# Patient Record
Sex: MALE | Race: WHITE | NOT HISPANIC OR LATINO | Employment: FULL TIME | URBAN - METROPOLITAN AREA
[De-identification: names, ages, dates, MRNs, and addresses within clinical notes are randomized per-mention and may not be internally consistent; named-entity substitution may affect disease eponyms.]

---

## 2017-05-19 ENCOUNTER — OFFICE VISIT (OUTPATIENT)
Dept: FAMILY MEDICINE CLINIC | Facility: CLINIC | Age: 22
End: 2017-05-19

## 2017-05-19 VITALS
HEIGHT: 74 IN | TEMPERATURE: 98 F | SYSTOLIC BLOOD PRESSURE: 140 MMHG | WEIGHT: 314.1 LBS | HEART RATE: 82 BPM | DIASTOLIC BLOOD PRESSURE: 90 MMHG | OXYGEN SATURATION: 99 % | BODY MASS INDEX: 40.31 KG/M2 | RESPIRATION RATE: 14 BRPM

## 2017-05-19 DIAGNOSIS — I10 ESSENTIAL HYPERTENSION: Primary | ICD-10-CM

## 2017-05-19 PROBLEM — Z00.00 ROUTINE ADULT HEALTH MAINTENANCE: Status: ACTIVE | Noted: 2017-05-19

## 2017-05-19 PROBLEM — E66.3 SEVERELY OVERWEIGHT: Status: ACTIVE | Noted: 2017-05-19

## 2017-05-19 PROCEDURE — 99204 OFFICE O/P NEW MOD 45 MIN: CPT | Performed by: FAMILY MEDICINE

## 2017-05-19 RX ORDER — LISINOPRIL AND HYDROCHLOROTHIAZIDE 12.5; 1 MG/1; MG/1
1 TABLET ORAL DAILY
Qty: 90 TABLET | Refills: 3 | Status: SHIPPED | OUTPATIENT
Start: 2017-05-19 | End: 2018-07-05 | Stop reason: SDUPTHER

## 2017-06-22 ENCOUNTER — OFFICE VISIT (OUTPATIENT)
Dept: FAMILY MEDICINE CLINIC | Facility: CLINIC | Age: 22
End: 2017-06-22

## 2017-06-22 VITALS
HEART RATE: 92 BPM | SYSTOLIC BLOOD PRESSURE: 136 MMHG | BODY MASS INDEX: 39.14 KG/M2 | HEIGHT: 74 IN | TEMPERATURE: 98.1 F | DIASTOLIC BLOOD PRESSURE: 90 MMHG | WEIGHT: 305 LBS | OXYGEN SATURATION: 98 %

## 2017-06-22 DIAGNOSIS — I10 ESSENTIAL HYPERTENSION: Primary | ICD-10-CM

## 2017-06-22 PROCEDURE — 99213 OFFICE O/P EST LOW 20 MIN: CPT | Performed by: FAMILY MEDICINE

## 2017-06-22 NOTE — PROGRESS NOTES
"  Chief Complaint   Patient presents with   • Follow-up   • Hypertension       Subjective     Patient here for follow-up of elevated blood pressure.    He is exercising and is adherent to a low-salt diet.    Blood pressure is well controlled at home.   Cardiac symptoms: none.   Patient denies: chest pressure/discomfort, claudication, cough, dyspnea, exertional chest pressure/discomfort, fatigue, irregular heart beat and lower extremity edema.   Cardiovascular risk factors: hypertension, male gender and obesity (BMI >= 30 kg/m2).   Use of agents associated with hypertension: none.   History of target organ damage: none.  Patient is taking prescribed hypertension medications as prescribed without side effects.  We added hctz to his lisinopril last ov      The following portions of the patient's history were reviewed and updated as appropriate: allergies, current medications, past family history, past medical history, past social history, past surgical history and problem list.    Review of Systems  Pertinent items are noted in HPI.    No results found for this or any previous visit.     Vitals:    06/22/17 0910 06/22/17 0921   BP: 126/84 136/90   BP Location: Left arm Right arm   Patient Position: Sitting Sitting   Cuff Size:  Large Adult   Pulse: 92    Temp: 98.1 °F (36.7 °C)    SpO2: 98%    Weight: (!) 305 lb (138 kg)    Height: 74\" (188 cm)      Objective    Gen: alert, pleasant.  HEENT: PERRL, EOMI intact, lids ok, ear canals clear, TMs normal, throat clear, nostrils normal  Neck: no bruit, no enlarged thyroid  Lungs: CTA  Heart: RR no murmur  ABD: soft , + BS  Pulses: intact  Mood: stable     Assessment/Plan   Hypertension, normal blood pressure Evidence of target organ damage: none.    Murray was seen today for follow-up and hypertension.    Diagnoses and all orders for this visit:    Essential hypertension      Medication: no change.  Dietary sodium restriction.  Regular aerobic exercise.  Follow up: 6 months " and as needed.    There are no Patient Instructions on file for this visit.  There are no discontinued medications.     No Follow-up on file.    Limit salt  Limit alcoholic drinks to 1 a day  Limit caffeine to 1-2 servings a day    Dr. Son Hwang MD  [place], [place]  [facility].

## 2017-09-22 ENCOUNTER — APPOINTMENT (OUTPATIENT)
Dept: GENERAL RADIOLOGY | Facility: HOSPITAL | Age: 22
End: 2017-09-22

## 2017-09-22 ENCOUNTER — HOSPITAL ENCOUNTER (EMERGENCY)
Facility: HOSPITAL | Age: 22
Discharge: HOME | End: 2017-09-22
Attending: EMERGENCY MEDICINE | Admitting: EMERGENCY MEDICINE

## 2017-09-22 VITALS
SYSTOLIC BLOOD PRESSURE: 153 MMHG | WEIGHT: 315 LBS | OXYGEN SATURATION: 95 % | DIASTOLIC BLOOD PRESSURE: 81 MMHG | TEMPERATURE: 98.3 F | RESPIRATION RATE: 18 BRPM | HEART RATE: 93 BPM | HEIGHT: 75 IN | BODY MASS INDEX: 39.17 KG/M2

## 2017-09-22 DIAGNOSIS — M75.101 TEAR OF RIGHT ROTATOR CUFF, UNSPECIFIED TEAR EXTENT: Primary | ICD-10-CM

## 2017-09-22 PROCEDURE — 73030 X-RAY EXAM OF SHOULDER: CPT

## 2017-09-22 PROCEDURE — 99283 EMERGENCY DEPT VISIT LOW MDM: CPT

## 2017-09-22 PROCEDURE — 99282 EMERGENCY DEPT VISIT SF MDM: CPT | Performed by: EMERGENCY MEDICINE

## 2017-09-22 RX ORDER — DICLOFENAC SODIUM 75 MG/1
75 TABLET, DELAYED RELEASE ORAL 2 TIMES DAILY PRN
Qty: 20 TABLET | Refills: 0 | Status: SHIPPED | OUTPATIENT
Start: 2017-09-22 | End: 2017-10-20 | Stop reason: ALTCHOICE

## 2017-09-22 RX ORDER — METAXALONE 800 MG/1
800 TABLET ORAL 4 TIMES DAILY PRN
Qty: 24 TABLET | Refills: 0 | Status: SHIPPED | OUTPATIENT
Start: 2017-09-22 | End: 2017-10-20

## 2017-09-23 NOTE — ED PROVIDER NOTES
Subjective     History provided by:  Patient    History of Present Illness    · Chief complaint: Right shoulder pain    · Location: Top of the right shoulder    · Quality/Severity: Moderately severe pain on the top of his right shoulder.    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    · Modifying Factors: Movement of the right shoulder exacerbates pain.    · Associated symptoms: He has numbness of the right fourth and fifth fingers.    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  Range of motion wrist right shoulder exacerbates pain.  The patient has a history of prior partial rotator cuff tear on the right which was treated with physical therapy.    ED Triage Vitals   Temp Heart Rate Resp BP SpO2   09/22/17 1645 09/22/17 1645 09/22/17 1645 09/22/17 1645 09/22/17 1645   98.3 °F (36.8 °C) 114 22 154/91 96 %      Temp src Heart Rate Source Patient Position BP Location FiO2 (%)   09/22/17 1645 09/22/17 1645 09/22/17 1912 09/22/17 1645 --   Oral Monitor Lying Left arm        Review of Systems   Constitutional: Negative for activity change, appetite change, chills, diaphoresis, fatigue and fever.   HENT: Negative for congestion, dental problem, ear pain, hearing loss, mouth sores, postnasal drip, rhinorrhea, sinus pressure, sore throat and voice change.    Eyes: Negative for photophobia, pain, discharge, redness and visual disturbance.   Respiratory: Negative for cough, chest tightness, shortness of breath, wheezing and stridor.    Cardiovascular: Negative for chest pain, palpitations and leg swelling.   Gastrointestinal: Negative for abdominal pain, diarrhea, nausea and vomiting.   Genitourinary: Negative for difficulty urinating,  dysuria, flank pain, frequency, hematuria and urgency.   Musculoskeletal: Negative for arthralgias, back pain, gait problem, joint swelling, myalgias, neck pain and neck stiffness.   Skin: Negative for color change and rash.   Neurological: Negative for dizziness, tremors, seizures, syncope, facial asymmetry, speech difficulty, weakness, light-headedness, numbness and headaches.   Hematological: Negative for adenopathy.   Psychiatric/Behavioral: Negative.  Negative for confusion and decreased concentration. The patient is not nervous/anxious.        Past Medical History:   Diagnosis Date   • Hypertension    • Shoulder injury     right       No Known Allergies    Past Surgical History:   Procedure Laterality Date   • TONSILLECTOMY     • TOOTH EXTRACTION  05/2017       Family History   Problem Relation Age of Onset   • Diabetes Mother    • COPD Mother      smoker   • Hypertension Father    • Lung cancer Father    • Alcohol abuse Brother        Social History     Social History   • Marital status: Single     Spouse name: N/A   • Number of children: 0   • Years of education: N/A     Occupational History   •  [employer]     Social History Main Topics   • Smoking status: Never Smoker   • Smokeless tobacco: Never Used   • Alcohol use 1.2 oz/week     2 Cans of beer per week      Comment: social   • Drug use: No   • Sexual activity: Not Currently     Partners: Female     Other Topics Concern   • None     Social History Narrative           Objective   Physical Exam   Constitutional: He is oriented to person, place, and time. He appears well-developed and well-nourished. No distress.   HENT:   Head: Normocephalic and atraumatic.   Musculoskeletal:   Right shoulder is no bony deformities.  Is mild soft tissue tenderness over the deltoid area of the right shoulder.  He has severe pain in the right shoulder, especially the deltoid area with range of motion of the right shoulder.  The right hand is neurovascularly intact.    Neurological: He is alert and oriented to person, place, and time. No cranial nerve deficit.   No focal motor sensory deficit   Skin: Skin is warm and dry. No rash noted. He is not diaphoretic. No erythema.   Psychiatric: He has a normal mood and affect. His behavior is normal. Judgment and thought content normal.   Nursing note and vitals reviewed.      Procedures         ED Course  ED Course   Comment By Time   Tez Report [id number] Benjamin Lee MD 09/22 1845   Right arm placed in shoulder immobilizer by tech.  Right hand neurovascularly intact after placement. Benjamin Lee MD 09/22 1900   The patient's right shoulder x-ray was normal.  Is my impression the patient has a partial tear of his rotator cuff.  He will be referred to follow [facility]. Benjamin Lee MD 09/22 9555                  MDM  Number of Diagnoses or Management Options  Tear of right rotator cuff, unspecified tear extent: new and requires workup     Amount and/or Complexity of Data Reviewed  Tests in the radiology section of CPT®: ordered and reviewed  Independent visualization of images, tracings, or specimens: yes    Risk of Complications, Morbidity, and/or Mortality  Presenting problems: low  Diagnostic procedures: low  Management options: low    Patient Progress  Patient progress: stable      Final diagnoses:   Tear of right rotator cuff, unspecified tear extent           Labs Reviewed - No data to display  XR Shoulder 2+ View Right   ED Interpretation   No fracture or dislocation.  No soft tissue abnormality.      Final Result             Medication List      New Prescriptions          diclofenac 75 MG EC tablet   Commonly known as:  VOLTAREN   Take 1 tablet by mouth 2 (Two) Times a Day As Needed (Pain) for up to 20   doses.       metaxalone 800 MG tablet   Commonly known as:  SKELAXIN   Take 1 tablet by mouth 4 (Four) Times a Day As Needed for Muscle Spasms   for up to 24 doses.                Benjamin Lee MD  10/04/17  [id number]

## 2017-09-27 ENCOUNTER — HOSPITAL ENCOUNTER (OUTPATIENT)
Dept: PHYSICAL THERAPY | Facility: HOSPITAL | Age: 22
Setting detail: THERAPIES SERIES
Discharge: HOME | End: 2017-09-27

## 2017-09-27 DIAGNOSIS — S46.911A SHOULDER STRAIN, RIGHT, INITIAL ENCOUNTER: Primary | ICD-10-CM

## 2017-09-27 NOTE — THERAPY EVALUATION
"    Outpatient Physical Therapy Ortho Initial Evaluation   [facility]     Patient Name: Murray Regalado  : 1995  MRN: [id number]  Today's Date: 2017      Visit Date: 2017    Patient Active Problem List   Diagnosis   • Essential hypertension   • Routine adult health maintenance   • Severely overweight   • Impingement syndrome of right shoulder        Past Medical History:   Diagnosis Date   • Hypertension    • Shoulder injury     right        Past Surgical History:   Procedure Laterality Date   • TONSILLECTOMY     • TOOTH EXTRACTION  2017       Visit Dx:     ICD-10-CM ICD-9-CM   1. 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 Pt had progressinve R shoulder pain since. Pt seen by Torin Laboy and given medication -presently off duty- and given referral to PT. Pt relates he has had a previous R shoulder injury involving R RCT 2 years ago. Pt attended PT x 2mos and had full recovery  -CC      Previous treatment for THIS PROBLEM Medication  -CC      Onset Date- PT 17  -CC      Patient/Caregiver Goals Relieve pain;Return to prior level of function;Return to work;Improve mobility;Improve strength  -CC      Hand Dominance right-handed  -CC      Occupation/sports/leisure activities EMS at [employer]- - remodeling his home  -CC      Patient seeing anyone else for problem(s)? Yes    Torin Laboy/[facility]aron K[facility] PA-C  -CC      How has patient tried to help " current problem? Use of CP - medication- Propping up with pillows  -CC      What clinical tests have you had for this problem? X-ray  -CC      Results of Clinical Tests Negative findings  -CC      History of Previous Related Injuries R RCT 2 years ago due to work related injury- had PT -RTW - full recovery  -CC      Pain     Pain Location Shoulder   Ant/mid and R UT up to neck  -CC      Pain at Present 7  -CC      Pain at Best 2  -CC      Pain Frequency Constant/continuous  -CC      What Performance Factors Make the Current Problem(s) WORSE? Any use of R UE- using L for most all of ADL  -CC      What Performance Factors Make the Current Problem(s) BETTER? Keeping R hand in pocket- cold packs-propping back into corner of couch when sitting  -CC      Is your sleep disturbed? Yes  -CC      What position do you sleep in? --   Propped up on many pillows -R UE supported  -CC      Difficulties at work? 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 Yes- eating -dressing- bathing- lifiting with LUE  -CC      Difficulties with recreational activities? Yes  -CC      Daily Activities    Primary Language English  -CC      How does patient learn best? Listening  -CC      Teaching needs identified Home Exercise Program;Management of Condition  -CC      Patient is concerned about/has problems with Difficulty with self care (i.e. bathing, dressing, toileting:;Grasping objects lifting;Performing home management (household chores, shopping, care of dependents);Performing job responsibilities/community activities (work, school,;Performing sports, recreation, and play activities;Reaching over head;Repetitive movements of the hand, arm, shoulder  -CC      Does patient have problems with the following? None  -CC      Barriers to learning None  -CC      Pt Participated in POC and Goals Yes  -CC      Safety    Are you being hurt, hit, or frightened by anyone at home or in your life? No  -CC      Are you being  neglected by a caregiver No  -CC        User Key  (r) = Recorded By, (t) = Taken By, (c) = Cosigned By    Initials Name Provider Type    CC Gayatri Peters, STEVENSON Physical Therapist                PT Ortho       09/27/17 0900    Precautions and Contraindications    Precautions/Limitations no known precautions/limitations   limited by pain  -CC    Posture/Observations    Forward Head Mild;Moderate  -CC    Cervical Lordosis Mild;Moderate;Increased  -CC    Thoracic Kyphosis Moderate;Increased  -CC    Rounded Shoulders Moderate;Increased  -CC    Scapular Elevation Right:;Mild;Decreased  -CC    Sensory Screen for Light Touch- Upper Quarter Clearing    C4 (posterior shoulder) Right:;Intact  -CC    C5 (lateral upper arm) Right:;Intact  -CC    C6 (tip of thumb) Right:;Intact  -CC    C7 (tip of 3rd finger) Right:;Intact  -CC    C8 (tip of 5th finger) Right:;Diminished   paresthesia  -CC    T1 (medial lower arm) Right:;Intact  -CC    Cervical/Shoulder ROM Screen    Cervical flexion Normal  -CC    Cervical extension Normal  -CC    Cervical lateral flexion Normal  -CC    Cervical rotation Normal  -CC    Cervical quadrant (Spurling's) Normal  -CC    Cervical/Thoracic Special Tests    Cervical Compression (Forarminal Compression vs. Facet Pain) Negative  -CC    Cervical Distraction (Foraminal Compression vs. Facet Pain) Negative  -CC    Shoulder Impingement/Rotator Cuff Special Tests    Litlte-Walker Test (RC Lesion vs. Bursitis) Right:;Positive  -CC    Neer Impingement Test (RC Lesion vs. Bursitis) Right:;Positive  -CC    Full Can Test (RC Lesion) Right:;Unable to test  -CC    Empty Can Test (RC Lesion) Right:;Unable to test  -CC    Drop Arm Test (Full Thickness RC Lesion) Right:;Unable to test  -CC    Speed's Test (LH of Biceps Lesion) Right:;Unable to test  -CC    Yergason Test (LH of Biceps Lesion) Right:;Unable to test  -CC    Biceps/Labral Special Tests    Clunk Test (Labral Test) Right:;Unable to test  -CC     [place]'s Test (Labral Test) Right:;Unable to test  -CC    Right Shoulder    Flexion AROM Deficit 40 degrees  -CC    Flexion PROM Deficit WNL  -CC    ABduction AROM Deficit 50 degrees  -CC    ABduction PROM Deficit NT due to pain  -CC    External Rotation AROM Deficit NT   -CC    External Rotation PROM Deficit NT  -CC    Internal Rotation AROM Deficit NT  -CC    Internal Rotation PROM Deficit NT  -CC    Right Elbow/Forearm    Extension/Flexion AROM Deficit WNL  -CC    Right Wrist    Flexion AROM Deficit WNL  -CC    Right Shoulder    Flexion Gross Movement (3-/5) fair minus  -CC    ABduction Gross Movement (3-/5) fair minus  -CC    Int Rotation Gross Movement (3+/5) fair plus  -CC    Ext Rotation Gross Movement (4-/5) good minus  -CC    Right Elbow/Forearm    Elbow Flexion Gross Movement (4-/5) good minus  -CC    Elbow Extension Gross Movement (4/5) good  -CC    Right Wrist    Wrist Manual Muscle Testing Detail --   dynamometer R 25#/ L 80#  -CC      User Key  (r) = Recorded By, (t) = Taken By, (c) = Cosigned By    Initials Name Provider Type    CC Gayatri Peters, PT Physical Therapist                            Therapy Education       09/27/17 1035          Therapy Education    Education Details Pt to perform  A/A  Rom 2-3x day as tolerated for HEP. Continue use of cold pack at least 3x/day/ PRN  -CC      Given HEP  -CC      Program New  -CC      How Provided Verbal;Demonstration  -CC      Provided to Patient  -CC      Level of Understanding Demonstrated  -CC        User Key  (r) = Recorded By, (t) = Taken By, (c) = Cosigned By    Initials Name Provider Type    MASON Peters PT Physical Therapist                PT OP Goals       09/27/17 1400 09/27/17 0900    PT Short Term Goals    STG Date to Achieve --  -CC 10/16/17  -CC    STG 1 --  -CC Pt able to tolerate and perfrom HEP as instructed  -CC    STG 2 --  -CC Pt able to show AROM R shoulder in FF/ABD at least 140-150 degrees  -CC    STG 3 --   -CC Pt able to tolerate and use RUE to eat- drink-dress with RUE  -CC    STG 4 --  -CC Pt reports disturbed sleep and using only 1-2 pillows to sleep in bed  -CC    STG 5 --  -CC Pt reports max pain less/equal 4-5/10  -CC    Long Term Goals    LTG Date to Achieve  11/17/17  -CC    LTG 1  Pt has full Arom without painful arc R  shoulder  -CC    LTG 2  Pt shows 4/5 to 4/5+ in R shoulder  -CC    LTG 3  Pt reports intermittent sx with max sx 2-/10  -CC    LTG 4  Pt able to RTW  -CC      User Key  (r) = Recorded By, (t) = Taken By, (c) = Cosigned By    Initials Name Provider Type    CC Gayatri Peters PT Physical Therapist                PT Assessment/Plan       09/27/17 3898       PT Assessment    Functional Limitations Limitation in home management;Limitations in community activities;Limitations in functional capacity and performance;Performance in leisure activities;Performance in self-care ADL;Performance in work activities  -CC     Impairments Muscle strength;Sensation;Pain;Range of motion;Posture  -CC     Assessment Comments Pt presents with R shoulder strain work injury s/p 5 days with ongoing acute pain sx. Pt c/o tenderness to palpation all of R shoulder joint and soft tissue and RUT. Arom very limited due to pain FF 40 degrees and ABD 50 degrees thus testing of R shoulder limiited as well due to acute pain sx.. Will need pain and inflammation to be reduced R shoulder joint to determine with testing if pt may have  RCT and or labral involvement due to injury giving CPR x 10 min. Pt had temporary reduction in sx with IFC/CP at end of session                                                                                                                                                                                                                           -CC     Please refer to paper survey for additional self-reported information Yes   Quick [id number]  -CC     Rehab Potential Good  -CC      Patient/caregiver participated in establishment of treatment plan and goals Yes  -CC     Patient would benefit from skilled therapy intervention Yes  -CC     PT Plan    PT Frequency 3x/week  -CC     Predicted Duration of Therapy Intervention (days/wks) 4- 8 weeks  -CC     Planned CPT's? PT EVAL LOW COMPLEXITY: 65689;PT HOT OR COLD PACK TREAT MCARE;PT ELECTRICAL STIM UNATTEND: ;PT MANUAL THERAPY EA 15 MIN: 52225;PT ULTRASOUND EA 15 MIN: 74764  -CC     Physical Therapy Interventions (Optional Details) patient/family education;joint mobilization;home exercise program;manual therapy techniques;ROM (Range of Motion);strengthening;modalities;stretching;taping  -CC     PT Plan Comments Continue modalities and progress ROM and strengthening exercise as pt tolerates to improve functional use of RUE   -CC       User Key  (r) = Recorded By, (t) = Taken By, (c) = Cosigned By    Initials Name Provider Type    CC Gayatri Peters, PT Physical Therapist                Modalities       09/27/17 0900          Ice    Ice Applied Yes  -CC      Location R shoulder/UT  -CC      Rx Minutes 15 mins  -CC      Ice S/P Rx Yes    with IFC  -CC      Ultrasound 16585    Location R ant/mid shoulder /UT  -CC      Rx Minutes 10 min  -CC      Frequency 1.0 MHz  -CC      Intensity - Wts/cm 1.5  -CC      ELECTRICAL STIMULATION    Attended/Unattended Unattended  -CC      Stimulation Type IFC  -CC      Location/Electrode Placement/Other R shoulder and UT- pt seated with pillow prop RUE and seated at wall with pillow for head  -CC      Rx Minutes 15 mins  -CC      Other Treatment Provided    Taping / Bracing Kinesiotaping to inhibit R UT  -CC        User Key  (r) = Recorded By, (t) = Taken By, (c) = Cosigned By    Initials Name Provider Type    CC Gayatri Peters, PT Physical Therapist              Exercises       09/27/17 0900          Exercise 1    Exercise Name 1 Pendulums  -CC      Cueing 1 Demo  -CC      Reps 1 10  -CC       Exercise 2    Exercise Name 2 Shoulder rolls and  Scap Squeezes  -CC      Cueing 2 Demo  -CC      Reps 2 5   5-10x - hold scap squeeze 3-5 sec as tolerated  -      Exercise 3    Exercise Name 3 FF finger climb  -      Cueing 3 Demo  -CC      Reps 3 5   use of LUE to support arm at wall  -        User Key  (r) = Recorded By, (t) = Taken By, (c) = Cosigned By    Initials Name Provider Type     Gayatri Peters, PT Physical Therapist                              Time Calculation:   Start Time: 0840  Stop Time: 0940  Time Calculation (min): 60 min     Therapy Charges for Today     Code Description Service Date Service Provider Modifiers Qty    88009096729 HC PT EVAL LOW COMPLEXITY 1 9/27/2017 Gayatri Peters, PT GP 1    16222631866 HC PT ULTRASOUND EA 15 MIN 9/27/2017 Gayatri Peters, PT GP 1    57512888825 HC PT THER PROC EA 15 MIN 9/27/2017 Gayatri Peters, PT GP 1    81344355674 HC PT ELECTRICAL STIM UNATTENDED 9/27/2017 Gayatri Peters, PT  1    80112376879 HC PT HOT OR COLD PACK TREAT MCARE 9/27/2017 Gayatri Peters, PT GP 1                    Gayatri Peters, PT  9/27/2017

## 2017-09-28 ENCOUNTER — HOSPITAL ENCOUNTER (OUTPATIENT)
Dept: PHYSICAL THERAPY | Facility: HOSPITAL | Age: 22
Setting detail: THERAPIES SERIES
Discharge: HOME | End: 2017-09-28

## 2017-09-28 PROCEDURE — 97110 THERAPEUTIC EXERCISES: CPT

## 2017-09-28 PROCEDURE — G0283 ELEC STIM OTHER THAN WOUND: HCPCS

## 2017-09-28 PROCEDURE — 97035 APP MDLTY 1+ULTRASOUND EA 15: CPT

## 2017-09-28 PROCEDURE — 97140 MANUAL THERAPY 1/> REGIONS: CPT

## 2017-09-28 NOTE — THERAPY TREATMENT NOTE
Outpatient Physical Therapy Ortho Treatment Note  WIL Castro     Patient Name: Murray Regalado  : 1995  MRN: [id number]  Today's Date: 2017      Visit Date: 2017    Visit Dx:  No diagnosis found.    Patient Active Problem List   Diagnosis   • Essential hypertension   • Routine adult health maintenance   • Severely overweight   • Impingement syndrome of right shoulder        Past Medical History:   Diagnosis Date   • Hypertension    • Shoulder injury     right        Past Surgical History:   Procedure Laterality Date   • TONSILLECTOMY     • TOOTH EXTRACTION  2017             PT Ortho       17 0900    Precautions and Contraindications    Precautions/Limitations no known precautions/limitations   limited by pain  -CC    Posture/Observations    Forward Head Mild;Moderate  -CC    Cervical Lordosis Mild;Moderate;Increased  -CC    Thoracic Kyphosis Moderate;Increased  -CC    Rounded Shoulders Moderate;Increased  -CC    Scapular Elevation Right:;Mild;Decreased  -CC    Sensory Screen for Light Touch- Upper Quarter Clearing    C4 (posterior shoulder) Right:;Intact  -CC    C5 (lateral upper arm) Right:;Intact  -CC    C6 (tip of thumb) Right:;Intact  -CC    C7 (tip of 3rd finger) Right:;Intact  -CC    C8 (tip of 5th finger) Right:;Diminished   paresthesia  -CC    T1 (medial lower arm) Right:;Intact  -CC    Cervical/Shoulder ROM Screen    Cervical flexion Normal  -CC    Cervical extension Normal  -CC    Cervical lateral flexion Normal  -CC    Cervical rotation Normal  -CC    Cervical quadrant (Spurling's) Normal  -CC    Cervical/Thoracic Special Tests    Cervical Compression (Forarminal Compression vs. Facet Pain) Negative  -CC    Cervical Distraction (Foraminal Compression vs. Facet Pain) Negative  -CC    Shoulder Impingement/Rotator Cuff Special Tests    Little-Walker Test (RC Lesion vs. Bursitis) Right:;Positive  -CC    Neer Impingement Test (RC Lesion vs. Bursitis) Right:;Positive  -CC     Full Can Test (RC Lesion) Right:;Unable to test  -CC    Empty Can Test (RC Lesion) Right:;Unable to test  -CC    Drop Arm Test (Full Thickness RC Lesion) Right:;Unable to test  -CC    Speed's Test (LH of Biceps Lesion) Right:;Unable to test  -CC    Yergason Test (LH of Biceps Lesion) Right:;Unable to test  -CC    Biceps/Labral Special Tests    Clunk Test (Labral Test) Right:;Unable to test  -CC    [place]and[place]'s Test (Labral Test) Right:;Unable to test  -CC    Right Shoulder    Flexion AROM Deficit 40 degrees  -CC    Flexion PROM Deficit WNL  -CC    ABduction AROM Deficit 50 degrees  -CC    ABduction PROM Deficit NT due to pain  -CC    External Rotation AROM Deficit NT   -CC    External Rotation PROM Deficit NT  -CC    Internal Rotation AROM Deficit NT  -CC    Internal Rotation PROM Deficit NT  -CC    Right Elbow/Forearm    Extension/Flexion AROM Deficit WNL  -CC    Right Wrist    Flexion AROM Deficit WNL  -CC    Right Shoulder    Flexion Gross Movement (3-/5) fair minus  -CC    ABduction Gross Movement (3-/5) fair minus  -CC    Int Rotation Gross Movement (3+/5) fair plus  -CC    Ext Rotation Gross Movement (4-/5) good minus  -CC    Right Elbow/Forearm    Elbow Flexion Gross Movement (4-/5) good minus  -CC    Elbow Extension Gross Movement (4/5) good  -CC    Right Wrist    Wrist Manual Muscle Testing Detail --   dynamometer R 25#/ L 80#  -CC      User Key  (r) = Recorded By, (t) = Taken By, (c) = Cosigned By    Initials Name Provider Type     Gayatri Peters, PT Physical Therapist                            PT Assessment/Plan       09/28/17 1619 09/27/17 1655    PT Assessment    Functional Limitations  Limitation in home management;Limitations in community activities;Limitations in functional capacity and performance;Performance in leisure activities;Performance in self-care ADL;Performance in work activities  -    Impairments  Muscle strength;Sensation;Pain;Range of motion;Posture  -    Assessment  Comments Pt ambulating with less guarding of R UE noted today. Palpated decrease muscle spasm RUT and pt rated less sx 3/10 vs 6/10 at initial session.  Pt tolerated Prom/A/Arom better today as well.  Attempted bilateral rows but had increase sx in posterior shoulder joint. Pt rated sx one level less than at onset 5/10   -CC Pt presents with R shoulder strain work injury s/p 5 days with ongoing acute pain sx. Pt c/o tenderness to palpation all of R shoulder joint and spft tissue and RUT. Arom very limited due to pain FF 40 degrees and ABD 50 degrees thus testing of R shoulder limiited as well due to acute pain sx.. Will need pain and inflammation to be reduced R shoulder joint to determine with testin if pt may have  RCT and or labral involvement due to injury giving CPR x 10 min. Pt had temporary reduction in sx with IFC/CP at end of session                                                                                                                                                                                                                           -CC    Please refer to paper survey for additional self-reported information  Yes   Quick [id number]  -CC    Rehab Potential  Good  -CC    Patient/caregiver participated in establishment of treatment plan and goals  Yes  -CC    Patient would benefit from skilled therapy intervention  Yes  -CC    PT Plan    PT Frequency  3x/week  -CC    Predicted Duration of Therapy Intervention (days/wks)  4- 8 weeks  -CC    Planned CPT's?  PT EVAL LOW COMPLEXITY: 81279;PT HOT OR COLD PACK TREAT MCARE;PT ELECTRICAL STIM UNATTEND: ;PT MANUAL THERAPY EA 15 MIN: 60063;PT ULTRASOUND EA 15 MIN: 27195  -CC    Physical Therapy Interventions (Optional Details)  patient/family education;joint mobilization;home exercise program;manual therapy techniques;ROM (Range of Motion);strengthening;modalities;stretching;taping  -CC    PT Plan Comments Continue per plan.  -CC Continue modalities and  progress ROM and strengthening exercise as pt tolerates to improve functional use of RUE   -CC      User Key  (r) = Recorded By, (t) = Taken By, (c) = Cosigned By    Initials Name Provider Type    MASON Peters PT Physical Therapist                Modalities       09/28/17 1500          Subjective Comments    Subjective Comments Pt relates that the tape seemed to help the R UT area relax. Pt also stated that he was able to sleep at least 4 hours last nite.  -CC      Subjective Pain    Able to rate subjective pain? yes  -CC      Pre-Treatment Pain Level 6   R UT 3/10  -CC      Ice    Location R shoulder/UT  -CC      Rx Minutes 15 mins  -CC      Ice S/P Rx Yes    with IFC  -CC      Ultrasound 83177    Location R ant/mid shoulder /UT  -CC      Rx Minutes 10 min  -CC      Frequency 1.0 MHz  -CC      Intensity - Wts/cm 1.5  -CC      ELECTRICAL STIMULATION    Attended/Unattended Unattended  -CC      Stimulation Type IFC  -CC      Location/Electrode Placement/Other R shoulder and UT- pt seated with pillow prop RUE and seated at wall with pillow for head  -CC      Rx Minutes 15 mins  -CC      Other Treatment Provided    Taping / Bracing Hold today due to slight redness with tape removal which may be due to catching hairs on tape-will apply next session  -CC        User Key  (r) = Recorded By, (t) = Taken By, (c) = Cosigned By    Initials Name Provider Type    MASON Peters PT Physical Therapist                Exercises       09/28/17 1500          Subjective Comments    Subjective Comments Pt relates that the tape seemed to help the R UT area relax. Pt also stated that he was able to sleep at least 4 hours last nite.  -CC      Subjective Pain    Able to rate subjective pain? yes  -CC      Pre-Treatment Pain Level 6   R UT 3/10  -CC      Exercise 1    Exercise Name 1 Pendulums  -CC      Cueing 1 Demo  -CC      Reps 1 10  -CC      Exercise 2    Exercise Name 2 Shoulder rolls and  Scap Squeezes  -CC       Cueing 2 Demo  -CC      Reps 2 5   5-10x - hold scap squeeze 3-5 sec as tolerated  -CC      Exercise 3    Exercise Name 3 FF finger climb  -CC      Cueing 3 Demo  -CC      Reps 3 10   use of LUE to support arm at wall  -CC      Exercise 4    Exercise Name 4 Cane FF supine  -CC      Cueing 4 Verbal  -CC      Reps 4 10  -CC      Exercise 5    Exercise Name 5 Cane scaption  -CC      Cueing 5 Verbal  -CC      Reps 5 10  -CC      Exercise 6    Exercise Name 6 Bilateral ER vs TB  -CC      Cueing 6 Demo  -CC      Reps 6 10   yellow  -CC      Exercise 7    Exercise Name 7 Pulley FF/scaption  -CC      Cueing 7 Verbal  -CC      Reps 7 10  -CC        User Key  (r) = Recorded By, (t) = Taken By, (c) = Cosigned By    Initials Name Provider Type    MASON Peters PT Physical Therapist                        Manual Rx (last 36 hours)      Manual Treatments       09/28/17 1500          Manual Rx 1    Manual Rx 1 Location R shoulder  -CC      Manual Rx 1 Type Prom   -CC      Manual Rx 1 Duration x10 each  -CC        User Key  (r) = Recorded By, (t) = Taken By, (c) = Cosigned By    Initials Name Provider Type    MASON Peters PT Physical Therapist                    Therapy Education       09/28/17 1619          Therapy Education    Education Details Add cane exercises to HEP  -CC      Given HEP  -CC        User Key  (r) = Recorded By, (t) = Taken By, (c) = Cosigned By    Initials Name Provider Type    MASON Peters PT Physical Therapist                Time Calculation:   Start Time: 1500  Stop Time: 1610  Time Calculation (min): 70 min    Therapy Charges for Today     Code Description Service Date Service Provider Modifiers Qty    50538677548 HC PT ELECTRICAL STIM UNATTENDED 9/28/2017 Gayatri Peters, PT  1    97878297164 HC PT HOT OR COLD PACK TREAT MCARE 9/28/2017 Gayatri Peters, PT GP 1    55823139985 HC PT THER PROC EA 15 MIN 9/28/2017 Gayatri Peters, PT GP  1    03989248541 HC PT ULTRASOUND EA 15 MIN 9/28/2017 Gayatri Peters, PT GP 1    14176968057 HC PT MANUAL THERAPY EA 15 MIN 9/28/2017 Gayatri Peters, PT GP 1                    Gayatri Peters, PT  9/28/2017

## 2017-09-29 ENCOUNTER — HOSPITAL ENCOUNTER (OUTPATIENT)
Dept: PHYSICAL THERAPY | Facility: HOSPITAL | Age: 22
Setting detail: THERAPIES SERIES
Discharge: HOME | End: 2017-09-29

## 2017-09-29 PROCEDURE — 97035 APP MDLTY 1+ULTRASOUND EA 15: CPT

## 2017-09-29 PROCEDURE — G0283 ELEC STIM OTHER THAN WOUND: HCPCS

## 2017-09-29 PROCEDURE — 97110 THERAPEUTIC EXERCISES: CPT

## 2017-09-29 PROCEDURE — 97140 MANUAL THERAPY 1/> REGIONS: CPT

## 2017-09-29 NOTE — THERAPY TREATMENT NOTE
Outpatient Physical Therapy Ortho Treatment Note  WIL Castro     Patient Name: Murray Regalado  : 1995  MRN: [id number]  Today's Date: 2017      Visit Date: 2017    Visit Dx:  No diagnosis found.    Patient Active Problem List   Diagnosis   • Essential hypertension   • Routine adult health maintenance   • Severely overweight   • Impingement syndrome of right shoulder        Past Medical History:   Diagnosis Date   • Hypertension    • Shoulder injury     right        Past Surgical History:   Procedure Laterality Date   • TONSILLECTOMY     • TOOTH EXTRACTION  2017             PT Ortho       17 1100    Right Shoulder    Flexion AROM Deficit 110 degrees  -CC    ABduction AROM Deficit 105 degrees  -CC      17 0900    Precautions and Contraindications    Precautions/Limitations no known precautions/limitations   limited by pain  -CC    Posture/Observations    Forward Head Mild;Moderate  -CC    Cervical Lordosis Mild;Moderate;Increased  -CC    Thoracic Kyphosis Moderate;Increased  -CC    Rounded Shoulders Moderate;Increased  -CC    Scapular Elevation Right:;Mild;Decreased  -CC    Sensory Screen for Light Touch- Upper Quarter Clearing    C4 (posterior shoulder) Right:;Intact  -CC    C5 (lateral upper arm) Right:;Intact  -CC    C6 (tip of thumb) Right:;Intact  -CC    C7 (tip of 3rd finger) Right:;Intact  -CC    C8 (tip of 5th finger) Right:;Diminished   paresthesia  -CC    T1 (medial lower arm) Right:;Intact  -CC    Cervical/Shoulder ROM Screen    Cervical flexion Normal  -CC    Cervical extension Normal  -CC    Cervical lateral flexion Normal  -CC    Cervical rotation Normal  -CC    Cervical quadrant (Spurling's) Normal  -CC    Cervical/Thoracic Special Tests    Cervical Compression (Forarminal Compression vs. Facet Pain) Negative  -CC    Cervical Distraction (Foraminal Compression vs. Facet Pain) Negative  -CC    Shoulder Impingement/Rotator Cuff Special Tests    Little-Walker Test  (RC Lesion vs. Bursitis) Right:;Positive  -CC    Neer Impingement Test (RC Lesion vs. Bursitis) Right:;Positive  -CC    Full Can Test (RC Lesion) Right:;Unable to test  -CC    Empty Can Test (RC Lesion) Right:;Unable to test  -CC    Drop Arm Test (Full Thickness RC Lesion) Right:;Unable to test  -CC    Speed's Test (LH of Biceps Lesion) Right:;Unable to test  -CC    Yergason Test (LH of Biceps Lesion) Right:;Unable to test  -CC    Biceps/Labral Special Tests    Clunk Test (Labral Test) Right:;Unable to test  -CC    Nuckolls's Test (Labral Test) Right:;Unable to test  -CC    Right Shoulder    Flexion AROM Deficit 40 degrees  -CC    Flexion PROM Deficit WNL  -CC    ABduction AROM Deficit 50 degrees  -CC    ABduction PROM Deficit NT due to pain  -CC    External Rotation AROM Deficit NT   -CC    External Rotation PROM Deficit NT  -CC    Internal Rotation AROM Deficit NT  -CC    Internal Rotation PROM Deficit NT  -CC    Right Elbow/Forearm    Extension/Flexion AROM Deficit WNL  -CC    Right Wrist    Flexion AROM Deficit WNL  -CC    Right Shoulder    Flexion Gross Movement (3-/5) fair minus  -CC    ABduction Gross Movement (3-/5) fair minus  -CC    Int Rotation Gross Movement (3+/5) fair plus  -CC    Ext Rotation Gross Movement (4-/5) good minus  -CC    Right Elbow/Forearm    Elbow Flexion Gross Movement (4-/5) good minus  -CC    Elbow Extension Gross Movement (4/5) good  -CC    Right Wrist    Wrist Manual Muscle Testing Detail --   dynamometer R 25#/ L 80#  -CC      User Key  (r) = Recorded By, (t) = Taken By, (c) = Cosigned By    Initials Name Provider Type    MASON Peters PT Physical Therapist                            PT Assessment/Plan       09/29/17 1420 09/28/17 1619    PT Assessment    Assessment Comments Pt having decrease intensity of sx R shoulder. Pt has made some progress with AROM  degrees/ Scaption 105 degrees but still restricted use RUE in ADL. Pt also less soft tissue dysfunction R  shoulder from onset of RX -sx more localized now ant- mid shoulder joint on palpation. Needed to hold TB exericses today due to increase sx but gave isometrics to do for R shoulder muscle recruitment. Observed pt performing pendulum circles and noted increase laxity of anterior shoulder joint with pt c/o pain which may suggest some labral dysfunction.                                                                                                                                                                                 -CC Pt ambulating with less guarding of R UE noted today. Palpated decrease muscle spasm RUT and pt rated less sx 3/10 vs 6/10 at initial. Pt tolerate Prom/A/Arom better today as well.  Attempted bilateral rows but had increase sx in posterior shoulder joint. Pt rated sx one level less than at onset 5/10   -CC    PT Plan    PT Plan Comments Continue per POC and monitor response with advancing TE R shoulder joint  -CC Continue per plan.  -CC      User Key  (r) = Recorded By, (t) = Taken By, (c) = Cosigned By    Initials Name Provider Type    CC Gayatri Peters, PT Physical Therapist                Modalities       09/29/17 1100 09/28/17 1500       Subjective Comments    Subjective Comments Pt relates sx have decreased  another level on pain scale  -CC Pt relates that the tape seemed to help the R UT area relax. Pt also stated that he was able to sleep at least 4 hours last nite.  -CC     Subjective Pain    Able to rate subjective pain? yes  -CC yes  -CC     Pre-Treatment Pain Level 5  -CC 6   R UT 3/10  -CC     Post-Treatment Pain Level 4  -CC      Ice    Location R shoulder/UT  -CC R shoulder/UT  -CC     Rx Minutes 15 mins  -CC 15 mins  -CC     Ice S/P Rx Yes    with IFC  -CC Yes    with IFC  -CC     Ultrasound [id number]    Location R ant/mid shoulder /UT  -CC R ant/mid shoulder /UT  -CC     Rx Minutes 10 min  -CC 10 min  -CC     Frequency 1.0 MHz  -CC 1.0 MHz  -CC     Intensity - Wts/cm 1.5   -CC 1.5  -CC     ELECTRICAL STIMULATION    Attended/Unattended Unattended  -CC Unattended  -CC     Stimulation Type IFC  -CC IFC  -CC     Location/Electrode Placement/Other R shoulder and UT- pt seated with pillow prop RUE and seated at wall with pillow for head  -CC R shoulder and UT- pt seated with pillow prop RUE and seated at wall with pillow for head  -CC     Rx Minutes 15 mins  -CC 15 mins  -CC     Other Treatment Provided    Taping / Bracing Hold since pt's skin still showed redness in the pattern of [place] indicating excessive irritation to skin    -CC Hold today due to slight redness with tape removal which may be due to catching hairs on tape-will apply next session  -CC       User Key  (r) = Recorded By, (t) = Taken By, (c) = Cosigned By    Initials Name Provider Type    MASON Peters, PT Physical Therapist                Exercises       09/29/17 1100 09/28/17 1500       Subjective Comments    Subjective Comments Pt relates sx have decreased  another level on pain scale  -CC Pt relates that the tape seemed to help the R UT area relax. Pt also stated that he was able to sleep at least 4 hours last nite.  -CC     Subjective Pain    Able to rate subjective pain? yes  -CC yes  -CC     Pre-Treatment Pain Level 5  -CC 6   R UT 3/10  -CC     Post-Treatment Pain Level 4  -CC      Exercise 1    Exercise Name 1 Pendulums  -CC Pendulums  -CC     Cueing 1 Demo  -CC Demo  -CC     Reps 1 10  -CC 10  -CC     Exercise 2    Exercise Name 2 Shoulder rolls and  Scap Squeezes  -CC Shoulder rolls and  Scap Squeezes  -CC     Cueing 2 Demo  -CC Demo  -CC     Reps 2 5   5-10x - hold scap squeeze 3-5 sec as tolerated  -CC 5   5-10x - hold scap squeeze 3-5 sec as tolerated  -CC     Exercise 3    Exercise Name 3 FF finger climb  -CC FF finger climb  -CC     Cueing 3 Demo  -CC Demo  -CC     Reps 3 10   use of LUE to support arm at wall  -CC 10   use of LUE to support arm at wall  -CC     Exercise 4    Exercise Name 4  Cane FF supine  -CC Cane FF supine  -CC     Cueing 4 Verbal  -CC Verbal  -CC     Reps 4 10  -CC 10  -CC     Exercise 5    Exercise Name 5 Cane scaption  -CC Cane scaption  -CC     Cueing 5 Verbal  -CC Verbal  -CC     Reps 5 10  -CC 10  -CC     Exercise 6    Exercise Name 6 Bilateral ER vs TB  -CC Bilateral ER vs TB  -CC     Cueing 6 Demo  -CC Demo  -CC     Reps 6 10   Hold due to decrease tolerance  -CC 10   yellow  -CC     Exercise 7    Exercise Name 7 Pulley FF/scaption  -CC Pulley FF/scaption  -CC     Cueing 7 Verbal  -CC Verbal  -CC     Reps 7 10  -CC 10  -CC     Exercise 8    Exercise Name 8 4 way isometrics   -CC      Cueing 8 Demo  -CC      Reps 8 5   As tolerated without increase sx  -CC      Time (Seconds) 8 5 sec   -CC      Exercise 9    Exercise Name 9 Sidelying FF/ABD   -CC      Cueing 9 Tactile  -CC      Reps 9 10  -CC        User Key  (r) = Recorded By, (t) = Taken By, (c) = Cosigned By    Initials Name Provider Type    MASON Peters, PT Physical Therapist                        Manual Rx (last 36 hours)      Manual Treatments       09/29/17 1100 09/28/17 1500       Manual Rx 1    Manual Rx 1 Location R shoulder  -CC R shoulder  -CC     Manual Rx 1 Type Prom   -CC Prom   -CC     Manual Rx 1 Duration x10 each  -CC x10 each  -CC       User Key  (r) = Recorded By, (t) = Taken By, (c) = Cosigned By    Initials Name Provider Type    MASON Peters, STEVENSON Physical Therapist                    Therapy Education       09/29/17 1153 09/28/17 1619       Therapy Education    Education Details Do all exercises as tolerated -do not force - hold pendulum circles if flares sx  -CC Add cane exercises to HEP  -CC     Given HEP  -CC HEP  -CC       User Key  (r) = Recorded By, (t) = Taken By, (c) = Cosigned By    Initials Name Provider Type    MASON Peters, PT Physical Therapist                Time Calculation:   Start Time: 1040  Stop Time: 1200  Time Calculation (min): 80  min    Therapy Charges for Today     Code Description Service Date Service Provider Modifiers Qty    53378945152 HC PT MANUAL THERAPY EA 15 MIN 9/29/2017 Gayatri Peters, PT GP 1    10742894663 HC PT ULTRASOUND EA 15 MIN 9/29/2017 Gayatri Peters, PT GP 1    87699274753 HC PT THER PROC EA 15 MIN 9/29/2017 Gayatri Peters, PT GP 1    [id number] HC PT HOT OR COLD PACK TREAT MCARE 9/29/2017 Gayatri Peters, PT GP 1    [id number] HC PT ELECTRICAL STIM UNATTENDED 9/29/2017 Gayatri Peters, PT  1                    Gayatri Peters, PT  9/29/2017

## 2017-10-02 ENCOUNTER — HOSPITAL ENCOUNTER (OUTPATIENT)
Dept: PHYSICAL THERAPY | Facility: HOSPITAL | Age: 22
Setting detail: THERAPIES SERIES
Discharge: HOME | End: 2017-10-02

## 2017-10-02 PROCEDURE — 97140 MANUAL THERAPY 1/> REGIONS: CPT

## 2017-10-02 PROCEDURE — G0283 ELEC STIM OTHER THAN WOUND: HCPCS

## 2017-10-02 PROCEDURE — 97035 APP MDLTY 1+ULTRASOUND EA 15: CPT

## 2017-10-02 PROCEDURE — 97110 THERAPEUTIC EXERCISES: CPT

## 2017-10-02 NOTE — THERAPY TREATMENT NOTE
Outpatient Physical Therapy Ortho Treatment Note  WIL Castro     Patient Name: Murray Regalado  : 1995  MRN: [id number]  Today's Date: 10/2/2017      Visit Date: 10/02/2017    Visit Dx:  No diagnosis found.    Patient Active Problem List   Diagnosis   • Essential hypertension   • Routine adult health maintenance   • Severely overweight   • Impingement syndrome of right shoulder        Past Medical History:   Diagnosis Date   • Hypertension    • Shoulder injury     right        Past Surgical History:   Procedure Laterality Date   • TONSILLECTOMY     • TOOTH EXTRACTION  2017             PT Ortho       10/02/17 0930    Shoulder Impingement/Rotator Cuff Special Tests    Little-Walker Test (RC Lesion vs. Bursitis) Right:;Positive  -CC    Neer Impingement Test (RC Lesion vs. Bursitis) Right:;Positive  -CC    Drop Arm Test (Full Thickness RC Lesion) Right:;Negative  -CC    Biceps/Labral Special Tests    Breeding's Test (Labral Test) Right:;Positive  -CC    Right Shoulder    Flexion AROM Deficit 140 degrees -pain at 90'  -CC    ABduction AROM Deficit 115 degrees-pain sx onset at 85'  -CC      User Key  (r) = Recorded By, (t) = Taken By, (c) = Cosigned By    Initials Name Provider Type    CC Gayatri Peters, PT Physical Therapist                            PT Assessment/Plan       10/02/17 1406       PT Assessment    Assessment Comments Pt with resolving paresthesia in R hand but pain sx range the same in shoulder at 5/10 to 2/10 at rest. Progress with Arom 140 degrees and  degrees but with painful sx onset 85- 90 degrees. Pt testing postive for impingement and OBriens testing for labral defect. Negative Drop arm test today.   -CC     PT Plan    PT Plan Comments Continue PT- Pt had f/u with Torin Laboy following this session and progress note sent with patient relating anterior instabilty noted in R shoulder.  -CC       User Key  (r) = Recorded By, (t) = Taken By, (c) = Cosigned By     Initials Name Provider Type    CC Gayatri Peters, PT Physical Therapist                Modalities       10/02/17 0930          Ice    Location R shoulder/UT  -CC      Rx Minutes 15 mins  -CC      Ice S/P Rx Yes    with IF  -CC      Ultrasound [id number]    Location R ant/mid shoulder /UT  -CC      Rx Minutes 10 min  -CC      Frequency 1.0 MHz  -CC      Intensity - Wts/cm 1.5  -CC      ELECTRICAL STIMULATION    Attended/Unattended Unattended  -CC      Stimulation Type IFC  -CC      Location/Electrode Placement/Other R shoulder and UT- pt seated with pillow prop RUE and seated at wall with pillow for head  -CC      Rx Minutes 15 mins  -CC      Other Treatment Provided    Taping / Bracing Hold since pt's skin still showed redness in the pattern of Ktape indicating excessive irritation to skin    -CC        User Key  (r) = Recorded By, (t) = Taken By, (c) = Cosigned By    Initials Name Provider Type    MASON Peters, PT Physical Therapist                Exercises       10/02/17 0930          Subjective Comments    Subjective Comments Pt hope his R shoulder would feel better over the weekend but about the same sx but did relate the tingling into fingers is almost gone  -CC      Subjective Pain    Able to rate subjective pain? yes  -CC      Pre-Treatment Pain Level 5  -CC      Post-Treatment Pain Level 4  -CC      Exercise 1    Exercise Name 1 Pendulums  -CC      Cueing 1 Demo  -CC      Reps 1 10  -CC      Exercise 2    Exercise Name 2 Shoulder rolls and  Scap Squeezes  -CC      Cueing 2 Demo  -CC      Reps 2 5   5-10x - hold scap squeeze 3-5 sec as tolerated  -CC      Exercise 3    Exercise Name 3 FF finger climb  -CC      Cueing 3 Demo  -CC      Reps 3 10   use of LUE to support arm at wall  -CC      Exercise 4    Exercise Name 4 Cane FF supine  -CC      Cueing 4 Verbal  -CC      Reps 4 10  -CC      Exercise 5    Exercise Name 5 Cane scaption  -CC      Cueing 5 Verbal  -CC      Reps 5 10  -CC       Exercise 6    Exercise Name 6 Bilateral ER vs TB  -CC      Cueing 6 Demo  -CC      Reps 6 10   Hold due to decrease tolerance  -CC      Exercise 7    Exercise Name 7 Pulley FF/scaption  -CC      Cueing 7 Verbal  -CC      Reps 7 10  -CC      Exercise 8    Exercise Name 8 4 way isometrics   -CC      Cueing 8 Demo  -CC      Reps 8 10   As tolerated without increase sx  -CC      Time (Seconds) 8 5 sec   -CC      Exercise 9    Exercise Name 9 Sidelying FF/ABD   -CC      Cueing 9 Tactile  -CC      Reps 9 10  -CC        User Key  (r) = Recorded By, (t) = Taken By, (c) = Cosigned By    Initials Name Provider Type    MASON Peters PT Physical Therapist                        Manual Rx (last 36 hours)      Manual Treatments       10/02/17 0930          Manual Rx 1    Manual Rx 1 Location R shoulder  -CC      Manual Rx 1 Type Prom   -CC      Manual Rx 1 Duration x10 each  -CC        User Key  (r) = Recorded By, (t) = Taken By, (c) = Cosigned By    Initials Name Provider Type    MASON Peters PT Physical Therapist                    Therapy Education       10/02/17 1105          Therapy Education    Education Details Continue per previous instructions to do as tolerated and do not force  -CC      Given HEP  -CC        User Key  (r) = Recorded By, (t) = Taken By, (c) = Cosigned By    Initials Name Provider Type    MASON Peters PT Physical Therapist                Time Calculation:   Start Time: 0930  Stop Time: 1040  Time Calculation (min): 70 min    Therapy Charges for Today     Code Description Service Date Service Provider Modifiers Qty    07589457057 HC PT MANUAL THERAPY EA 15 MIN 10/2/2017 Gayatri Peters PT GP 1    16542604870 HC PT ULTRASOUND EA 15 MIN 10/2/2017 Gayatri Peters PT GP 1    11643999949 HC PT THER PROC EA 15 MIN 10/2/2017 Gayatri Peters, PT GP 1    25298069666 HC PT HOT OR COLD PACK TREAT MCARE 10/2/2017 Gayatri Peters, PT GP 1     [id number]  PT ELECTRICAL STIM UNATTENDED 10/2/2017 Gayatri Peters, PT  1                    Gayatri Peters, PT  10/2/2017

## 2017-10-03 ENCOUNTER — APPOINTMENT (OUTPATIENT)
Dept: PHYSICAL THERAPY | Facility: HOSPITAL | Age: 22
End: 2017-10-03

## 2017-10-03 ENCOUNTER — TRANSCRIBE ORDERS (OUTPATIENT)
Dept: ADMINISTRATIVE | Facility: HOSPITAL | Age: 22
End: 2017-10-03

## 2017-10-03 DIAGNOSIS — S46.011A STRAIN OF TENDON OF RIGHT ROTATOR CUFF, INITIAL ENCOUNTER: Primary | ICD-10-CM

## 2017-10-05 ENCOUNTER — APPOINTMENT (OUTPATIENT)
Dept: PHYSICAL THERAPY | Facility: HOSPITAL | Age: 22
End: 2017-10-05

## 2017-10-06 ENCOUNTER — HOSPITAL ENCOUNTER (OUTPATIENT)
Dept: GENERAL RADIOLOGY | Facility: HOSPITAL | Age: 22
Discharge: HOME | End: 2017-10-06
Admitting: PHYSICIAN ASSISTANT

## 2017-10-06 ENCOUNTER — HOSPITAL ENCOUNTER (OUTPATIENT)
Dept: MRI IMAGING | Facility: HOSPITAL | Age: 22
Discharge: HOME | End: 2017-10-06

## 2017-10-06 DIAGNOSIS — S46.011A STRAIN OF TENDON OF RIGHT ROTATOR CUFF, INITIAL ENCOUNTER: ICD-10-CM

## 2017-10-06 PROCEDURE — 73222 MRI JOINT UPR EXTREM W/DYE: CPT

## 2017-10-06 PROCEDURE — 0 IOPAMIDOL 61 % SOLUTION: Performed by: PHYSICIAN ASSISTANT

## 2017-10-06 PROCEDURE — 0 GADOBENATE DIMEGLUMINE 529 MG/ML SOLUTION: Performed by: PHYSICIAN ASSISTANT

## 2017-10-06 PROCEDURE — A9577 INJ MULTIHANCE: HCPCS | Performed by: PHYSICIAN ASSISTANT

## 2017-10-06 PROCEDURE — 73040 CONTRAST X-RAY OF SHOULDER: CPT

## 2017-10-06 RX ORDER — LIDOCAINE HYDROCHLORIDE 10 MG/ML
5 INJECTION, SOLUTION INFILTRATION; PERINEURAL
Status: COMPLETED | OUTPATIENT
Start: 2017-10-06 | End: 2017-10-06

## 2017-10-06 RX ADMIN — LIDOCAINE HYDROCHLORIDE 5 ML: 10 INJECTION, SOLUTION INFILTRATION; PERINEURAL at 09:30

## 2017-10-06 RX ADMIN — IOPAMIDOL 8 ML: 612 INJECTION, SOLUTION INTRAVENOUS at 09:35

## 2017-10-06 RX ADMIN — GADOBENATE DIMEGLUMINE 1 ML: 529 INJECTION, SOLUTION INTRAVENOUS at 09:15

## 2017-10-20 ENCOUNTER — OFFICE VISIT (OUTPATIENT)
Dept: ORTHOPEDIC SURGERY | Facility: CLINIC | Age: 22
End: 2017-10-20

## 2017-10-20 VITALS
BODY MASS INDEX: 36.06 KG/M2 | DIASTOLIC BLOOD PRESSURE: 80 MMHG | SYSTOLIC BLOOD PRESSURE: 136 MMHG | WEIGHT: 290 LBS | HEART RATE: 57 BPM | HEIGHT: 75 IN

## 2017-10-20 DIAGNOSIS — S43.431A TEAR OF RIGHT GLENOID LABRUM, INITIAL ENCOUNTER: Primary | ICD-10-CM

## 2017-10-20 PROCEDURE — 99204 OFFICE O/P NEW MOD 45 MIN: CPT | Performed by: ORTHOPAEDIC SURGERY

## 2017-10-20 RX ORDER — IBUPROFEN 600 MG/1
600 TABLET ORAL EVERY 6 HOURS PRN
COMMUNITY
End: 2018-04-10

## 2017-10-20 NOTE — PROGRESS NOTES
Subjective:     Patient ID: Murray Regalado is a 22 y.o. male.    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  Localizes majority the pain to posterior joint line of his right shoulder, rates as a 7-8 out of 10, did have initial episodes of tingling and numbness extending down into his ring and small fingers though this has waxed and waned since initial injury.  States he has had prior injury to his rotator cuff which was treated successfully with physical therapy and he had no residual issues from this prior to most recent episode.  He does experience a feeling of his shoulder sliding out of place that he states it is not frankly dislocated since the time of his injury.  Denies any associated neck pain, denies any significant radiation of pain below level the elbow, denies any fevers chills or sweats.     Social History     Occupational History   •  [employer]     Social History Main Topics   • Smoking status: Never Smoker   • Smokeless tobacco: Never Used   • Alcohol use 1.2 oz/week     2 Cans of beer per week      Comment: social   • Drug use: No   • Sexual activity: Not Currently     Partners: Female      Past Medical History:   Diagnosis Date   • Hypertension    • Shoulder injury     right     Past Surgical History:   Procedure Laterality Date   • TONSILLECTOMY     • TOOTH EXTRACTION  05/2017       Family History   Problem Relation Age of Onset   • Diabetes Mother    • COPD Mother      smoker   • Hypertension Father    • Lung cancer Father    • Alcohol abuse Brother          Review of Systems   Constitutional: Negative  "for chills, diaphoresis, fever and unexpected weight change.   HENT: Negative for hearing loss, nosebleeds, sore throat and tinnitus.    Eyes: Negative for pain and visual disturbance.   Respiratory: Negative for cough, shortness of breath and wheezing.    Cardiovascular: Negative for chest pain and palpitations.   Gastrointestinal: Negative for abdominal pain, diarrhea, nausea and vomiting.   Endocrine: Negative for cold intolerance, heat intolerance and polydipsia.   Genitourinary: Negative for difficulty urinating, dysuria and hematuria.   Musculoskeletal: Positive for arthralgias. Negative for joint swelling and myalgias.   Skin: Negative for rash and wound.   Allergic/Immunologic: Negative for environmental allergies.   Neurological: Negative for dizziness, syncope and numbness.   Hematological: Does not bruise/bleed easily.   Psychiatric/Behavioral: Negative for dysphoric mood and sleep disturbance. The patient is not nervous/anxious.    All other systems reviewed and are negative.          Objective:  Vitals:    10/20/17 0922   BP: 136/80   Pulse: 57   Weight: 290 lb (132 kg)   Height: 75\" (190.5 cm)     Last 2 weights    10/20/17  0922   Weight: 290 lb (132 kg)     Body mass index is 36.25 kg/(m^2).  Physical Exam    Vital signs reviewed.   General: No acute distress.  Eyes: conjunctiva clear; pupils equally round and reactive  ENT: external ears and nose atraumatic; oropharynx clear  CV: no peripheral edema  Resp: normal respiratory effort  Skin: no rashes or wounds; normal turgor  Psych: mood and affect appropriate; recent and remote memory intact       Ortho Exam    Right shoulder-active forward flexion 165°, external rotation 50°, Internal rotation T12, 4+ out of 5 strength in all planes of motion.  2+ posterior load and shift test with a positive jerk test, negative Yergason and speed's, mildly positive apprehension and relocation test noted, negative sulcus sign.  Negative drop arm test, negative " external rotation lag sign, minimally positive Little and Neer's, negative crossarm test, no tenderness over acromioclavicular joint.  Positive sensation light touch all distibution's right hand symmetric to the left, brisk cap refill all digits, 2+ radial pulse right wrist.    Imaging:  Review of outside two-view x-rays right shoulder from September 22 as well as radiology report indicates no evidence of fracture, dislocation, or subluxation.    Review of outside MRI right shoulder as well as radiology report indicates posterior labral tear with her labral cyst approximately 12 mm in length, no evidence of nelly chondral loss from glenohumeral joint, mild tendinopathy of rotator cuff appreciated with no evidence of full-thickness tear, no significant degenerative change acromioclavicular joint noted.  Assessment:       1. Tear of right glenoid labrum, initial encounter          Plan:  PACO query complete.          Discussed treatment options at length with patient at today's visit. Given failure of conservative treatment including anti-inflammatory medications, physical therapy, activity modification, and relative rest, as well as persistent feelings of subluxation as well as pain to the posterior joint line and findings on MRI, patient would like to proceed with surgical treatment at this time. Plan will be for right shoulder arthroscopy, labral repair, possible capsulorrhaphy, possible remplissage and all associated procedures.  I reviewed with him details of procedure as well as risks, benefits, and alternatives of the procedure with risks including but not limited to neurovascular damage, bleeding, infection, numbness to upper extremity, recurrent instability, chronic pain, loss of motion, weakness, stiffness, DVT, pulmonary embolus, death, complex regional pain syndrome, and need for additional procedures.  He understood all this had all questions answered prior to verbally consenting to proceed with  surgery. No guarantess were given in regards to results from surgery.  We will proceed with surgery next available date.      Murray Regalado was in agreement with plan and had all questions answered.     Orders:  Orders Placed This Encounter   Procedures   • Follow Anesthesia Guidelines / Standing Orders       Medications:  No orders of the defined types were placed in this encounter.      Followup:  No Follow-up on file.          Dragon transcription disclaimer     Much of this encounter note is an electronic transcription/translation of spoken language to printed text. The electronic translation of spoken language may permit erroneous, or at times, nonsensical words or phrases to be inadvertently transcribed. Although I have reviewed the note for such errors, some may still exist.

## 2017-10-24 PROBLEM — S43.431A TEAR OF RIGHT GLENOID LABRUM: Status: ACTIVE | Noted: 2017-10-24

## 2017-10-24 RX ORDER — OXYCODONE HCL 10 MG/1
10 TABLET, FILM COATED, EXTENDED RELEASE ORAL ONCE
Status: CANCELLED | OUTPATIENT
Start: 2017-11-15 | End: 2017-10-24

## 2017-10-24 RX ORDER — ACETAMINOPHEN 500 MG
1000 TABLET ORAL ONCE
Status: CANCELLED | OUTPATIENT
Start: 2017-11-15 | End: 2017-10-24

## 2017-10-24 RX ORDER — PREGABALIN 75 MG/1
150 CAPSULE ORAL ONCE
Status: CANCELLED | OUTPATIENT
Start: 2017-11-15 | End: 2017-10-24

## 2017-10-24 RX ORDER — MELOXICAM 7.5 MG/1
15 TABLET ORAL ONCE
Status: CANCELLED | OUTPATIENT
Start: 2017-11-15 | End: 2017-10-24

## 2017-10-24 RX ORDER — PANTOPRAZOLE SODIUM 20 MG/1
40 TABLET, DELAYED RELEASE ORAL ONCE
Status: CANCELLED | OUTPATIENT
Start: 2017-11-15 | End: 2017-10-24

## 2017-10-30 ENCOUNTER — PREP FOR SURGERY (OUTPATIENT)
Dept: OTHER | Facility: HOSPITAL | Age: 22
End: 2017-10-30

## 2017-10-30 DIAGNOSIS — S43.431A TEAR OF RIGHT GLENOID LABRUM, INITIAL ENCOUNTER: Primary | ICD-10-CM

## 2017-11-08 ENCOUNTER — APPOINTMENT (OUTPATIENT)
Dept: PREADMISSION TESTING | Facility: HOSPITAL | Age: 22
End: 2017-11-08

## 2017-11-08 ENCOUNTER — ANESTHESIA EVENT (OUTPATIENT)
Dept: PERIOP | Facility: HOSPITAL | Age: 22
End: 2017-11-08

## 2017-11-08 LAB
ANION GAP SERPL CALCULATED.3IONS-SCNC: 13.5 MMOL/L
APTT PPP: 28.8 SECONDS (ref 24.3–38.1)
BASOPHILS # BLD AUTO: 0.07 10*3/MM3 (ref 0–0.2)
BASOPHILS NFR BLD AUTO: 0.7 % (ref 0–2)
BUN BLD-MCNC: 13 MG/DL (ref 6–20)
BUN/CREAT SERPL: 13.7 (ref 7–25)
CALCIUM SPEC-SCNC: 9.2 MG/DL (ref 8.6–10.5)
CHLORIDE SERPL-SCNC: 103 MMOL/L (ref 98–107)
CO2 SERPL-SCNC: 25.5 MMOL/L (ref 22–29)
CREAT BLD-MCNC: 0.95 MG/DL (ref 0.76–1.27)
DEPRECATED RDW RBC AUTO: 41 FL (ref 37–54)
EOSINOPHIL # BLD AUTO: 0.24 10*3/MM3 (ref 0.1–0.3)
EOSINOPHIL NFR BLD AUTO: 2.5 % (ref 0–4)
ERYTHROCYTE [DISTWIDTH] IN BLOOD BY AUTOMATED COUNT: 12.4 % (ref 11.5–14.5)
GFR SERPL CREATININE-BSD FRML MDRD: 97 ML/MIN/1.73
GLUCOSE BLD-MCNC: 93 MG/DL (ref 65–99)
HCT VFR BLD AUTO: 41.9 % (ref 42–52)
HGB BLD-MCNC: 14.8 G/DL (ref 14–18)
IMM GRANULOCYTES # BLD: 0.02 10*3/MM3 (ref 0–0.03)
IMM GRANULOCYTES NFR BLD: 0.2 % (ref 0–0.5)
INR PPP: 1.03 (ref 0.9–1.1)
LYMPHOCYTES # BLD AUTO: 2.41 10*3/MM3 (ref 0.6–4.8)
LYMPHOCYTES NFR BLD AUTO: 24.8 % (ref 20–45)
MCH RBC QN AUTO: 32 PG (ref 27–31)
MCHC RBC AUTO-ENTMCNC: 35.3 G/DL (ref 31–37)
MCV RBC AUTO: 90.7 FL (ref 80–94)
MONOCYTES # BLD AUTO: 0.73 10*3/MM3 (ref 0–1)
MONOCYTES NFR BLD AUTO: 7.5 % (ref 3–8)
NEUTROPHILS # BLD AUTO: 6.25 10*3/MM3 (ref 1.5–8.3)
NEUTROPHILS NFR BLD AUTO: 64.3 % (ref 45–70)
NRBC BLD MANUAL-RTO: 0 /100 WBC (ref 0–0)
PLATELET # BLD AUTO: 216 10*3/MM3 (ref 140–500)
PMV BLD AUTO: 11.4 FL (ref 7.4–10.4)
POTASSIUM BLD-SCNC: 4 MMOL/L (ref 3.5–5.2)
PROTHROMBIN TIME: 13.5 SECONDS (ref 12.1–15)
RBC # BLD AUTO: 4.62 10*6/MM3 (ref 4.7–6.1)
SODIUM BLD-SCNC: 142 MMOL/L (ref 136–145)
WBC NRBC COR # BLD: 9.72 10*3/MM3 (ref 4.8–10.8)

## 2017-11-08 PROCEDURE — 85610 PROTHROMBIN TIME: CPT | Performed by: ORTHOPAEDIC SURGERY

## 2017-11-08 PROCEDURE — 85025 COMPLETE CBC W/AUTO DIFF WBC: CPT | Performed by: ORTHOPAEDIC SURGERY

## 2017-11-08 PROCEDURE — 93010 ELECTROCARDIOGRAM REPORT: CPT | Performed by: INTERNAL MEDICINE

## 2017-11-08 PROCEDURE — 93005 ELECTROCARDIOGRAM TRACING: CPT

## 2017-11-08 PROCEDURE — 85730 THROMBOPLASTIN TIME PARTIAL: CPT | Performed by: ORTHOPAEDIC SURGERY

## 2017-11-08 PROCEDURE — 80048 BASIC METABOLIC PNL TOTAL CA: CPT | Performed by: ORTHOPAEDIC SURGERY

## 2017-11-08 NOTE — PAT
PATIENT HERE FOR PAT VISIT. LABS, EKG COMPLETED. PRE-OP INSTRUCTIONS REVIEWED WITH PT. WILL NEED POTASSIUM LEVEL MERRY CUNNINGHAM RN

## 2017-11-08 NOTE — DISCHARGE INSTRUCTIONS
STOP IBUPROFEN FOR SURGERY    INSTRUCTED TO STOP CLEARS/GATORADE 2 HOURS PRIOR TO ARRIVAL    PRE-ADMISSION TESTING INSTRUCTIONS FOR ADULTS      General Instructions:    • Do not eat solid food after midnight the night before surgery.  No gum, mints, or hard candy after midnight the night before surgery.  • You may drink clear liquids the day of surgery up until 2 hours before your arrival time.  • Clear liquids are liquids you can see through. Nothing RED in color.    Plain water    Sports drinks  Sodas     Gelatin (Jell-O)  Fruit juices without pulp such as white grape juice and apple juice  Popsicles that contain no fruit or yogurt  Tea or coffee (no cream or milk added)    • It is beneficial for you to have a clear drink that contains carbohydrates just before you leave your house and before your fasting time begins.  We suggest a 20 ounce bottle of Gatorade or Powerade for non-diabetic patients or a 20 ounce bottle of G2 or Powerade Zero for diabetic patients.     • Patients who avoid smoking, chewing tobacco and alcohol for 4 weeks prior to surgery have a reduced risk of post-operative complications.  • Do not smoke, use chewing tobacco or drink alcohol the day of surgery    • Bring your C-PAP/ BI-PAP machine if you use one.  • Wear clean comfortable clothes and socks.  • Do not wear contact lenses, lotion, deodorant, or make-up.  Bring a case for your glasses if applicable.   • Bring crutches or walker if applicable.  • Leave all other valuables and jewelry at home.      Preventing a Surgical Site Infection:    • Shower the night before and on the morning of surgery using the chlorhexidine soap you were given.  Use a clean washcloth with the soap.  Place clean sheets on your bed after showering the night before surgery. Do not use the CHG soap on your hair, face, or private areas. Wash your body gently for five (5) minutes. Do not scrub your skin too hard.  Dry with a clean towel and dress in clean  clothing.    • Do not shave the surgical area for 10 days-2 weeks prior to surgery  because the razor can irritate skin and make it easier to develop an infection.    • Make sure you, your family, and all healthcare providers clean their hands with soap and water or an alcohol based hand  before caring for you or your wound.    • If at all possible, quit smoking as many days before surgery as you can.    Day of surgery:    Your surgeon’s office will advise you of your arrival time for the day of surgery.    Upon arrival, a Pre-op nurse and Anesthesia provider will review your health history, obtain vital signs, and answer questions you may have.  The only belongings needed at this time will be your home medications and if applicable your C-PAP/BI-PAP machine.  If you are staying overnight your family can leave the rest of your belongings in the car and bring them to your room later.  A Pre-op nurse will start an IV and you may receive medication in preparation for surgery, including something to help you relax.  Your family will be able to see you in the Pre-op area.  While you are in surgery your family should notify the waiting room  if they leave the waiting room area and provide a contact phone number.    IF you have any questions, you can call the Pre-Admission Department at [phone] or your surgeon's office.    Please be aware that surgery does come with discomfort.  We want to make every effort to control your discomfort so please discuss any uncontrolled symptoms with your nurse.   Your doctor will most likely have prescribed pain medications.      If you are going home after surgery, you will receive individualized written care instructions before being discharged.  A responsible adult (over the age of 18) must drive you to and from the hospital on the day of your surgery and stay with you for 24 hours after anesthesia.    If you are staying overnight following surgery, you will  be transported to your hospital room following the recovery period.  [facility] has all private rooms.    Deductibles and co-payments are collected on the day of service. Please be prepared to pay the required co-pay, deductible or deposit on the day of service as defined by your plan.

## 2017-11-15 ENCOUNTER — ANESTHESIA (OUTPATIENT)
Dept: PERIOP | Facility: HOSPITAL | Age: 22
End: 2017-11-15

## 2017-11-15 ENCOUNTER — HOSPITAL ENCOUNTER (OUTPATIENT)
Facility: HOSPITAL | Age: 22
Setting detail: HOSPITAL OUTPATIENT SURGERY
Discharge: HOME | End: 2017-11-15
Attending: ORTHOPAEDIC SURGERY | Admitting: ORTHOPAEDIC SURGERY

## 2017-11-15 VITALS
RESPIRATION RATE: 16 BRPM | HEART RATE: 99 BPM | BODY MASS INDEX: 37.87 KG/M2 | HEIGHT: 75 IN | TEMPERATURE: 97.8 F | OXYGEN SATURATION: 93 % | SYSTOLIC BLOOD PRESSURE: 141 MMHG | WEIGHT: 304.6 LBS | DIASTOLIC BLOOD PRESSURE: 77 MMHG

## 2017-11-15 DIAGNOSIS — S43.431A TEAR OF RIGHT GLENOID LABRUM, INITIAL ENCOUNTER: ICD-10-CM

## 2017-11-15 LAB
GLUCOSE BLDC GLUCOMTR-MCNC: 110 MG/DL (ref 70–130)
POTASSIUM BLD-SCNC: 3.7 MMOL/L (ref 3.5–5.2)

## 2017-11-15 PROCEDURE — 25010000002 ROPIVACAINE PER 1 MG: Performed by: ANESTHESIOLOGY

## 2017-11-15 PROCEDURE — 25010000002 EPINEPHRINE PER 0.1 MG: Performed by: ORTHOPAEDIC SURGERY

## 2017-11-15 PROCEDURE — 25010000002 DEXAMETHASONE PER 1 MG: Performed by: ANESTHESIOLOGY

## 2017-11-15 PROCEDURE — 25010000003 CEFAZOLIN PER 500 MG: Performed by: ORTHOPAEDIC SURGERY

## 2017-11-15 PROCEDURE — 25010000002 MIDAZOLAM PER 1 MG: Performed by: ANESTHESIOLOGY

## 2017-11-15 PROCEDURE — C1713 ANCHOR/SCREW BN/BN,TIS/BN: HCPCS | Performed by: ORTHOPAEDIC SURGERY

## 2017-11-15 PROCEDURE — 84132 ASSAY OF SERUM POTASSIUM: CPT | Performed by: ORTHOPAEDIC SURGERY

## 2017-11-15 PROCEDURE — 82962 GLUCOSE BLOOD TEST: CPT

## 2017-11-15 PROCEDURE — 25010000002 ONDANSETRON PER 1 MG: Performed by: ANESTHESIOLOGY

## 2017-11-15 PROCEDURE — 29806 SHO ARTHRS SRG CAPSULORRAPHY: CPT | Performed by: ORTHOPAEDIC SURGERY

## 2017-11-15 PROCEDURE — 25010000002 FENTANYL CITRATE (PF) 100 MCG/2ML SOLUTION: Performed by: NURSE ANESTHETIST, CERTIFIED REGISTERED

## 2017-11-15 PROCEDURE — 25010000002 PROPOFOL 10 MG/ML EMULSION: Performed by: NURSE ANESTHETIST, CERTIFIED REGISTERED

## 2017-11-15 DEVICE — BIORAPTOR CURVED 2.3 PK SUTURE                                    ANCHOR, WITH ONE ULTRABRAID NO.2                                    SUTURE COBRAID-BLUE
Type: IMPLANTABLE DEVICE | Site: SHOULDER | Status: FUNCTIONAL
Brand: BIORAPTOR

## 2017-11-15 RX ORDER — ACETAMINOPHEN 500 MG
1000 TABLET ORAL ONCE
Status: COMPLETED | OUTPATIENT
Start: 2017-11-15 | End: 2017-11-15

## 2017-11-15 RX ORDER — OXYCODONE HYDROCHLORIDE AND ACETAMINOPHEN 5; 325 MG/1; MG/1
1 TABLET ORAL ONCE AS NEEDED
Status: DISCONTINUED | OUTPATIENT
Start: 2017-11-15 | End: 2017-11-15 | Stop reason: HOSPADM

## 2017-11-15 RX ORDER — MEPERIDINE HYDROCHLORIDE 25 MG/ML
12.5 INJECTION INTRAMUSCULAR; INTRAVENOUS; SUBCUTANEOUS
Status: DISCONTINUED | OUTPATIENT
Start: 2017-11-15 | End: 2017-11-15 | Stop reason: HOSPADM

## 2017-11-15 RX ORDER — OXYCODONE HYDROCHLORIDE AND ACETAMINOPHEN 5; 325 MG/1; MG/1
1-2 TABLET ORAL EVERY 4 HOURS PRN
Qty: 80 TABLET | Refills: 0 | Status: SHIPPED | OUTPATIENT
Start: 2017-11-15 | End: 2018-04-10

## 2017-11-15 RX ORDER — SODIUM CHLORIDE 0.9 % (FLUSH) 0.9 %
1-10 SYRINGE (ML) INJECTION AS NEEDED
Status: DISCONTINUED | OUTPATIENT
Start: 2017-11-15 | End: 2017-11-15 | Stop reason: HOSPADM

## 2017-11-15 RX ORDER — DEXAMETHASONE SODIUM PHOSPHATE 4 MG/ML
8 INJECTION, SOLUTION INTRA-ARTICULAR; INTRALESIONAL; INTRAMUSCULAR; INTRAVENOUS; SOFT TISSUE ONCE AS NEEDED
Status: COMPLETED | OUTPATIENT
Start: 2017-11-15 | End: 2017-11-15

## 2017-11-15 RX ORDER — FENTANYL CITRATE 50 UG/ML
INJECTION, SOLUTION INTRAMUSCULAR; INTRAVENOUS AS NEEDED
Status: DISCONTINUED | OUTPATIENT
Start: 2017-11-15 | End: 2017-11-15 | Stop reason: SURG

## 2017-11-15 RX ORDER — SODIUM CHLORIDE 9 MG/ML
INJECTION, SOLUTION INTRAVENOUS AS NEEDED
Status: DISCONTINUED | OUTPATIENT
Start: 2017-11-15 | End: 2017-11-15 | Stop reason: HOSPADM

## 2017-11-15 RX ORDER — SODIUM CHLORIDE 9 MG/ML
40 INJECTION, SOLUTION INTRAVENOUS AS NEEDED
Status: DISCONTINUED | OUTPATIENT
Start: 2017-11-15 | End: 2017-11-15 | Stop reason: HOSPADM

## 2017-11-15 RX ORDER — ROPIVACAINE HYDROCHLORIDE 5 MG/ML
INJECTION, SOLUTION EPIDURAL; INFILTRATION; PERINEURAL AS NEEDED
Status: DISCONTINUED | OUTPATIENT
Start: 2017-11-15 | End: 2017-11-15 | Stop reason: SURG

## 2017-11-15 RX ORDER — LIDOCAINE HYDROCHLORIDE 10 MG/ML
0.5 INJECTION, SOLUTION EPIDURAL; INFILTRATION; INTRACAUDAL; PERINEURAL ONCE AS NEEDED
Status: COMPLETED | OUTPATIENT
Start: 2017-11-15 | End: 2017-11-15

## 2017-11-15 RX ORDER — FAMOTIDINE 10 MG/ML
20 INJECTION, SOLUTION INTRAVENOUS
Status: DISCONTINUED | OUTPATIENT
Start: 2017-11-15 | End: 2017-11-15

## 2017-11-15 RX ORDER — ONDANSETRON 2 MG/ML
4 INJECTION INTRAMUSCULAR; INTRAVENOUS ONCE AS NEEDED
Status: DISCONTINUED | OUTPATIENT
Start: 2017-11-15 | End: 2017-11-15 | Stop reason: HOSPADM

## 2017-11-15 RX ORDER — LIDOCAINE HYDROCHLORIDE AND EPINEPHRINE 10; 10 MG/ML; UG/ML
INJECTION, SOLUTION INFILTRATION; PERINEURAL AS NEEDED
Status: DISCONTINUED | OUTPATIENT
Start: 2017-11-15 | End: 2017-11-15 | Stop reason: HOSPADM

## 2017-11-15 RX ORDER — MIDAZOLAM HYDROCHLORIDE 1 MG/ML
1 INJECTION INTRAMUSCULAR; INTRAVENOUS
Status: DISCONTINUED | OUTPATIENT
Start: 2017-11-15 | End: 2017-11-15 | Stop reason: HOSPADM

## 2017-11-15 RX ORDER — ONDANSETRON 2 MG/ML
4 INJECTION INTRAMUSCULAR; INTRAVENOUS ONCE AS NEEDED
Status: COMPLETED | OUTPATIENT
Start: 2017-11-15 | End: 2017-11-15

## 2017-11-15 RX ORDER — SENNOSIDES 8.6 MG
1 TABLET ORAL NIGHTLY
Qty: 20 TABLET | Refills: 0 | Status: SHIPPED | OUTPATIENT
Start: 2017-11-15 | End: 2017-12-15

## 2017-11-15 RX ORDER — LIDOCAINE HYDROCHLORIDE 20 MG/ML
INJECTION, SOLUTION INFILTRATION; PERINEURAL AS NEEDED
Status: DISCONTINUED | OUTPATIENT
Start: 2017-11-15 | End: 2017-11-15 | Stop reason: SURG

## 2017-11-15 RX ORDER — SODIUM CHLORIDE, SODIUM LACTATE, POTASSIUM CHLORIDE, CALCIUM CHLORIDE 600; 310; 30; 20 MG/100ML; MG/100ML; MG/100ML; MG/100ML
100 INJECTION, SOLUTION INTRAVENOUS CONTINUOUS
Status: DISCONTINUED | OUTPATIENT
Start: 2017-11-15 | End: 2017-11-15 | Stop reason: HOSPADM

## 2017-11-15 RX ORDER — ONDANSETRON 4 MG/1
4 TABLET, FILM COATED ORAL EVERY 8 HOURS PRN
Qty: 30 TABLET | Refills: 0 | Status: SHIPPED | OUTPATIENT
Start: 2017-11-15 | End: 2017-12-15

## 2017-11-15 RX ORDER — SODIUM CHLORIDE, SODIUM LACTATE, POTASSIUM CHLORIDE, CALCIUM CHLORIDE 600; 310; 30; 20 MG/100ML; MG/100ML; MG/100ML; MG/100ML
9 INJECTION, SOLUTION INTRAVENOUS CONTINUOUS
Status: DISCONTINUED | OUTPATIENT
Start: 2017-11-15 | End: 2017-11-15 | Stop reason: HOSPADM

## 2017-11-15 RX ORDER — PANTOPRAZOLE SODIUM 20 MG/1
40 TABLET, DELAYED RELEASE ORAL ONCE
Status: COMPLETED | OUTPATIENT
Start: 2017-11-15 | End: 2017-11-15

## 2017-11-15 RX ORDER — MIDAZOLAM HYDROCHLORIDE 1 MG/ML
2 INJECTION INTRAMUSCULAR; INTRAVENOUS
Status: DISCONTINUED | OUTPATIENT
Start: 2017-11-15 | End: 2017-11-15 | Stop reason: HOSPADM

## 2017-11-15 RX ORDER — PREGABALIN 75 MG/1
150 CAPSULE ORAL ONCE
Status: COMPLETED | OUTPATIENT
Start: 2017-11-15 | End: 2017-11-15

## 2017-11-15 RX ORDER — MELOXICAM 7.5 MG/1
15 TABLET ORAL ONCE
Status: COMPLETED | OUTPATIENT
Start: 2017-11-15 | End: 2017-11-15

## 2017-11-15 RX ORDER — OXYCODONE HCL 10 MG/1
10 TABLET, FILM COATED, EXTENDED RELEASE ORAL ONCE
Status: COMPLETED | OUTPATIENT
Start: 2017-11-15 | End: 2017-11-15

## 2017-11-15 RX ORDER — PROPOFOL 10 MG/ML
VIAL (ML) INTRAVENOUS AS NEEDED
Status: DISCONTINUED | OUTPATIENT
Start: 2017-11-15 | End: 2017-11-15 | Stop reason: SURG

## 2017-11-15 RX ADMIN — SODIUM CHLORIDE, POTASSIUM CHLORIDE, SODIUM LACTATE AND CALCIUM CHLORIDE: 600; 310; 30; 20 INJECTION, SOLUTION INTRAVENOUS at 10:20

## 2017-11-15 RX ADMIN — DEXAMETHASONE SODIUM PHOSPHATE 8 MG: 4 INJECTION, SOLUTION INTRAMUSCULAR; INTRAVENOUS at 08:20

## 2017-11-15 RX ADMIN — MIDAZOLAM HYDROCHLORIDE 1 MG: 1 INJECTION, SOLUTION INTRAMUSCULAR; INTRAVENOUS at 09:24

## 2017-11-15 RX ADMIN — PREGABALIN 150 MG: 75 CAPSULE ORAL at 07:49

## 2017-11-15 RX ADMIN — ROPIVACAINE HYDROCHLORIDE 20 ML: 5 INJECTION, SOLUTION EPIDURAL; INFILTRATION; PERINEURAL at 09:27

## 2017-11-15 RX ADMIN — PROPOFOL 250 MG: 10 INJECTION, EMULSION INTRAVENOUS at 10:38

## 2017-11-15 RX ADMIN — PANTOPRAZOLE SODIUM 40 MG: 20 TABLET, DELAYED RELEASE ORAL at 07:49

## 2017-11-15 RX ADMIN — LIDOCAINE HYDROCHLORIDE 0.5 ML: 10 INJECTION, SOLUTION EPIDURAL; INFILTRATION; INTRACAUDAL; PERINEURAL at 07:51

## 2017-11-15 RX ADMIN — ACETAMINOPHEN 1000 MG: 500 TABLET, FILM COATED ORAL at 07:48

## 2017-11-15 RX ADMIN — LIDOCAINE HYDROCHLORIDE 100 MG: 20 INJECTION, SOLUTION INFILTRATION; PERINEURAL at 10:37

## 2017-11-15 RX ADMIN — SODIUM CHLORIDE, POTASSIUM CHLORIDE, SODIUM LACTATE AND CALCIUM CHLORIDE 9 ML/HR: 600; 310; 30; 20 INJECTION, SOLUTION INTRAVENOUS at 07:51

## 2017-11-15 RX ADMIN — MELOXICAM 15 MG: 7.5 TABLET ORAL at 07:49

## 2017-11-15 RX ADMIN — ONDANSETRON 4 MG: 2 INJECTION, SOLUTION INTRAMUSCULAR; INTRAVENOUS at 08:20

## 2017-11-15 RX ADMIN — OXYCODONE HYDROCHLORIDE AND ACETAMINOPHEN 1 TABLET: 5; 325 TABLET ORAL at 13:10

## 2017-11-15 RX ADMIN — OXYCODONE HYDROCHLORIDE 10 MG: 10 TABLET, FILM COATED, EXTENDED RELEASE ORAL at 07:49

## 2017-11-15 RX ADMIN — MIDAZOLAM HYDROCHLORIDE 1 MG: 1 INJECTION, SOLUTION INTRAMUSCULAR; INTRAVENOUS at 08:20

## 2017-11-15 RX ADMIN — CEFAZOLIN 3 G: 1 INJECTION, POWDER, FOR SOLUTION INTRAMUSCULAR; INTRAVENOUS at 10:36

## 2017-11-15 RX ADMIN — FENTANYL CITRATE 50 MCG: 50 INJECTION, SOLUTION INTRAMUSCULAR; INTRAVENOUS at 10:36

## 2017-11-15 NOTE — ANESTHESIA PROCEDURE NOTES
Peripheral Block    Patient location during procedure: pre-op  Start time: 11/15/2017 9:24 AM  Stop time: 11/15/2017 9:27 AM  Reason for block: procedure for pain and at surgeon's request  Performed by  Anesthesiologist: MICHAEL CORNELIUS  Preanesthetic Checklist  Completed: patient identified, site marked, surgical consent, pre-op evaluation, timeout performed, IV checked, risks and benefits discussed and monitors and equipment checked  Prep:  Pt Position: sitting  Sterile barriers:cap  Prep: ChloraPrep  Patient monitoring: blood pressure monitoring, continuous pulse oximetry and EKG  Procedure  Sedation:yes  Performed under: PNB  Guidance:ultrasound guided  ULTRASOUND INTERPRETATION.  Using ultrasound guidance a 21 G gauge needle was placed in close proximity to the brachial plexus nerve, at which point, under ultrasound guidance anesthetic was injected in the area of the nerve and spread of the anesthesia was seen on ultrasound in close proximity thereto.  There were no abnormalities seen on ultrasound; a digital image was taken; and the patient tolerated the procedure with no complications. Images:still images not obtained    Laterality:right  Block Type:interscalene  Injection Technique:single-shot  Needle Type:echogenic  Needle Gauge:21 G  Resistance on Injection: none  Medications  Local Injected:ropivacaine 0.5% Local Amount Injected:20mL

## 2017-11-15 NOTE — ANESTHESIA POSTPROCEDURE EVALUATION
Patient: Murray Regalado    Procedure Summary     Date Anesthesia Start Anesthesia Stop Room / Location    11/15/17 1030 1219 [facility] OR 2 / [facility]       Procedure Diagnosis Surgeon Provider    SHOULDER ARTHROSCOPY, labral repair, and all associated procedures (Right Shoulder) Tear of right glenoid labrum, initial encounter  (Tear of right glenoid labrum, initial encounter [S43.431A]) MD Anuel Joe CRNA          Anesthesia Type: general  Last vitals  BP   158/84 (11/15/17 1340)   Temp   97.8 °F (36.6 °C) (11/15/17 1252)   Pulse   99 (11/15/17 1340)   Resp   16 (11/15/17 1340)     SpO2   92 % (11/15/17 1340)     Post Anesthesia Care and Evaluation    Patient location during evaluation: bedside  Patient participation: complete - patient participated  Level of consciousness: awake and alert  Pain score: 0  Pain management: adequate  Airway patency: patent  Anesthetic complications: No anesthetic complications  PONV Status: none  Cardiovascular status: acceptable  Respiratory status: acceptable  Hydration status: acceptable

## 2017-11-15 NOTE — ANESTHESIA PREPROCEDURE EVALUATION
Anesthesia Evaluation     Patient summary reviewed and Nursing notes reviewed   no history of anesthetic complications:  NPO Solid Status: > 8 hours  NPO Liquid Status: > 2 hours     Airway   Mallampati: III  TM distance: >3 FB  Neck ROM: full  possible difficult intubation  Dental - normal exam     Pulmonary - normal exam    breath sounds clear to auscultation  Asthma: as a kid, no problems as adult. Sleep apnea: snores a little.  Cardiovascular   Exercise tolerance: good (4-7 METS)    Rhythm: regular  Rate: normal    (+) hypertension well controlled,     ROS comment: ST Axis= 66 deg  SINUS RHYTHM  NO PRIOR TRACING AVAILABLE FOR COMPARISON  Electronically Signed by:  Josr Orr 09-Nov-2017 10:20:31  Date and Time of Study: 2017-11-08 10:39:23    Specimen Collected: 11/08/17 10:39 AM Last Resulted: 11/09/17 10:20 AM          Neuro/Psych  (+) numbness (right hand fingers 4 and 5 after initial shoulder injury, resolved),      ROS Comment: [place] Palsy 3 years ago, resolved with steroids.  No residual.    GI/Hepatic/Renal/Endo    (+)  GERD (Tums prn),     Musculoskeletal     Abdominal  - normal exam   Substance History   Alcohol use: occ.     OB/GYN negative ob/gyn ROS         Other   (+) arthritis (shoulder)     ROS/Med Hx Other: gatorade finished before 6:30                                Anesthesia Plan    ASA 2     general     intravenous induction   Anesthetic plan and risks discussed with patient and spouse/significant other.

## 2017-11-15 NOTE — ANESTHESIA PROCEDURE NOTES
Airway  Urgency: elective    Airway not difficult    General Information and Staff    Patient location during procedure: OR  CRNA: JOSE MONTALVO    Indications and Patient Condition  Indications for airway management: airway protection    Preoxygenated: yes  MILS not maintained throughout  Mask difficulty assessment: 0 - not attempted    Final Airway Details  Final airway type: supraglottic airway      Successful airway: classic  Size 5    Number of attempts at approach: 1    Additional Comments  Atraumatic.  LMA good seal

## 2017-11-22 ENCOUNTER — OFFICE VISIT (OUTPATIENT)
Dept: ORTHOPEDIC SURGERY | Facility: CLINIC | Age: 22
End: 2017-11-22

## 2017-11-22 VITALS — BODY MASS INDEX: 36.68 KG/M2 | WEIGHT: 295 LBS | HEIGHT: 75 IN

## 2017-11-22 DIAGNOSIS — Z98.890 STATUS POST ARTHROSCOPY OF SHOULDER: ICD-10-CM

## 2017-11-22 DIAGNOSIS — S43.431D TEAR OF RIGHT GLENOID LABRUM, SUBSEQUENT ENCOUNTER: Primary | ICD-10-CM

## 2017-11-22 PROCEDURE — 99024 POSTOP FOLLOW-UP VISIT: CPT | Performed by: NURSE PRACTITIONER

## 2017-11-22 NOTE — PROGRESS NOTES
CC: F/u s/p right SHOULDER ARTHROSCOPY, labral repair, and all associated procedures  DOS 11/15/2017    Interval History: Patient returns to clinic stating pain is doing fairly well, has been using sling as instructed, denies any numbness or tingling over right arm. No fevers, chills, or sweats, and no drainage from incisions noted.    Exam:   Right shoulder- incisions clean, dry, sutures in place   Positive sensation all distributions right hand and proximal lateral aspect arm, positive deltoid firing   Cap refill < 3 seconds, radial pulse 2+   Positive deltoid firing   Flex/extend fingers/thumb/wrist with 4+/5 strength, positive thumbs up, okay sign, cross finger adduction and abduction against resistance     Impression: s/p right SHOULDER ARTHROSCOPY, labral repair, and all associated procedures     Plan:  1. D/c sutures today and replace with steri-strips- may shower, no submerging wounds x 4 weeks  2. F/u in 3 wks with Dr Martinez  3. Will call patient with information on when to start PT. Continue sling at all times for now. Work on finger and wrist ROM. May do gentle elbow ROM 2x/day while out of sling for showering or changing clothes.   4. All questions answered.

## 2017-11-27 ENCOUNTER — TELEPHONE (OUTPATIENT)
Dept: ORTHOPEDIC SURGERY | Facility: CLINIC | Age: 22
End: 2017-11-27

## 2017-11-27 DIAGNOSIS — S43.431D TEAR OF RIGHT GLENOID LABRUM, SUBSEQUENT ENCOUNTER: Primary | ICD-10-CM

## 2017-11-27 DIAGNOSIS — S43.431A TEAR OF RIGHT GLENOID LABRUM, INITIAL ENCOUNTER: ICD-10-CM

## 2017-11-27 DIAGNOSIS — Z98.890 STATUS POST ARTHROSCOPY OF SHOULDER: ICD-10-CM

## 2017-11-27 NOTE — TELEPHONE ENCOUNTER
I got it. He needed to see where he was able to go first. Jazmine, this is completed. To be started at Week 6 post-op, please have him call to schedule. Thanks

## 2017-11-27 NOTE — TELEPHONE ENCOUNTER
Patient called and wanted me to let both Kasey and Dr Martinez know that he would like to attend PT at [facility] once he is to begin. I guess he will need orders once it has been determined when he is to start PT.

## 2017-12-15 ENCOUNTER — OFFICE VISIT (OUTPATIENT)
Dept: ORTHOPEDIC SURGERY | Facility: CLINIC | Age: 22
End: 2017-12-15

## 2017-12-15 DIAGNOSIS — Z98.890 STATUS POST ARTHROSCOPY OF SHOULDER: Primary | ICD-10-CM

## 2017-12-15 PROCEDURE — 99024 POSTOP FOLLOW-UP VISIT: CPT | Performed by: ORTHOPAEDIC SURGERY

## 2017-12-15 RX ORDER — MELOXICAM 7.5 MG/1
7.5 TABLET ORAL DAILY
Qty: 30 TABLET | Refills: 0 | Status: SHIPPED | OUTPATIENT
Start: 2017-12-15 | End: 2018-04-10

## 2017-12-20 ENCOUNTER — TREATMENT (OUTPATIENT)
Dept: PHYSICAL THERAPY | Facility: CLINIC | Age: 22
End: 2017-12-20

## 2017-12-20 DIAGNOSIS — Z98.890 STATUS POST LABRAL REPAIR OF SHOULDER: Primary | ICD-10-CM

## 2017-12-20 PROCEDURE — 97001 PR PHYS THERAPY EVALUATION: CPT | Performed by: PHYSICAL THERAPIST

## 2017-12-20 PROCEDURE — 97110 THERAPEUTIC EXERCISES: CPT | Performed by: PHYSICAL THERAPIST

## 2017-12-20 PROCEDURE — 97140 MANUAL THERAPY 1/> REGIONS: CPT | Performed by: PHYSICAL THERAPIST

## 2017-12-20 NOTE — PROGRESS NOTES
Physical Therapy Initial Evaluation and Plan of Care      Patient: Murray Regalado   : 1995  Diagnosis/ICD-10 Code:  Status post labral repair of shoulder [Z98.890]  Referring practitioner: MIKALA Felix    Subjective Evaluation    History of Present Illness  Date of surgery: 11/15/2017  Mechanism of injury: 2017 doing CPR. Xray , PT 2 weeks. Then MRI  Only labral tear, RTC good.  R handed  Off work since injury  See Dr. Martinez 2017  RTC injury 2 years ago. PT at [facility]      Patient Occupation: EMT Quality of life: good    Pain  Current pain ratin  At worst pain ratin  Quality: dull ache and tight  Relieving factors: medications (Meloxicam)  Aggravating factors: sleeping (weaning off sling)  Progression: improved    Social Support  Lives with: spouse    Hand dominance: right    Diagnostic Tests  X-ray: abnormal  MRI studies: abnormal    Treatments  Previous treatment: physical therapy  Patient Goals  Patient goals for therapy: decreased pain, independence with ADLs/IADLs, return to sport/leisure activities and increased strength  Patient goal: lift >100#; transfer pts, push/pull           Objective       Active Range of Motion   Left Shoulder   Normal active range of motion    Right Shoulder   Flexion: 85 degrees with pain  Abduction: 75 degrees with pain    Passive Range of Motion     Right Shoulder   Flexion: 100 degrees   Abduction: 95 degrees   External rotation 45°: 10 degrees   Internal rotation 45°: 45 degrees     Strength/Myotome Testing     Right Shoulder     Planes of Motion   Right shoulder forward flexion strength: NT.   Right shoulder abduction strength: NT.   External rotation at 0°: 4 (mild isometric)   Internal rotation at 45°: 4     Isolated Muscles   Biceps: 4+ (sore)   Triceps: 5          Assessment & Plan     Assessment  Impairments: abnormal or restricted ROM, activity intolerance, impaired physical strength, lacks appropriate home exercise program and pain  with function  Assessment details: Pt is a good candidate for skilled PT intervention to restore functional AROM and strength to return to previous level of ADL's.  Prognosis: good  Prognosis details:     Short term Goals ( 2 weeks)    1. Pt to demonstrate proper scapulohumeral alignment to allow for improved AROM with less pain  2. Pt to exhibit PROM to 140 degrees of shoulder flexion and 140 degrees of abduction to allow for necessary reaching  3. Pt to be independent with HEP  4. Pt to exhibit 4/5 ER/IR strength to allow for dressing and reaching    Long Term  ( 6 weeks):  1. Pt to exhibit shoulder flexion AROM to 170 degrees to allow for ADL's  and personal care  2. Pt to exhibit 5/5 strength throughout RTC musculature to allow for pushing, pulling activities with less pain.  3. Pt to perform work related and recreational tasks with min to no pain  4. Pt to score <10% on DASH  Functional Limitations: carrying objects, lifting, sleeping, pulling, pushing, reaching behind back, reaching overhead and unable to perform repetitive tasks  Plan  Therapy options: will be seen for skilled physical therapy services  Planned modality interventions: cryotherapy and electrical stimulation/Russian stimulation  Planned therapy interventions: functional ROM exercises, home exercise program, manual therapy, postural training, soft tissue mobilization, strengthening, stretching and therapeutic activities  Frequency: 3x week  Duration in weeks: 6  Treatment plan discussed with: patient        Manual Therapy:    15     mins  22599;  Therapeutic Exercise:    8     mins  17943;     Neuromuscular Logan:        mins  86773;    Therapeutic Activity:          mins  62326;     Gait Training:           mins  71316;     Ultrasound:          mins  93509;    Electrical Stimulation:   15      mins  83583 ( );  Dry Needling          mins self-pay    Timed Treatment:  23   mins   Total Treatment:     60   mins    PT SIGNATURE: Bridgette COTTO  [facility]   [place] License # [id number]  DATE TREATMENT INITIATED: 12/20/2017    Initial Certification  Certification Period: 3/20/2018  I certify that the therapy services are furnished while this patient is under my care.  The services outlined above are required by this patient, and will be reviewed every 90 days.     PHYSICIAN: Kasey Raygoza, MIKALA      DATE:     Please sign and return via fax to [phone].. Thank you, [facility].

## 2017-12-27 ENCOUNTER — TREATMENT (OUTPATIENT)
Dept: PHYSICAL THERAPY | Facility: CLINIC | Age: 22
End: 2017-12-27

## 2017-12-27 DIAGNOSIS — Z98.890 STATUS POST LABRAL REPAIR OF SHOULDER: Primary | ICD-10-CM

## 2017-12-27 PROCEDURE — 97110 THERAPEUTIC EXERCISES: CPT | Performed by: PHYSICAL THERAPIST

## 2017-12-27 PROCEDURE — 97140 MANUAL THERAPY 1/> REGIONS: CPT | Performed by: PHYSICAL THERAPIST

## 2017-12-27 PROCEDURE — 97014 ELECTRIC STIMULATION THERAPY: CPT | Performed by: PHYSICAL THERAPIST

## 2017-12-27 NOTE — PROGRESS NOTES
Physical Therapy Daily Progress Note    Time In 14:00  Time Out 15:01    Visit # : 2  Murray Regalado reports: his shoulder is doing much better since he was able to get out of the sling a few days ago.  States he was a little sore after the initial visit, but not bad.      Subjective     Objective   See Exercise, Manual, and Modality Logs for complete treatment.       Assessment & Plan     Assessment  Assessment details: Pt tolerated all PROM stretches and new supine AAROM exercises.  Pt still has moderate tightness with ER PROM with a firm end feel, but slowly progressing per protocol.  Will continue to see 2-3x/week for stretching, strengthening, modalities PRN.          Progress per Plan of Care           Manual Therapy:   15      mins  23570;  Therapeutic Exercise:   15      mins  69930;     Neuromuscular Logan:        mins  94465;    Therapeutic Activity:          mins  80284;     Gait Training:           mins  13226;     Ultrasound:          mins  49929;    Electrical Stimulation:   15      mins  09466 ( );  Dry Needling          mins self-pay    Timed Treatment:  30    mins   Total Treatment:     61   mins    Adele Carpio, PT  Physical Therapist

## 2017-12-28 ENCOUNTER — TREATMENT (OUTPATIENT)
Dept: PHYSICAL THERAPY | Facility: CLINIC | Age: 22
End: 2017-12-28

## 2017-12-28 DIAGNOSIS — Z98.890 STATUS POST LABRAL REPAIR OF SHOULDER: Primary | ICD-10-CM

## 2017-12-28 PROCEDURE — 97014 ELECTRIC STIMULATION THERAPY: CPT | Performed by: PHYSICAL THERAPIST

## 2017-12-28 PROCEDURE — 97110 THERAPEUTIC EXERCISES: CPT | Performed by: PHYSICAL THERAPIST

## 2017-12-28 PROCEDURE — 97140 MANUAL THERAPY 1/> REGIONS: CPT | Performed by: PHYSICAL THERAPIST

## 2017-12-28 NOTE — PROGRESS NOTES
Physical Therapy Daily Progress Note    Time In 13:36  Time Out 14:35    Visit # : 3  Murray Regalado reports: his shoulder is more sore and tight today.     Subjective     Objective   See Exercise, Manual, and Modality Logs for complete treatment.       Assessment & Plan     Assessment  Assessment details: Pt tolerated all PROM stretches fairly well today, but a little more painful possibly due to having two consecutive treatments.  Pt still has moderate tightness with ER PROM with a firm end feel, but slowly progressing per protocol.  Will continue to see 2-3x/week for stretching, strengthening, modalities PRN.          Progress per Plan of Care           Manual Therapy:    15     mins  11500;  Therapeutic Exercise:    15     mins  54760;     Neuromuscular Logan:        mins  68585;    Therapeutic Activity:          mins  92512;     Gait Training:           mins  40984;     Ultrasound:          mins  05707;    Electrical Stimulation:    15     mins  05647 ( );  Dry Needling          mins self-pay    Timed Treatment:   30   mins   Total Treatment:     59   mins    Adele Carpio, PT  Physical Therapist

## 2018-01-02 ENCOUNTER — OFFICE VISIT (OUTPATIENT)
Dept: FAMILY MEDICINE CLINIC | Facility: CLINIC | Age: 23
End: 2018-01-02

## 2018-01-02 ENCOUNTER — TREATMENT (OUTPATIENT)
Dept: PHYSICAL THERAPY | Facility: CLINIC | Age: 23
End: 2018-01-02

## 2018-01-02 VITALS
HEART RATE: 89 BPM | WEIGHT: 312.5 LBS | DIASTOLIC BLOOD PRESSURE: 84 MMHG | TEMPERATURE: 98 F | OXYGEN SATURATION: 97 % | SYSTOLIC BLOOD PRESSURE: 136 MMHG | BODY MASS INDEX: 38.86 KG/M2 | HEIGHT: 75 IN

## 2018-01-02 DIAGNOSIS — Z98.890 STATUS POST LABRAL REPAIR OF SHOULDER: Primary | ICD-10-CM

## 2018-01-02 DIAGNOSIS — I10 ESSENTIAL HYPERTENSION: Primary | ICD-10-CM

## 2018-01-02 PROCEDURE — 97140 MANUAL THERAPY 1/> REGIONS: CPT | Performed by: PHYSICAL THERAPIST

## 2018-01-02 PROCEDURE — 97110 THERAPEUTIC EXERCISES: CPT | Performed by: PHYSICAL THERAPIST

## 2018-01-02 PROCEDURE — 97530 THERAPEUTIC ACTIVITIES: CPT | Performed by: PHYSICAL THERAPIST

## 2018-01-02 PROCEDURE — 99213 OFFICE O/P EST LOW 20 MIN: CPT | Performed by: FAMILY MEDICINE

## 2018-01-02 PROCEDURE — 97014 ELECTRIC STIMULATION THERAPY: CPT | Performed by: PHYSICAL THERAPIST

## 2018-01-02 NOTE — PROGRESS NOTES
CC: F/u s/p right shoulder anterior, posterior labral repair  DOS 11/15/2017    Interval History: Patient returns to clinic stating pain is doing fairly well, has been using sling as instructed, denies any numbness or tingling over right arm. No fevers, chills, or sweats, and no drainage from incisions noted.     Exam:   Right shoulder- incisions well healed Tolerates passive FF 95, passive ER 20   Positive sensation all distributions right hand and proximal  lateral aspect arm, positive deltoid firing   Cap refill < 3 seconds, radial pulse 2+   Positive deltoid firing   Flex/extend fingers/thumb/wrist with 4+/5 strength, positive thumbs up, okay  sign, cross finger adduction and abduction against resistance     Impression: s/p right shoulder anterior, posterior labral repair     Plan:  1. Will begin PT at 4-6 wk vladimir, may discontinue sling at 6 weeks. Continue sling at all times for now.   2. F/u in 4 wks to re-assess motion  3. Avoid abduction and ER of shoulder, instructed in light pendulum exercises that may begin now

## 2018-01-02 NOTE — PROGRESS NOTES
"  Chief Complaint   Patient presents with   • Follow-up   • Hypertension       Subjective     Patient here for follow-up of elevated blood pressure.    He is not exercising and is not adherent to a low-salt diet.    Blood pressure is well controlled at home.   Cardiac symptoms: none.   Patient denies: chest pressure/discomfort, claudication, cough, dyspnea, exertional chest pressure/discomfort, fatigue, irregular heart beat and lower extremity edema.   Cardiovascular risk factors: hypertension, male gender and obesity (BMI >= 30 kg/m2).   Use of agents associated with hypertension: NSAIDS.   History of target organ damage: none.  Patient is taking prescribed hypertension medications as prescribed without side effects.  Reviewed his pre op labs      The following portions of the patient's history were reviewed and updated as appropriate: allergies, current medications, past family history, past medical history, past social history, past surgical history and problem list.    Review of Systems  still recovering from right shoulder labral tear repair. i son workers comp          Vitals:    01/02/18 1251   BP: 136/84   BP Location: Left arm   Patient Position: Sitting   Pulse: 89   Temp: 98 °F (36.7 °C)   SpO2: 97%   Weight: (!) 142 kg (312 lb 8 oz)   Height: 190.5 cm (75\")     Objective    Gen: alert, pleasant.  HEENT: PERRL, EOMI intact, lids ok, ear canals clear, TMs normal, throat clear, nostrils normal  Neck: no bruit, no enlarged thyroid  Lungs: CTA  Heart: RR no murmur  ABD: soft , + BS  Pulses: intact  Mood: stable     Assessment/Plan   Hypertension, normal blood pressure Evidence of target organ damage: none.    Murray was seen today for follow-up and hypertension.    Diagnoses and all orders for this visit:    Essential hypertension      Medication: no change.  Follow up: 6 months and as needed.    There are no Patient Instructions on file for this visit.  Medications Discontinued During This Encounter "   Medication Reason   • aspirin 81 MG tablet Therapy completed        Return in about 6 months (around 7/2/2018) for blood pressure.    Limit salt  Limit alcoholic drinks to 1 a day  Limit caffeine to 1-2 servings a day    Dr. Son Hwang MD  [place], [place]  [facility].

## 2018-01-03 ENCOUNTER — TELEPHONE (OUTPATIENT)
Dept: ORTHOPEDIC SURGERY | Facility: CLINIC | Age: 23
End: 2018-01-03

## 2018-01-03 ENCOUNTER — TREATMENT (OUTPATIENT)
Dept: PHYSICAL THERAPY | Facility: CLINIC | Age: 23
End: 2018-01-03

## 2018-01-03 DIAGNOSIS — Z98.890 STATUS POST LABRAL REPAIR OF SHOULDER: Primary | ICD-10-CM

## 2018-01-03 PROCEDURE — 97110 THERAPEUTIC EXERCISES: CPT | Performed by: PHYSICAL THERAPIST

## 2018-01-03 PROCEDURE — 97014 ELECTRIC STIMULATION THERAPY: CPT | Performed by: PHYSICAL THERAPIST

## 2018-01-03 PROCEDURE — 97140 MANUAL THERAPY 1/> REGIONS: CPT | Performed by: PHYSICAL THERAPIST

## 2018-01-03 NOTE — PROGRESS NOTES
Physical Therapy Daily Progress Note    Time In 9:36  Time Out 10:43    Visit # : 5  Murray Regalado reports: the shoulder isn't hurting and it feels better after doing the HEP.      Subjective     Objective   See Exercise, Manual, and Modality Logs for complete treatment.       Assessment & Plan     Assessment  Assessment details: Pt continues to respond to treatment with improving PROM and AAROM.  Pt c/o increased pain during the wall slides, but tolerable.  Will measure ROM next visit and plan to continue to see pt 3x/week for stretching, strengthening and modalities PRN per protocol.          Progress per Plan of Care           Manual Therapy:   15      mins  25548;  Therapeutic Exercise:   30      mins  81459;     Neuromuscular Logan:        mins  82326;    Therapeutic Activity:          mins  00129;     Gait Training:           mins  79698;     Ultrasound:          mins  37553;    Electrical Stimulation:    15     mins  40423 ( );  Dry Needling          mins self-pay    Timed Treatment:  45    mins   Total Treatment:     67   mins    Adele Carpio, PT  Physical Therapist

## 2018-01-03 NOTE — TELEPHONE ENCOUNTER
Patient called today stating that his right shoulder feels warm.  Says that it is not around his incision but a little  further down the arm.  Says that it may be slightly red in that area and denies any fever or chills.  He also noticed that area of his right arm is slightly larger than the other and I informed him that this may just be caused by some increased swelling.  Ice, rest, should he be concerned about anything else?

## 2018-01-03 NOTE — TELEPHONE ENCOUNTER
Notify us if he notes fevers, chills, sweats, increasing pain or issues with incision- can see him Fri AM if need be

## 2018-01-05 ENCOUNTER — TREATMENT (OUTPATIENT)
Dept: PHYSICAL THERAPY | Facility: CLINIC | Age: 23
End: 2018-01-05

## 2018-01-05 DIAGNOSIS — Z98.890 STATUS POST LABRAL REPAIR OF SHOULDER: Primary | ICD-10-CM

## 2018-01-05 PROCEDURE — 97110 THERAPEUTIC EXERCISES: CPT | Performed by: PHYSICAL THERAPIST

## 2018-01-05 PROCEDURE — 97014 ELECTRIC STIMULATION THERAPY: CPT | Performed by: PHYSICAL THERAPIST

## 2018-01-05 PROCEDURE — 97140 MANUAL THERAPY 1/> REGIONS: CPT | Performed by: PHYSICAL THERAPIST

## 2018-01-05 NOTE — PROGRESS NOTES
Physical Therapy Daily Progress Note    Time In 9:00  Time Out 10:23    Visit # : 6  Murray Regalado reports: his shoulder is feeling good with less soreness.  Fighting allergies today and did not sleep well.      Subjective     Objective       Passive Range of Motion     Right Shoulder   Flexion: 119 degrees   Abduction: 99 degrees   External rotation 45°: 25 degrees   Internal rotation 45°: 60 degrees     Additional Passive Range of Motion Details  ABD measured in scaption plane     See Exercise, Manual, and Modality Logs for complete treatment.       Assessment & Plan     Assessment  Assessment details: Pt continues to increase in PROM all planes.  Pt is doing well with AAROM stretches, but still states the wall slides are the most difficult and painful.  Will continue to see pt 3x/week next week and progress stretches per protocol.  Pt goes to MD next week.            Progress per Plan of Care           Manual Therapy:    15     mins  16174;  Therapeutic Exercise:    30     mins  12399;     Neuromuscular Logan:        mins  23214;    Therapeutic Activity:          mins  23620;     Gait Training:           mins  32440;     Ultrasound:          mins  17735;    Electrical Stimulation:    15     mins  42688 ( );  Dry Needling          mins self-pay    Timed Treatment:   45   mins   Total Treatment:     83   mins    Adele Carpio, PT  Physical Therapist

## 2018-01-09 ENCOUNTER — TREATMENT (OUTPATIENT)
Dept: PHYSICAL THERAPY | Facility: CLINIC | Age: 23
End: 2018-01-09

## 2018-01-09 DIAGNOSIS — Z98.890 STATUS POST LABRAL REPAIR OF SHOULDER: Primary | ICD-10-CM

## 2018-01-09 PROCEDURE — 97014 ELECTRIC STIMULATION THERAPY: CPT | Performed by: PHYSICAL THERAPIST

## 2018-01-09 PROCEDURE — 97140 MANUAL THERAPY 1/> REGIONS: CPT | Performed by: PHYSICAL THERAPIST

## 2018-01-09 PROCEDURE — 97110 THERAPEUTIC EXERCISES: CPT | Performed by: PHYSICAL THERAPIST

## 2018-01-09 NOTE — PROGRESS NOTES
"Physical Therapy Daily Progress Note    Time In 9:02  Time Out 10:13    Visit # : 7  Murray Regalado reports: that the shoulder is doing pretty well.  Found his pulley at home, so started using it this weekend and \"I think it has helped it not get so tight.\"    Subjective     Objective   See Exercise, Manual, and Modality Logs for complete treatment.       Assessment & Plan     Assessment  Assessment details: Pt continues to increase in PROM all planes.  Pt is doing well with AAROM stretches, but still states the wall slides are the most difficult and painful.  Will continue to see pt 3x/week next week and progress stretches per protocol.  Pt goes to MD Friday.  Measure PROM and AAROM next visit.             Progress per Plan of Care           Manual Therapy:    15     mins  73140;  Therapeutic Exercise:    30     mins  43878;     Neuromuscular Logan:        mins  00245;    Therapeutic Activity:          mins  04260;     Gait Training:           mins  32695;     Ultrasound:          mins  11532;    Electrical Stimulation:    15     mins  44151 ( );  Dry Needling          mins self-pay    Timed Treatment:  45  mins   Total Treatment:     71   mins    Adele Carpio, PT  Physical Therapist  "

## 2018-01-10 ENCOUNTER — TREATMENT (OUTPATIENT)
Dept: PHYSICAL THERAPY | Facility: CLINIC | Age: 23
End: 2018-01-10

## 2018-01-10 DIAGNOSIS — Z98.890 STATUS POST LABRAL REPAIR OF SHOULDER: Primary | ICD-10-CM

## 2018-01-10 PROCEDURE — 97014 ELECTRIC STIMULATION THERAPY: CPT | Performed by: PHYSICAL THERAPIST

## 2018-01-10 PROCEDURE — 97110 THERAPEUTIC EXERCISES: CPT | Performed by: PHYSICAL THERAPIST

## 2018-01-10 PROCEDURE — 97140 MANUAL THERAPY 1/> REGIONS: CPT | Performed by: PHYSICAL THERAPIST

## 2018-01-10 NOTE — PROGRESS NOTES
Physical Therapy Progress Note  1/10/2018      Re: Murray Regalado    Time In 9:34  Time Out 10:53  ________________________________________________________________    Mr. Murray Regalado, has attended 8/8 PT sessions.  Treatment has consisted of: PROM per protocol, AAROM exercises, HEP, and IFC and cold pack.       S: Mr. Murray Regalado states: his shoulder was very sore yesterday afternoon, but much better this am.  Pt rates his shoulder pain a 6-7/10 with activity or HEP, but with normal daily activity 1-2/10.       Subjective     Objective       Passive Range of Motion   Left Shoulder   Flexion: 147 degrees   Abduction: 170 degrees   External rotation 45°: 30 degrees   Internal rotation 45°: 50 degrees     Additional Passive Range of Motion Details  ABD is measured in scaption      See Exercise, Manual, and Modality Logs for complete treatment.       Assessment & Plan     Assessment  Assessment details: Pt is progressing well with improving PROM and AAROM in all planes.  Still has tightness in ER and flexion, but slowly improving.  Pt tolerated standing cane exercises fairly well, but c/o some discomfort and weakness.  Will continue seeing pt 3x/week and progressing exercises per protocol, but please advise.          Progress per Plan of Care           Manual Therapy:    15     mins  08507;  Therapeutic Exercise:    35     mins  81697;     Neuromuscular Logan:        mins  66806;    Therapeutic Activity:          mins  30396;     Gait Training:           mins  18964;     Ultrasound:          mins  67200;    Electrical Stimulation:    15     mins  03965 ( );  Dry Needling          mins self-pay    Timed Treatment:  50    mins   Total Treatment:     79   mins    dAele Carpio, PT  Physical Therapist

## 2018-01-10 NOTE — PROGRESS NOTES
Physical Therapy Daily Progress Note    Time In 9:34  Time Out ***    Visit # : 8  Murray Regalado reports:    Subjective     Objective   See Exercise, Manual, and Modality Logs for complete treatment.       Assessment/Plan    {AMB PT PLAN (SOAP Note):69725}           Manual Therapy:    ***     mins  80088;  Therapeutic Exercise:    ***     mins  45262;     Neuromuscular Logan:    ***    mins  13947;    Therapeutic Activity:     ***     mins  31183;     Gait Training:      ***     mins  11029;     Ultrasound:     ***     mins  96162;    Electrical Stimulation:    ***     mins  73710 ( );  Dry Needling     ***     mins self-pay    Timed Treatment:   ***   mins   Total Treatment:     ***   mins    Adele Carpio, PT  Physical Therapist

## 2018-01-12 ENCOUNTER — TREATMENT (OUTPATIENT)
Dept: PHYSICAL THERAPY | Facility: CLINIC | Age: 23
End: 2018-01-12

## 2018-01-12 ENCOUNTER — OFFICE VISIT (OUTPATIENT)
Dept: ORTHOPEDIC SURGERY | Facility: CLINIC | Age: 23
End: 2018-01-12

## 2018-01-12 DIAGNOSIS — M75.41 IMPINGEMENT SYNDROME OF RIGHT SHOULDER: ICD-10-CM

## 2018-01-12 DIAGNOSIS — S43.431D TEAR OF RIGHT GLENOID LABRUM, SUBSEQUENT ENCOUNTER: ICD-10-CM

## 2018-01-12 DIAGNOSIS — Z98.890 STATUS POST LABRAL REPAIR OF SHOULDER: Primary | ICD-10-CM

## 2018-01-12 DIAGNOSIS — Z98.890 STATUS POST ARTHROSCOPY OF SHOULDER: Primary | ICD-10-CM

## 2018-01-12 PROCEDURE — 97014 ELECTRIC STIMULATION THERAPY: CPT | Performed by: PHYSICAL THERAPIST

## 2018-01-12 PROCEDURE — 97110 THERAPEUTIC EXERCISES: CPT | Performed by: PHYSICAL THERAPIST

## 2018-01-12 PROCEDURE — 97140 MANUAL THERAPY 1/> REGIONS: CPT | Performed by: PHYSICAL THERAPIST

## 2018-01-12 PROCEDURE — 99024 POSTOP FOLLOW-UP VISIT: CPT | Performed by: ORTHOPAEDIC SURGERY

## 2018-01-12 RX ORDER — METHYLPREDNISOLONE 4 MG/1
TABLET ORAL
Qty: 1 TABLET | Refills: 0 | Status: SHIPPED | OUTPATIENT
Start: 2018-01-12 | End: 2018-02-07

## 2018-01-12 NOTE — PROGRESS NOTES
"Physical Therapy Daily Progress Note    Time In 13:00  Time Out 14:10    Visit # : 9  Murray Regalado reports: his shoulder feels \"super tight.\"  Pt rates his right shoulder pain with motion 6-7/10.  Saw MD this morning and started on prednisone and sees MD again in 4 weeks.       Subjective     Objective   See Exercise, Manual, and Modality Logs for complete treatment.       Assessment & Plan     Assessment  Assessment details: Pt tolerated treatment well and able to perform new strengthening exercises without pain.  Pt continues to show improvements in PROM all planes.  Will continue to see pt 3x/week for stretching, strengthening, and modalities PRN for pain relief per protocol.          Progress per Plan of Care           Manual Therapy:    15     mins  09646;  Therapeutic Exercise:   35      mins  26024;     Neuromuscular Logan:        mins  73553;    Therapeutic Activity:          mins  56965;     Gait Training:           mins  64467;     Ultrasound:          mins  10695;    Electrical Stimulation:   15     mins  27923 ( );  Dry Needling          mins self-pay    Timed Treatment:  50    mins   Total Treatment:     70   mins    Adele Carpio, PT  Physical Therapist  "

## 2018-01-16 ENCOUNTER — DOCUMENTATION (OUTPATIENT)
Dept: PHYSICAL THERAPY | Facility: HOSPITAL | Age: 23
End: 2018-01-16

## 2018-01-16 ENCOUNTER — TREATMENT (OUTPATIENT)
Dept: PHYSICAL THERAPY | Facility: CLINIC | Age: 23
End: 2018-01-16

## 2018-01-16 DIAGNOSIS — Z98.890 STATUS POST LABRAL REPAIR OF SHOULDER: Primary | ICD-10-CM

## 2018-01-16 PROCEDURE — 97110 THERAPEUTIC EXERCISES: CPT | Performed by: PHYSICAL THERAPIST

## 2018-01-16 PROCEDURE — 97014 ELECTRIC STIMULATION THERAPY: CPT | Performed by: PHYSICAL THERAPIST

## 2018-01-16 PROCEDURE — 97140 MANUAL THERAPY 1/> REGIONS: CPT | Performed by: PHYSICAL THERAPIST

## 2018-01-16 NOTE — THERAPY DISCHARGE NOTE
Outpatient Physical Therapy Ortho Initial Evaluation/Discharge       Patient Name: Murray Regalado  : 1995  MRN: [id number]  Today's Date: 2018      Visit Date: 2018    Patient Active Problem List   Diagnosis   • Essential hypertension   • Routine adult health maintenance   • Severely overweight   • Impingement syndrome of right shoulder   • Tear of right glenoid labrum   • Status post arthroscopy of shoulder        Past Medical History:   Diagnosis Date   • H/O Bell's palsy     , RESOLVED WITH STEROIDS   • Hypertension    • Lactose intolerance    • Right shoulder pain     SCHEDULED FOR SURGERY   • Shoulder injury     right        Past Surgical History:   Procedure Laterality Date   • SHOULDER ARTHROSCOPY Right 11/15/2017    Procedure: SHOULDER ARTHROSCOPY, labral repair, and all associated procedures;  Surgeon: Negro Martinez MD;  Location: [facility];  Service:    • TONSILLECTOMY     • TOOTH EXTRACTION  2017         Visit Dx:   No diagnosis found.                                    PT OP Goals       18 1300       PT Short Term Goals    STG Date to Achieve 10/16/17  -CC     STG 1 Pt able to tolerate and perfrom HEP as instructed  -CC     STG 1 Progress Met  -CC     STG 2 Pt able to show AROM R shoulder in FF/ABD at least 140-150 degrees  -CC     STG 2 Progress Not Met  -CC     STG 3 Pt able to tolerate and use RUE to eat- drink-dress with RUE  -CC     STG 3 Progress Partially Met  -CC     STG 4 Pt reports disturbed sleep and using only 1-2 pillows to sleep in bed  -CC     STG 4 Progress Not Met  -CC     STG 5 Pt reports max pain less/equal 4-5/10  -CC     STG 5 Progress Not Met  -CC     Long Term Goals    LTG Date to Achieve 17  -CC     LTG 1 Pt has full Arom without painful arc R  shoulder  -CC     LTG 1 Progress Not Met  -CC     LTG 2 Pt shows 4/5 to 4/5+ in R shoulder  -CC     LTG 2 Progress Not Met  -CC     LTG 3 Pt reports intermittent sx with max sx 2-/10  -CC      LTG 3 Progress Not Met  -CC     LTG 4 Pt able to RTW  -CC     LTG 4 Progress Not Met  -CC       User Key  (r) = Recorded By, (t) = Taken By, (c) = Cosigned By    Initials Name Provider Type    CC Gayatri Peters, PT Physical Therapist                                                                                                                                     User Key  (r) = Recorded By, (t) = Taken By, (c) = Cosigned By    Initials Name Provider Type                             01/16/18 0900          Initials Name Provider Type                                     Time Calculation:                  OP PT Discharge Summary  Date of Discharge: 01/16/18 (Delayed discharge- Pt seen 9-27-17 - 10-12-17 x 4 visits. Had f/u [facility] with continued compliants R shoulder pain and did not resume PT sessions)  Reason for Discharge: Lack of progress  Outcomes Achieved: Unable to make functional progress toward goals at this time  Discharge Destination: Unknown        Gayatri Peters, PT  1/16/2018

## 2018-01-16 NOTE — PROGRESS NOTES
Physical Therapy Daily Progress Note    Time In 09:09   Time Out 10:17    Visit # : 10  Murray Regalado reports: his shoulder was hurting a lot over the weekend and not sure why.  States he could not get it to loosen up with the stretches.      Subjective     Objective   See Exercise, Manual, and Modality Logs for complete treatment.       Assessment & Plan     Assessment  Assessment details: Pt continues to show improvements in PROM and AAROM all planes.  Will continue to see pt 3x/week for stretching, strengthening, and modalities PRN for pain relief per protocol.          Progress per Plan of Care           Manual Therapy:   15      mins  14529;  Therapeutic Exercise:    35     mins  70617;     Neuromuscular Logan:        mins  48109;    Therapeutic Activity:          mins  15374;     Gait Training:           mins  09261;     Ultrasound:          mins  14742;    Electrical Stimulation:    15     mins  71706 ( );  Dry Needling          mins self-pay    Timed Treatment:   50   mins   Total Treatment:     68   mins    Aedle Carpio, PT  Physical Therapist

## 2018-01-17 ENCOUNTER — TREATMENT (OUTPATIENT)
Dept: PHYSICAL THERAPY | Facility: CLINIC | Age: 23
End: 2018-01-17

## 2018-01-17 DIAGNOSIS — Z98.890 STATUS POST LABRAL REPAIR OF SHOULDER: Primary | ICD-10-CM

## 2018-01-17 PROCEDURE — 97014 ELECTRIC STIMULATION THERAPY: CPT | Performed by: PHYSICAL THERAPIST

## 2018-01-17 PROCEDURE — 97110 THERAPEUTIC EXERCISES: CPT | Performed by: PHYSICAL THERAPIST

## 2018-01-17 PROCEDURE — 97140 MANUAL THERAPY 1/> REGIONS: CPT | Performed by: PHYSICAL THERAPIST

## 2018-01-17 NOTE — PROGRESS NOTES
Physical Therapy Daily Progress Note    Time In 9:28  Time Out 10:33    Visit # : 11  Murray Regalado reports his shoulder is feeling better and denies having any increased pain this morning.      Subjective     Objective   See Exercise, Manual, and Modality Logs for complete treatment.       Assessment & Plan     Assessment  Assessment details: Pt continues to show improvements in PROM and AAROM all planes.  Will continue to see pt 3x/week for stretching, strengthening, and modalities PRN for pain relief per protocol.  Will measure ROM next visit.          Progress per Plan of Care           Manual Therapy:    15     mins  00341;  Therapeutic Exercise:    30     mins  11360;     Neuromuscular Logan:        mins  78666;    Therapeutic Activity:          mins  94345;     Gait Training:           mins  02083;     Ultrasound:          mins  24716;    Electrical Stimulation:    15     mins  85518 ( );  Dry Needling          mins self-pay    Timed Treatment:  45    mins   Total Treatment:     65   mins    Adele Carpio, PT  Physical Therapist

## 2018-01-19 ENCOUNTER — TREATMENT (OUTPATIENT)
Dept: PHYSICAL THERAPY | Facility: CLINIC | Age: 23
End: 2018-01-19

## 2018-01-19 DIAGNOSIS — Z98.890 STATUS POST LABRAL REPAIR OF SHOULDER: Primary | ICD-10-CM

## 2018-01-19 PROCEDURE — 97014 ELECTRIC STIMULATION THERAPY: CPT | Performed by: PHYSICAL THERAPIST

## 2018-01-19 PROCEDURE — 97140 MANUAL THERAPY 1/> REGIONS: CPT | Performed by: PHYSICAL THERAPIST

## 2018-01-19 PROCEDURE — 97110 THERAPEUTIC EXERCISES: CPT | Performed by: PHYSICAL THERAPIST

## 2018-01-19 PROCEDURE — 97530 THERAPEUTIC ACTIVITIES: CPT | Performed by: PHYSICAL THERAPIST

## 2018-01-19 NOTE — PROGRESS NOTES
Physical Therapy Daily Progress Note    Time In 8:32  Time Out 10:04    Visit # : 12  Murray Regalado reports: his shoulder has been feeling pretty good.  Still has trouble stretching out to the side and over his head, but it is improving.  Pt states his right shoulder pain with normal daily activity is a 2/10     Subjective     Objective       Active Range of Motion     Additional Active Range of Motion Details  Right shoulder AROM in standing Flex 140 degrees and ABD in scaption 160 degrees     Passive Range of Motion     Right Shoulder   Flexion: 153 degrees   Abduction: 180 degrees   External rotation 45°: 48 degrees   Internal rotation 45°: 70 degrees      See Exercise, Manual, and Modality Logs for complete treatment.       Assessment & Plan     Assessment  Assessment details: Pt continues to show improvements in ROM and pain.  Pt tolerated all new stretches and exercises without pain, but c/o muscle fatigue after treatment.  Will continue to see pt 3x/week pushing ROM and strength per protocol.          Progress per Plan of Care           Manual Therapy:    15     mins  70188;  Therapeutic Exercise:    20     mins  20729;     Neuromuscular Logan:        mins  96891;    Therapeutic Activity:     10     mins  47579;     Gait Training:           mins  88617;     Ultrasound:          mins  29522;    Electrical Stimulation:    15     mins  42056 ( );  Dry Needling          mins self-pay    Timed Treatment:   45   mins   Total Treatment:     92   mins    Adele Carpio, PT  Physical Therapist

## 2018-01-23 ENCOUNTER — TREATMENT (OUTPATIENT)
Dept: PHYSICAL THERAPY | Facility: CLINIC | Age: 23
End: 2018-01-23

## 2018-01-23 DIAGNOSIS — Z98.890 STATUS POST LABRAL REPAIR OF SHOULDER: Primary | ICD-10-CM

## 2018-01-23 PROCEDURE — 97014 ELECTRIC STIMULATION THERAPY: CPT | Performed by: PHYSICAL THERAPIST

## 2018-01-23 PROCEDURE — 97530 THERAPEUTIC ACTIVITIES: CPT | Performed by: PHYSICAL THERAPIST

## 2018-01-23 PROCEDURE — 97110 THERAPEUTIC EXERCISES: CPT | Performed by: PHYSICAL THERAPIST

## 2018-01-23 NOTE — PROGRESS NOTES
Physical Therapy Daily Progress Note        Visit # : 13  Murray Regalado reports: I was really stiff for a couple of days after my last PT session - feeling better today     Subjective     Objective       Active Range of Motion     Right Shoulder   External rotation 45°: 40 degrees with pain  Internal rotation 45°: 75 degrees     Additional Active Range of Motion Details  Firm end-feel greatest into ER     See Exercise, Manual, and Modality Logs for complete treatment.       Assessment & Plan     Assessment  Assessment details: Patient presents with painful end-range PROM and firm end-feel greatest with ER. Tolerated all strength and stabilization progressions well today. Deficits persist in decreased endurance and limited tolerance to functional activity - I.e putting on coat, reaching for wallet in back pocket. Cont PT 3x per week for strength and stability - next visit attempt sleeper stretch and/or prone rows if tolerated.         Progress strengthening /stabilization /functional activity           Manual Therapy:    12     mins  60567;  Therapeutic Exercise:    23     mins  81105;     Neuromuscular Logan:        mins  13244;    Therapeutic Activity:     10     mins  64471;     Gait Training:          mins  45399;     Ultrasound:          mins  69704;    Electrical Stimulation:    15     mins  96102 ( );  Dry Needling          mins self-pay    Timed Treatment:   45   mins   Total Treatment:     60   mins    Juliana Fernández, PT  Physical Therapist  [place] License # [id number]

## 2018-01-24 ENCOUNTER — TREATMENT (OUTPATIENT)
Dept: PHYSICAL THERAPY | Facility: CLINIC | Age: 23
End: 2018-01-24

## 2018-01-24 DIAGNOSIS — Z98.890 STATUS POST LABRAL REPAIR OF SHOULDER: Primary | ICD-10-CM

## 2018-01-24 PROCEDURE — 97140 MANUAL THERAPY 1/> REGIONS: CPT | Performed by: PHYSICAL THERAPIST

## 2018-01-24 PROCEDURE — 97530 THERAPEUTIC ACTIVITIES: CPT | Performed by: PHYSICAL THERAPIST

## 2018-01-24 PROCEDURE — 97110 THERAPEUTIC EXERCISES: CPT | Performed by: PHYSICAL THERAPIST

## 2018-01-24 NOTE — PROGRESS NOTES
Physical Therapy Daily Progress Note    Time In 08:30  Time Out 9:55    Visit # : 14  Murray Regalado reports: his shoulder is doing better today than yesterday.    Subjective     Objective   See Exercise, Manual, and Modality Logs for complete treatment.       Assessment & Plan     Assessment  Assessment details: Pt continues to show progress in ROM and strength.  Pt tolerated new strengthening and stretching exercises well today.  Still needs scapular and RC strengthening for improved functional activities.  Will continue to see 3x/week for stretching, strengthening, and modalities PRN per protocol.          Progress per Plan of Care           Manual Therapy:    15     mins  80379;  Therapeutic Exercise:     25    mins  49409;     Neuromuscular Logan:        mins  77999;    Therapeutic Activity:    15      mins  21124;     Gait Training:           mins  67231;     Ultrasound:          mins  87802;    Electrical Stimulation:    15     mins  15190 ( );  Dry Needling          mins self-pay    Timed Treatment:   55   mins   Total Treatment:     85   mins    Adele Carpio, PT  Physical Therapist

## 2018-01-25 NOTE — PROGRESS NOTES
CC: F/u s/p right shoulder anterior, posterior labral repair  DOS 11/15/2017    Interval History: Patient returns to clinic stating pain is doing very well, has discontinued sling approximately 2 weeks ago, denies any numbness or tingling over right arm. No fevers, chills, or sweats, and no drainage from incisions noted.  Working with physical therapy on range of motion.    Exam:   Right shoulder- incisions well healed Tolerates passive , passive ER 40.  Active forward flexion on 130°, active external rotation 25°.    Positive sensation all distributions right hand and proximal  lateral aspect arm, positive deltoid firing   Cap refill < 3 seconds, radial pulse 2+   Positive deltoid firing   Flex/extend fingers/thumb/wrist with 4+/5 strength, positive thumbs up, okay  sign, cross finger adduction and abduction against resistance     Impression: s/p right shoulder anterior, posterior labral repair     Plan:  1.  Continue PT for advancing range of motion and strengthening.  Medrol Dosepak given to help with loosening soft tissues and advancing motion  2. F/u in 4 wks to re-assess motion  3. Avoid abduction and ER of shoulder, instructed in light pendulum exercises that may begin now  4.  May return to work at this point time with light duty, no lifting pushing or pulling with right upper extremity.

## 2018-01-26 ENCOUNTER — TREATMENT (OUTPATIENT)
Dept: PHYSICAL THERAPY | Facility: CLINIC | Age: 23
End: 2018-01-26

## 2018-01-26 DIAGNOSIS — Z98.890 STATUS POST ARTHROSCOPY OF SHOULDER: Primary | ICD-10-CM

## 2018-01-26 DIAGNOSIS — S43.431D TEAR OF RIGHT GLENOID LABRUM, SUBSEQUENT ENCOUNTER: ICD-10-CM

## 2018-01-26 DIAGNOSIS — Z98.890 STATUS POST LABRAL REPAIR OF SHOULDER: Primary | ICD-10-CM

## 2018-01-26 PROCEDURE — 97530 THERAPEUTIC ACTIVITIES: CPT | Performed by: PHYSICAL THERAPIST

## 2018-01-26 PROCEDURE — 97140 MANUAL THERAPY 1/> REGIONS: CPT | Performed by: PHYSICAL THERAPIST

## 2018-01-26 PROCEDURE — 97110 THERAPEUTIC EXERCISES: CPT | Performed by: PHYSICAL THERAPIST

## 2018-01-26 NOTE — PROGRESS NOTES
Physical Therapy Daily Progress Note    Time In 08:32   Time Out 10:15    Visit # : 15  Murray Regalado reports: feeling pretty good today. Pt states his shoulder feels a little tight.     Subjective     Objective   See Exercise, Manual, and Modality Logs for complete treatment.       Assessment & Plan     Assessment  Assessment details: Pt continues to show progress in ROM and strength.  Pt with more tightness and pain during flexion and ER PROM at end ranges, but still progressing towards WNL.  Pt tolerated new strengthening exercises well today, but had more difficulty with D2 PNF pattern.  Still needs scapular and RC strengthening for improved functional activities.  Will continue to see 3x/week for stretching, strengthening, and modalities PRN per protocol.          Progress per Plan of Care           Manual Therapy:   15      mins  77975;  Therapeutic Exercise:   30      mins  62883;     Neuromuscular Logan:        mins  92870;    Therapeutic Activity:    15     mins  81018;     Gait Training:           mins  81306;     Ultrasound:          mins  25211;    Electrical Stimulation:    15     mins  34636 ( );  Dry Needling          mins self-pay    Timed Treatment:  60    mins   Total Treatment:     103   mins    Adele Carpio, PT  Physical Therapist

## 2018-01-30 ENCOUNTER — TREATMENT (OUTPATIENT)
Dept: PHYSICAL THERAPY | Facility: CLINIC | Age: 23
End: 2018-01-30

## 2018-01-30 DIAGNOSIS — Z98.890 STATUS POST LABRAL REPAIR OF SHOULDER: Primary | ICD-10-CM

## 2018-01-30 PROCEDURE — 97530 THERAPEUTIC ACTIVITIES: CPT | Performed by: PHYSICAL THERAPIST

## 2018-01-30 PROCEDURE — 97110 THERAPEUTIC EXERCISES: CPT | Performed by: PHYSICAL THERAPIST

## 2018-01-30 PROCEDURE — 97140 MANUAL THERAPY 1/> REGIONS: CPT | Performed by: PHYSICAL THERAPIST

## 2018-01-30 NOTE — PROGRESS NOTES
Physical Therapy Daily Progress Note    Time In 9:09  Time Out 10:45    Visit # : 16  Murray Regalado reports: his shoulder is doing good today and over the weekend.  Denies having any increased tightness.      Subjective     Objective   See Exercise, Manual, and Modality Logs for complete treatment.       Assessment & Plan     Assessment  Assessment details: Pt continues to show progress in ROM and strength.  Pt tolerated most of the increases in strengthening exercises, but fatigued and had increased pain with body blade.   Will continue to see 3x/week for stretching, strengthening, and modalities PRN per protocol.          Progress per Plan of Care           Manual Therapy:   15      mins  44609;  Therapeutic Exercise:   30      mins  01839;     Neuromuscular Logan:        mins  23181;    Therapeutic Activity:   15       mins  27800;     Gait Training:           mins  54455;     Ultrasound:          mins  86799;    Electrical Stimulation:   15      mins  80523 ( );  Dry Needling          mins self-pay    Timed Treatment:  60    mins   Total Treatment:     96   mins    Adele Carpio, PT  Physical Therapist

## 2018-01-31 ENCOUNTER — TREATMENT (OUTPATIENT)
Dept: PHYSICAL THERAPY | Facility: CLINIC | Age: 23
End: 2018-01-31

## 2018-01-31 DIAGNOSIS — Z98.890 STATUS POST LABRAL REPAIR OF SHOULDER: Primary | ICD-10-CM

## 2018-01-31 PROCEDURE — 97530 THERAPEUTIC ACTIVITIES: CPT | Performed by: PHYSICAL THERAPIST

## 2018-01-31 PROCEDURE — 97110 THERAPEUTIC EXERCISES: CPT | Performed by: PHYSICAL THERAPIST

## 2018-01-31 PROCEDURE — 97140 MANUAL THERAPY 1/> REGIONS: CPT | Performed by: PHYSICAL THERAPIST

## 2018-01-31 NOTE — PROGRESS NOTES
Physical Therapy Daily Progress Note    Time In 08:34   Time Out 10:08    Visit # : 17  Murray Regalado reports: shoulder feels ok.   Hurt a little after treatment.      Subjective     Objective   See Exercise, Manual, and Modality Logs for complete treatment.       Assessment & Plan     Assessment  Assessment details: Pt continues to show progress in ROM and strength.  Pt tolerated most of the increases in strengthening exercises, but fatigues easily with body blade.   Will continue to see 3x/week for stretching, strengthening, and modalities PRN per protocol.          Progress per Plan of Care           Manual Therapy:    15     mins  06365;  Therapeutic Exercise:    30     mins  54867;     Neuromuscular Logan:        mins  42913;    Therapeutic Activity:    15      mins  69552;     Gait Training:           mins  20587;     Ultrasound:          mins  34735;    Electrical Stimulation:    15     mins  52420 ( );  Dry Needling          mins self-pay    Timed Treatment:  60    mins   Total Treatment:     94   mins    Adele Carpio, PT  Physical Therapist

## 2018-02-02 ENCOUNTER — TREATMENT (OUTPATIENT)
Dept: PHYSICAL THERAPY | Facility: CLINIC | Age: 23
End: 2018-02-02

## 2018-02-02 DIAGNOSIS — Z98.890 STATUS POST LABRAL REPAIR OF SHOULDER: Primary | ICD-10-CM

## 2018-02-02 PROCEDURE — 97530 THERAPEUTIC ACTIVITIES: CPT | Performed by: PHYSICAL THERAPIST

## 2018-02-02 PROCEDURE — 97110 THERAPEUTIC EXERCISES: CPT | Performed by: PHYSICAL THERAPIST

## 2018-02-02 PROCEDURE — 97140 MANUAL THERAPY 1/> REGIONS: CPT | Performed by: PHYSICAL THERAPIST

## 2018-02-02 NOTE — PROGRESS NOTES
"Physical Therapy Progress Note  2/2/2018  MIKALA Felix    Re: Murray Regalado    Time In 8:31  Time Out 10:16  ________________________________________________________________    Mr. Murray Regalado, has attended 18/18 PT sessions.  Treatment has consisted of: PROM, G-H joint mobs, AAROM, AROM, RROM per protocol, pt has been instructed in a HEP, and pt ends treatment with IFC and cold pack.        S: Mr. Murray Regalado states: his shoulder has been feeling pretty good and rates pain a 0-1/10 with daily activities.  Pt states his shoulder pain today is a 4-5/10 with stretching and c/o \"pinching\" pain in the posterior shoulder joint.      Subjective     Objective       Active Range of Motion     Right Shoulder   Flexion: 140 degrees   Abduction: 146 degrees     Passive Range of Motion     Right Shoulder   Flexion: 152 degrees   Abduction: 170 degrees   External rotation 45°: 41 degrees   Internal rotation 45°: 74 degrees     Strength/Myotome Testing     Right Shoulder     Planes of Motion   Flexion: 4+   Abduction: 4+   External rotation at 0°: 4+   Internal rotation at 0°: 5     Isolated Muscles   Biceps: 5   Supraspinatus: 4+   Triceps: 5      See Exercise, Manual, and Modality Logs for complete treatment.       Assessment & Plan     Assessment  Assessment details: Pt is progressing well with treatment, but today pt's PROM is more limited with firm end feels in flex and ER.  Pt is tolerating all strengthening exercises well.  Discussed doing more stretching exercises to improve pt's AROM in flex and ER with wall and table stretches.  Will continue pushing ROM in flex and ER with strengthening 3x/week.            Progress per Plan of Care           Manual Therapy:    15     mins  96235;  Therapeutic Exercise:   30      mins  31624;     Neuromuscular Logan:        mins  42138;    Therapeutic Activity:    15      mins  07749;     Gait Training:           mins  10344;     Ultrasound:          mins  29711;  "   Electrical Stimulation:    15     mins  57930 ( );  Dry Needling          mins self-pay    Timed Treatment:   60   mins   Total Treatment:     105   mins    Adele Carpio, PT  Physical Therapist

## 2018-02-02 NOTE — PROGRESS NOTES
Physical Therapy Daily Progress Note    Time In 08:31   Time Out 10:16    Visit # : 18  Murray Regalado reports: shoulder feels good, doesn't really hurt.  Didn't have much pain yesterday.  Been feeling pretty good    Subjective     Objective   See Exercise, Manual, and Modality Logs for complete treatment.       Assessment/Plan    {AMB PT PLAN (SOAP Note):27541}           Manual Therapy:    ***     mins  30146;  Therapeutic Exercise:    ***     mins  21299;     Neuromuscular Logan:    ***    mins  02711;    Therapeutic Activity:     ***     mins  20334;     Gait Training:      ***     mins  97924;     Ultrasound:     ***     mins  57881;    Electrical Stimulation:    ***     mins  09943 ( );  Dry Needling     ***     mins self-pay    Timed Treatment:   ***   mins   Total Treatment:     ***   mins    Adele Carpio, PT  Physical Therapist

## 2018-02-06 ENCOUNTER — TREATMENT (OUTPATIENT)
Dept: PHYSICAL THERAPY | Facility: CLINIC | Age: 23
End: 2018-02-06

## 2018-02-06 ENCOUNTER — OFFICE VISIT (OUTPATIENT)
Dept: ORTHOPEDIC SURGERY | Facility: CLINIC | Age: 23
End: 2018-02-06

## 2018-02-06 DIAGNOSIS — Z98.890 STATUS POST LABRAL REPAIR OF SHOULDER: Primary | ICD-10-CM

## 2018-02-06 DIAGNOSIS — Z98.890 STATUS POST ARTHROSCOPY OF SHOULDER: Primary | ICD-10-CM

## 2018-02-06 PROCEDURE — 97140 MANUAL THERAPY 1/> REGIONS: CPT | Performed by: PHYSICAL THERAPIST

## 2018-02-06 PROCEDURE — 99024 POSTOP FOLLOW-UP VISIT: CPT | Performed by: ORTHOPAEDIC SURGERY

## 2018-02-06 PROCEDURE — 97110 THERAPEUTIC EXERCISES: CPT | Performed by: PHYSICAL THERAPIST

## 2018-02-06 PROCEDURE — 97530 THERAPEUTIC ACTIVITIES: CPT | Performed by: PHYSICAL THERAPIST

## 2018-02-06 RX ORDER — PREDNISONE 10 MG/1
TABLET ORAL
Qty: 40 TABLET | Refills: 0 | Status: SHIPPED | OUTPATIENT
Start: 2018-02-06 | End: 2018-02-07

## 2018-02-06 NOTE — PROGRESS NOTES
CC: F/u s/p right shoulder anterior, posterior labral repair  DOS 11/15/2017    Interval History: Patient returns to clinic stating pain is doing very well, denies any numbness or tingling over right arm. No fevers, chills, or sweats, and no drainage from incisions noted.  Working with physical therapy on range of motion- has noted some pain with PT when working on extremes of motion and weighted FF greater than 120 degrees.     Exam:   Right shoulder- incisions well healed Tolerates passive  , passive ER 35.  Active forward flexion 155°, active external rotation 35°.    Positive sensation all distributions right hand and proximal  lateral aspect arm, positive deltoid firing   Cap refill < 3 seconds, radial pulse 2+   Positive deltoid firing   Flex/extend fingers/thumb/wrist with 4+/5 strength, positive thumbs up, okay sign, cross finger adduction and abduction against resistance     Impression: s/p right shoulder anterior, posterior labral repair     Plan:  1.  Continue PT for advancing range of motion and strengthening.  Prednisone taper given to help with loosening soft tissues and advancing motion.  If still having issues with motion at follow-up visit may consider intra-articular glenohumeral injection.  2. F/u in 3 wks to re-assess motion  3. Avoid abduction and ER of shoulder  4.  May return to work at this point time with light duty, no lifting pushing or pulling with right upper extremity.

## 2018-02-06 NOTE — PROGRESS NOTES
Subjective:     Patient ID: Murray Regalado is a 22 y.o. male.    Chief Complaint:    History of Present Illness  Murray Regalado {presents/returns:70618} to clinic today for evaluation of ***     Social History     Occupational History   •  [employer]     Social History Main Topics   • Smoking status: Never Smoker   • Smokeless tobacco: Never Used   • Alcohol use 1.2 oz/week     2 Cans of beer per week      Comment: social   • Drug use: No   • Sexual activity: Defer      Past Medical History:   Diagnosis Date   • H/O Bell's palsy     2015, RESOLVED WITH STEROIDS   • Hypertension    • Lactose intolerance    • Right shoulder pain     SCHEDULED FOR SURGERY   • Shoulder injury     right     Past Surgical History:   Procedure Laterality Date   • SHOULDER ARTHROSCOPY Right 11/15/2017    Procedure: SHOULDER ARTHROSCOPY, labral repair, and all associated procedures;  Surgeon: Negro Martinez MD;  Location: [facility];  Service:    • TONSILLECTOMY     • TOOTH EXTRACTION  05/2017       Family History   Problem Relation Age of Onset   • Diabetes Mother    • COPD Mother      smoker   • Hypertension Father    • Lung cancer Father    • Alcohol abuse Brother          Review of Systems   Constitutional: Negative for chills, diaphoresis, fever and unexpected weight change.   HENT: Negative for hearing loss, nosebleeds, sore throat and tinnitus.    Eyes: Negative for pain and visual disturbance.   Respiratory: Negative for cough, shortness of breath and wheezing.    Cardiovascular: Negative for chest pain and palpitations.   Gastrointestinal: Negative for abdominal pain, diarrhea, nausea and vomiting.   Endocrine: Negative for cold intolerance, heat intolerance and polydipsia.   Genitourinary: Negative for difficulty urinating, dysuria and hematuria.   Musculoskeletal: Positive for arthralgias.   Skin: Negative for rash and wound.   Allergic/Immunologic: Negative for environmental allergies.   Neurological: Negative for  dizziness, syncope and numbness.   Hematological: Does not bruise/bleed easily.   Psychiatric/Behavioral: Negative for dysphoric mood and sleep disturbance. The patient is not nervous/anxious.    All other systems reviewed and are negative.          Objective:  There were no vitals filed for this visit.  There were no vitals filed for this visit.  There is no height or weight on file to calculate BMI.  ***     Ortho Exam    ***  Imaging:  ***  Assessment:     No diagnosis found.      Plan:  PACO query complete.          Discussed treatment options at length with patient at today's visit. ***    Murray Regalado *** was in agreement with plan and had all questions answered.     Orders:  No orders of the defined types were placed in this encounter.      Medications:  No orders of the defined types were placed in this encounter.      Followup:  No Follow-up on file.    There are no diagnoses linked to this encounter.      Dictated utilizing Dragon dictation

## 2018-02-06 NOTE — PROGRESS NOTES
Physical Therapy Daily Progress Note    Time In 9:00  Time Out 10:48    Visit # : 19  Murray Regalado reports: shoulder did much better over the weekend than Friday.  States it is still stiff at times.    Subjective     Objective   See Exercise, Manual, and Modality Logs for complete treatment.       Assessment & Plan     Assessment  Assessment details: Pt is progressing well with treatment.  Pt with improved PROM all planes today compared to Friday's treatment.  Pt continues to progress with all strengthening exercises.  Will continue pushing ROM in flex and ER with strengthening 3x/week.            Progress per Plan of Care           Manual Therapy:    15     mins  64510;  Therapeutic Exercise:   40      mins  16510;     Neuromuscular Logan:        mins  85593;    Therapeutic Activity:   10       mins  82496;     Gait Training:           mins  44806;     Ultrasound:          mins  57305;    Electrical Stimulation:    15     mins  80086 ( );  Dry Needling          mins self-pay    Timed Treatment:   65   mins   Total Treatment:     108   mins    Adele Carpio, PT  Physical Therapist

## 2018-02-07 ENCOUNTER — TREATMENT (OUTPATIENT)
Dept: PHYSICAL THERAPY | Facility: CLINIC | Age: 23
End: 2018-02-07

## 2018-02-07 DIAGNOSIS — Z98.890 STATUS POST LABRAL REPAIR OF SHOULDER: Primary | ICD-10-CM

## 2018-02-07 PROCEDURE — 97530 THERAPEUTIC ACTIVITIES: CPT | Performed by: PHYSICAL THERAPIST

## 2018-02-07 PROCEDURE — 97110 THERAPEUTIC EXERCISES: CPT | Performed by: PHYSICAL THERAPIST

## 2018-02-07 PROCEDURE — 97140 MANUAL THERAPY 1/> REGIONS: CPT | Performed by: PHYSICAL THERAPIST

## 2018-02-07 NOTE — PROGRESS NOTES
Physical Therapy Daily Progress Note    Time In 8:32  Time Out 10:24    Visit # : 20  Murray Regalado reports: saw the doctor, follows up in 3 weeks. Pt states shoulder is feeling good, shoulder didn't have any pain last night.    Subjective     Objective   See Exercise, Manual, and Modality Logs for complete treatment.       Assessment & Plan     Assessment  Assessment details: Pt is progressing well with treatment.  Pt with improved PROM all planes, but still has tightness with flexion and ER.   Pt continues to progress with all strengthening exercises.  Will continue pushing ROM in flex and ER with strengthening 3x/week.            Progress per Plan of Care           Manual Therapy:   15      mins  01779;  Therapeutic Exercise:    40     mins  43809;     Neuromuscular Logan:        mins  32394;    Therapeutic Activity:    10      mins  99673;     Gait Training:           mins  69929;     Ultrasound:          mins  94906;    Electrical Stimulation:   15      mins  60318 ( );  Dry Needling          mins self-pay    Timed Treatment:      mins   Total Treatment:     112   mins    Adele Carpio, PT  Physical Therapist

## 2018-02-09 ENCOUNTER — TREATMENT (OUTPATIENT)
Dept: PHYSICAL THERAPY | Facility: CLINIC | Age: 23
End: 2018-02-09

## 2018-02-09 DIAGNOSIS — Z98.890 STATUS POST LABRAL REPAIR OF SHOULDER: Primary | ICD-10-CM

## 2018-02-09 PROCEDURE — 97110 THERAPEUTIC EXERCISES: CPT | Performed by: PHYSICAL THERAPIST

## 2018-02-09 PROCEDURE — 97140 MANUAL THERAPY 1/> REGIONS: CPT | Performed by: PHYSICAL THERAPIST

## 2018-02-09 PROCEDURE — 97530 THERAPEUTIC ACTIVITIES: CPT | Performed by: PHYSICAL THERAPIST

## 2018-02-09 NOTE — PROGRESS NOTES
"Physical Therapy Daily Progress Note    Time In 09:30   Time Out 11:36    Visit # : 21  Murray Regalado reports: his shoulder is feeling better today.  Denies having any shoulder pain this morning, but it is also the second day of steroid.      Subjective     Objective   See Exercise, Manual, and Modality Logs for complete treatment.       Assessment & Plan     Assessment  Assessment details: Pt is progressing well with treatment.  Pt with improved flexibility during PROM stretches with flexion and ER today.  Pt continues to progress with all strengthening exercises.  Pt c/o of some \"pinching\" at endranges of flex stretches, but stated decreased pain with long axis traction during PROM stretches.  Attempted ball toss with mini tramp, but pt had some increased pain when catching the ball.  Will continue pushing ROM in flex and ER with strengthening 3x/week.            Progress per Plan of Care           Manual Therapy:    15     mins  48878;  Therapeutic Exercise:    40     mins  15717;     Neuromuscular Logan:        mins  70445;    Therapeutic Activity:    10      mins  73032;     Gait Training:           mins  81909;     Ultrasound:          mins  80803;    Electrical Stimulation:    15     mins  60166 ( );  Dry Needling          mins self-pay    Timed Treatment:   65   mins   Total Treatment:     126   mins    Adele Carpio, PT  Physical Therapist  "

## 2018-02-13 ENCOUNTER — TREATMENT (OUTPATIENT)
Dept: PHYSICAL THERAPY | Facility: CLINIC | Age: 23
End: 2018-02-13

## 2018-02-13 DIAGNOSIS — Z98.890 STATUS POST LABRAL REPAIR OF SHOULDER: Primary | ICD-10-CM

## 2018-02-13 PROCEDURE — 97140 MANUAL THERAPY 1/> REGIONS: CPT | Performed by: PHYSICAL THERAPIST

## 2018-02-13 PROCEDURE — 97110 THERAPEUTIC EXERCISES: CPT | Performed by: PHYSICAL THERAPIST

## 2018-02-13 PROCEDURE — 97530 THERAPEUTIC ACTIVITIES: CPT | Performed by: PHYSICAL THERAPIST

## 2018-02-13 NOTE — PROGRESS NOTES
Physical Therapy Daily Progress Note    Time In 9:00  Time Out 11:02    Visit # : 22  Murray Regalado reports: his shoulder has been good all weekend, but woke up this morning feeling stiff.       Subjective     Objective   See Exercise, Manual, and Modality Logs for complete treatment.       Assessment & Plan     Assessment  Assessment details: Pt is progressing well with treatment.  Pt with improved flexibility during PROM stretches with flexion and ER today.  Pt continues to progress with all strengthening exercises.  Pt able to do ball toss vs mini tramp today without increased pain.   Will continue pushing ROM in flex and ER with strengthening 3x/week.            Progress per Plan of Care           Manual Therapy:    15     mins  40650;  Therapeutic Exercise:   40      mins  67136;     Neuromuscular Logan:        mins  62918;    Therapeutic Activity:    10      mins  30020;     Gait Training:           mins  22454;     Ultrasound:          mins  16967;    Electrical Stimulation:    15     mins  64242 ( );  Dry Needling          mins self-pay    Timed Treatment:   65   mins   Total Treatment:     122   mins    Adele Carpio, PT  Physical Therapist

## 2018-02-14 ENCOUNTER — TREATMENT (OUTPATIENT)
Dept: PHYSICAL THERAPY | Facility: CLINIC | Age: 23
End: 2018-02-14

## 2018-02-14 DIAGNOSIS — Z98.890 STATUS POST LABRAL REPAIR OF SHOULDER: Primary | ICD-10-CM

## 2018-02-14 PROCEDURE — 97110 THERAPEUTIC EXERCISES: CPT | Performed by: PHYSICAL THERAPIST

## 2018-02-14 PROCEDURE — 97530 THERAPEUTIC ACTIVITIES: CPT | Performed by: PHYSICAL THERAPIST

## 2018-02-14 PROCEDURE — 97140 MANUAL THERAPY 1/> REGIONS: CPT | Performed by: PHYSICAL THERAPIST

## 2018-02-14 NOTE — PROGRESS NOTES
Physical Therapy Daily Progress Note    Time In 8:31  Time Out 10:26    Visit # : 23  Murray Regalado reports: his shoulder is not bad this morning.  Pt rates his right shoulder pain is a 2-3/10.      Subjective     Objective   See Exercise, Manual, and Modality Logs for complete treatment.       Assessment & Plan     Assessment  Assessment details: Pt is doing well with improving strength, ROM, and decreasing pain.  Pt still with a firm end feel with ER PROM, but improving to WFL.  Will continue to see 3x/week for stretching, strengthening, and modalities PRN.          Progress per Plan of Care           Manual Therapy:   15      mins  36222;  Therapeutic Exercise:   40      mins  40262;     Neuromuscular Logan:        mins  97659;    Therapeutic Activity:    10      mins  36857;     Gait Training:           mins  43478;     Ultrasound:          mins  44629;    Electrical Stimulation:    15     mins  19043 ( );  Dry Needling          mins self-pay    Timed Treatment:  65    mins   Total Treatment:     115   mins    Adele Carpio, PT  Physical Therapist

## 2018-02-16 ENCOUNTER — TREATMENT (OUTPATIENT)
Dept: PHYSICAL THERAPY | Facility: CLINIC | Age: 23
End: 2018-02-16

## 2018-02-16 DIAGNOSIS — Z98.890 STATUS POST LABRAL REPAIR OF SHOULDER: Primary | ICD-10-CM

## 2018-02-16 PROCEDURE — 97140 MANUAL THERAPY 1/> REGIONS: CPT | Performed by: PHYSICAL THERAPIST

## 2018-02-16 PROCEDURE — 97110 THERAPEUTIC EXERCISES: CPT | Performed by: PHYSICAL THERAPIST

## 2018-02-16 PROCEDURE — 97530 THERAPEUTIC ACTIVITIES: CPT | Performed by: PHYSICAL THERAPIST

## 2018-02-16 NOTE — PROGRESS NOTES
"Physical Therapy Daily Progress Note    Time In 8:30  Time Out 10:22    Visit # : 24  Murray Regalado reports: his shoulder has felt great the last two days.  States he did not have any pain yesterday and rates pain a 0/10 this morning.  \"States the steroid seems to be helping.\"      Subjective     Objective       Active Range of Motion     Right Shoulder   Flexion: 158 degrees   Abduction: 170 degrees   External rotation 45°: 35 degrees   Internal rotation 45°: 66 degrees     Passive Range of Motion     Right Shoulder   Abduction: 180 degrees   External rotation 45°: 70 degrees   Internal rotation 45°: 80 degrees      See Exercise, Manual, and Modality Logs for complete treatment.       Assessment & Plan     Assessment  Assessment details: Pt is doing well with improving strength, ROM, and decreasing pain since he was started on the steroid.  Pt still with a firm end feel with ER PROM, but improving to WFL.  Pt continues to be very motivated to get better and tolerates all increases with strengthening exercises.  Will continue to see 3x/week for stretching, strengthening, and modalities PRN.          Progress per Plan of Care           Manual Therapy:    15     mins  84137;  Therapeutic Exercise:    40     mins  74909;     Neuromuscular Logan:        mins  02277;    Therapeutic Activity:     15     mins  87081;     Gait Training:           mins  17896;     Ultrasound:          mins  82333;    Electrical Stimulation:    15     mins  04907 ( );  Dry Needling          mins self-pay    Timed Treatment:   70   mins   Total Treatment:     112   mins    Adele Carpio, PT  Physical Therapist  "

## 2018-02-20 ENCOUNTER — TREATMENT (OUTPATIENT)
Dept: PHYSICAL THERAPY | Facility: CLINIC | Age: 23
End: 2018-02-20

## 2018-02-20 DIAGNOSIS — Z98.890 STATUS POST LABRAL REPAIR OF SHOULDER: Primary | ICD-10-CM

## 2018-02-20 PROCEDURE — 97110 THERAPEUTIC EXERCISES: CPT | Performed by: PHYSICAL THERAPIST

## 2018-02-20 PROCEDURE — 97140 MANUAL THERAPY 1/> REGIONS: CPT | Performed by: PHYSICAL THERAPIST

## 2018-02-20 PROCEDURE — 97530 THERAPEUTIC ACTIVITIES: CPT | Performed by: PHYSICAL THERAPIST

## 2018-02-20 PROCEDURE — 97014 ELECTRIC STIMULATION THERAPY: CPT | Performed by: PHYSICAL THERAPIST

## 2018-02-20 NOTE — PROGRESS NOTES
Physical Therapy Daily Progress Note    Time In 9:00  Time Out 10:43    Visit # : 25  Murray Regalado reports: his shoulder is feeling fine and denies having any pain since last visit.      Subjective     Objective   See Exercise, Manual, and Modality Logs for complete treatment.       Assessment & Plan     Assessment  Assessment details: Pt is doing well with improving strength, ROM, and decreasing pain since he was started on the steroid.  Pt still with a firm end feel with ER PROM, but improving to WFL.  Pt continues to be very motivated to get better and tolerates all increases with strengthening exercises.  Will continue to see 3x/week for stretching, strengthening, and modalities PRN.          Progress per Plan of Care           Manual Therapy:    15     mins  74907;  Therapeutic Exercise:   40      mins  48500;     Neuromuscular Logan:        mins  95776;    Therapeutic Activity:   10       mins  18087;     Gait Training:           mins  66395;     Ultrasound:          mins  04020;    Electrical Stimulation:    15     mins  17979 ( );  Dry Needling          mins self-pay    Timed Treatment:   65   mins   Total Treatment:     103   mins    Adele Carpio, PT  Physical Therapist

## 2018-02-21 ENCOUNTER — TREATMENT (OUTPATIENT)
Dept: PHYSICAL THERAPY | Facility: CLINIC | Age: 23
End: 2018-02-21

## 2018-02-21 DIAGNOSIS — Z98.890 STATUS POST LABRAL REPAIR OF SHOULDER: Primary | ICD-10-CM

## 2018-02-21 PROCEDURE — 97140 MANUAL THERAPY 1/> REGIONS: CPT | Performed by: PHYSICAL THERAPIST

## 2018-02-21 PROCEDURE — 97110 THERAPEUTIC EXERCISES: CPT | Performed by: PHYSICAL THERAPIST

## 2018-02-21 NOTE — PROGRESS NOTES
Physical Therapy Daily Progress Note    Time In 8:33  Time Out 10:21    Visit # : 26  Murray Regalado reports: his shoulder has been good.  Denies any increased pain or soreness since last treatment.  Pt states he walked 2 miles yesterday.      Subjective     Objective   See Exercise, Manual, and Modality Logs for complete treatment.       Assessment & Plan     Assessment  Assessment details: Pt is doing well with improving strength, ROM, and decreasing pain since he was started on the steroid.  Pt still with a firm end feel with ER PROM, but improving to WFL.  Pt continues to be very motivated to get better and tolerates all increases with strengthening exercises.  Will continue to see 3x/week for stretching, strengthening, and modalities PRN.          Progress per Plan of Care           Manual Therapy:    15     mins  53814;  Therapeutic Exercise:    45    mins  80116;     Neuromuscular Logan:        mins  33349;    Therapeutic Activity:          mins  02009;     Gait Training:           mins  42135;     Ultrasound:          mins  75037;    Electrical Stimulation:    15     mins  07021 ( );  Dry Needling          mins self-pay    Timed Treatment:   60   mins   Total Treatment:     108   mins    Adele Carpio, PT  Physical Therapist

## 2018-02-23 ENCOUNTER — TREATMENT (OUTPATIENT)
Dept: PHYSICAL THERAPY | Facility: CLINIC | Age: 23
End: 2018-02-23

## 2018-02-23 DIAGNOSIS — Z98.890 STATUS POST LABRAL REPAIR OF SHOULDER: Primary | ICD-10-CM

## 2018-02-23 PROCEDURE — 97110 THERAPEUTIC EXERCISES: CPT | Performed by: PHYSICAL THERAPIST

## 2018-02-23 PROCEDURE — 97140 MANUAL THERAPY 1/> REGIONS: CPT | Performed by: PHYSICAL THERAPIST

## 2018-02-23 PROCEDURE — 97530 THERAPEUTIC ACTIVITIES: CPT | Performed by: PHYSICAL THERAPIST

## 2018-02-23 NOTE — PROGRESS NOTES
Physical Therapy Daily Progress Note    Time In 8:33  Time Out 10:48    Visit # : 27  Murray Regalado reports: his shoulder was feeling pretty good after last visit.      Subjective     Objective       Active Range of Motion     Right Shoulder   Flexion: 163 degrees   Abduction: 180 degrees   External rotation 45°: 50 degrees   Internal rotation 45°: 80 degrees     Strength/Myotome Testing     Right Shoulder     Planes of Motion   Flexion: 4   Abduction: 4   External rotation at 0°: 4+   Internal rotation at 0°: 5     Isolated Muscles   Biceps: 5   Supraspinatus: 4   Triceps: 4+   Upper trapezius: 5      See Exercise, Manual, and Modality Logs for complete treatment.       Assessment & Plan     Assessment  Assessment details: Pt is doing well with improving strength and ROM.  Still has some c/o pain at endranges of flexion during PROM stretches and D2 flex PNF pattern.  Pt still with a firm end feel with ER PROM, but improving to WFL.  Pt continues to be very motivated to get better and tolerates all increases with strengthening exercises.  Pt fatigued quickly during semi-plank position, but able to tolerate without pain.  Will continue to see 3x/week for stretching, strengthening, and modalities PRN.          Progress per Plan of Care           Manual Therapy:    15     mins  32486;  Therapeutic Exercise:   40      mins  76212;     Neuromuscular Logan:        mins  35998;    Therapeutic Activity:   10       mins  04802;     Gait Training:           mins  18039;     Ultrasound:          mins  98348;    Electrical Stimulation:    15     mins  90384 ( );  Dry Needling          mins self-pay    Timed Treatment:   65   mins   Total Treatment:     135   mins    Adele Carpio, PT  Physical Therapist

## 2018-02-27 ENCOUNTER — TREATMENT (OUTPATIENT)
Dept: PHYSICAL THERAPY | Facility: CLINIC | Age: 23
End: 2018-02-27

## 2018-02-27 DIAGNOSIS — Z98.890 STATUS POST LABRAL REPAIR OF SHOULDER: Primary | ICD-10-CM

## 2018-02-27 PROCEDURE — 97140 MANUAL THERAPY 1/> REGIONS: CPT | Performed by: PHYSICAL THERAPIST

## 2018-02-27 PROCEDURE — 97530 THERAPEUTIC ACTIVITIES: CPT | Performed by: PHYSICAL THERAPIST

## 2018-02-27 PROCEDURE — 97110 THERAPEUTIC EXERCISES: CPT | Performed by: PHYSICAL THERAPIST

## 2018-02-27 NOTE — PROGRESS NOTES
Physical Therapy Daily Progress Note    Time In 9:00  Time Out 10:51    Visit # : 28  Murray Regalado reports: his shoulder is doing okay.      Subjective     Objective   See Exercise, Manual, and Modality Logs for complete treatment.       Assessment & Plan     Assessment  Assessment details: Pt is doing well with improving strength and ROM.  Still has some c/o pain at endranges of flexion during PROM stretches and D2 flex PNF pattern.  Pt still with a firm end feel with ER PROM, but improving to WFL.  Pt able to increase weight with some of his strengthening exercises, but remains weak during CKC exercises.   Will continue to see 3x/week for stretching, strengthening, and modalities PRN.          Progress strengthening /stabilization /functional activity           Manual Therapy:   15      mins  92017;  Therapeutic Exercise:    40     mins  14025;     Neuromuscular Logan:        mins  87577;    Therapeutic Activity:    15      mins  51824;     Gait Training:           mins  43786;     Ultrasound:          mins  50273;    Electrical Stimulation:    15     mins  76907 ( );  Dry Needling          mins self-pay    Timed Treatment:  70    mins   Total Treatment:    111    mins    Adele Carpio, PT  Physical Therapist

## 2018-02-28 ENCOUNTER — TREATMENT (OUTPATIENT)
Dept: PHYSICAL THERAPY | Facility: CLINIC | Age: 23
End: 2018-02-28

## 2018-02-28 DIAGNOSIS — Z98.890 STATUS POST LABRAL REPAIR OF SHOULDER: Primary | ICD-10-CM

## 2018-02-28 PROCEDURE — 97530 THERAPEUTIC ACTIVITIES: CPT | Performed by: PHYSICAL THERAPIST

## 2018-02-28 PROCEDURE — 97110 THERAPEUTIC EXERCISES: CPT | Performed by: PHYSICAL THERAPIST

## 2018-02-28 PROCEDURE — 97140 MANUAL THERAPY 1/> REGIONS: CPT | Performed by: PHYSICAL THERAPIST

## 2018-02-28 NOTE — PROGRESS NOTES
Physical Therapy Daily Progress Note    Time In 8:30  Time Out 10:13    Visit # : 29  Murray Regalado reports: his shoulder feels stiff and sore this morning.       Subjective     Objective   See Exercise, Manual, and Modality Logs for complete treatment.       Assessment & Plan     Assessment  Assessment details: Pt is doing well with improving strength and ROM.  Pt continues to progress his strengthening exercises, but remains weak during CKC exercises.  Pt without pain during PNF D2 pattern at end ranges of flexion today.  Will continue to see 3x/week for stretching, strengthening, and modalities PRN.          Progress per Plan of Care           Manual Therapy:    15     mins  53023;  Therapeutic Exercise:   40      mins  70651;     Neuromuscular Logan:        mins  02003;    Therapeutic Activity:   15       mins  81811;     Gait Training:           mins  21190;     Ultrasound:          mins  97473;    Electrical Stimulation:    15     mins  27919 ( );  Dry Needling          mins self-pay    Timed Treatment:  70    mins   Total Treatment:     103   mins    Adele Carpio, PT  Physical Therapist

## 2018-03-02 ENCOUNTER — TREATMENT (OUTPATIENT)
Dept: PHYSICAL THERAPY | Facility: CLINIC | Age: 23
End: 2018-03-02

## 2018-03-02 DIAGNOSIS — Z98.890 STATUS POST LABRAL REPAIR OF SHOULDER: Primary | ICD-10-CM

## 2018-03-02 PROCEDURE — 97110 THERAPEUTIC EXERCISES: CPT | Performed by: PHYSICAL THERAPIST

## 2018-03-02 PROCEDURE — 97140 MANUAL THERAPY 1/> REGIONS: CPT | Performed by: PHYSICAL THERAPIST

## 2018-03-02 NOTE — PROGRESS NOTES
Physical Therapy Daily Progress Note    Time In 14:50   Time Out 16:36    Visit # : 30  Murray Regalado reports his shoulder was a little sore yesterday, but states it is fine today.    Subjective     Objective   See Exercise, Manual, and Modality Logs for complete treatment.       Assessment & Plan     Assessment  Assessment details: Pt is doing well with improving strength and ROM.  Pt continues to progress his strengthening exercises, but remains weak during CKC exercises.  Pt with less tightness today during PROM all planes.  Not sure if pt's improved flexibility is due to seeing pt in the afternoon vs AM or doing stretching after exercises.   Will continue to see 3x/week for stretching, strengthening, and modalities PRN.          Progress per Plan of Care           Manual Therapy:    15     mins  85265;  Therapeutic Exercise:   50      mins  83241;     Neuromuscular Logan:        mins  26190;    Therapeutic Activity:          mins  09799;     Gait Training:           mins  73279;     Ultrasound:          mins  09348;    Electrical Stimulation:    15     mins  99581 ( );  Dry Needling          mins self-pay    Timed Treatment:   65   mins   Total Treatment:     106   mins    Adele Carpio, PT  Physical Therapist

## 2018-03-06 ENCOUNTER — TREATMENT (OUTPATIENT)
Dept: PHYSICAL THERAPY | Facility: CLINIC | Age: 23
End: 2018-03-06

## 2018-03-06 DIAGNOSIS — Z98.890 STATUS POST LABRAL REPAIR OF SHOULDER: Primary | ICD-10-CM

## 2018-03-06 PROCEDURE — 97140 MANUAL THERAPY 1/> REGIONS: CPT | Performed by: PHYSICAL THERAPIST

## 2018-03-06 PROCEDURE — 97110 THERAPEUTIC EXERCISES: CPT | Performed by: PHYSICAL THERAPIST

## 2018-03-06 NOTE — PROGRESS NOTES
Physical Therapy Daily Progress Note    Time In 9:30  Time Out 11:10    Visit # : 31  Murray Regalado reports: his shoulder has been good over the last few days.  States it is a little sore this morning.      Subjective     Objective   See Exercise, Manual, and Modality Logs for complete treatment.       Assessment & Plan     Assessment  Assessment details: Pt is doing well with improving strength and ROM.  Pt continues to progress his strengthening exercises, but remains weak during CKC exercises.  Pt with less tightness today during PROM all planes.  Will continue to see 3x/week for stretching, strengthening, and modalities PRN.          Progress per Plan of Care           Manual Therapy:    15     mins  04905;  Therapeutic Exercise:   50      mins  95137;     Neuromuscular Logan:        mins  32478;    Therapeutic Activity:          mins  60702;     Gait Training:           mins  14846;     Ultrasound:          mins  21542;    Electrical Stimulation:    15     mins  59551 ( );  Dry Needling          mins self-pay    Timed Treatment:  65    mins   Total Treatment:     100   mins    Adele Carpio, PT  Physical Therapist

## 2018-03-07 ENCOUNTER — TREATMENT (OUTPATIENT)
Dept: PHYSICAL THERAPY | Facility: CLINIC | Age: 23
End: 2018-03-07

## 2018-03-07 DIAGNOSIS — Z98.890 STATUS POST LABRAL REPAIR OF SHOULDER: Primary | ICD-10-CM

## 2018-03-07 PROCEDURE — 97110 THERAPEUTIC EXERCISES: CPT | Performed by: PHYSICAL THERAPIST

## 2018-03-07 PROCEDURE — 97530 THERAPEUTIC ACTIVITIES: CPT | Performed by: PHYSICAL THERAPIST

## 2018-03-07 NOTE — PROGRESS NOTES
Physical Therapy Daily Progress Note    Time In 14:30  Time Out ***    Visit # : 32  Murray Regalado reports:     Subjective     Objective   See Exercise, Manual, and Modality Logs for complete treatment.       Assessment/Plan    {AMB PT PLAN (SOAP Note):68471}           Manual Therapy:    ***     mins  06518;  Therapeutic Exercise:    ***     mins  78226;     Neuromuscular Logan:    ***    mins  87359;    Therapeutic Activity:     ***     mins  12597;     Gait Training:      ***     mins  03556;     Ultrasound:     ***     mins  12937;    Electrical Stimulation:    ***     mins  14580 ( );  Dry Needling     ***     mins self-pay    Timed Treatment:   ***   mins   Total Treatment:     ***   mins    Adele Carpio, PT  Physical Therapist

## 2018-03-07 NOTE — PROGRESS NOTES
Physical Therapy Progress Note  3/7/2018  MIKALA Felix    Re: Murray Regalado    Time In 14:30  Time Out 16:22  ________________________________________________________________    Mr. Murray Regalado, has attended 32/32 PT sessions.  Treatment has consisted of: PROM, AAROM,AROM,RROM,joint mobilizations, HEP, and IFC and cold packs.       S: Mr. Murray Regalado states: with normal daily activities his shoulder pain is a 0/10, but with lifting or using it the discomfort is a 3/10.      Subjective     Objective       Active Range of Motion     Right Shoulder   Flexion: 167 degrees   Abduction: 180 degrees   External rotation 45°: 66 degrees   Internal rotation 45°: 75 degrees     Passive Range of Motion     Right Shoulder   Flexion: 175 degrees   External rotation 45°: 76 (measured at 65 degrees of ABD ) degrees   Internal rotation 45°: 83 degrees     Strength/Myotome Testing     Right Shoulder     Planes of Motion   Flexion: 4   Abduction: 3+   External rotation at 0°: 4   Internal rotation at 0°: 5     Isolated Muscles   Biceps: 5   Supraspinatus: 4   Triceps: 5   Upper trapezius: 5      See Exercise, Manual, and Modality Logs for complete treatment.       Assessment & Plan     Assessment  Assessment details: Pt is doing well with improving strength and ROM.  Pt continues to progress his strengthening exercises, but still has weakness in abduction and ER.  Will continue to see 3x/week for stretching, strengthening, and modalities PRN, but please advise.           Progress per Plan of Care           Manual Therapy:    15     mins  23705;  Therapeutic Exercise:    45     mins  72024;     Neuromuscular Logan:        mins  13428;    Therapeutic Activity:    10      mins  66725;     Gait Training:           mins  30434;     Ultrasound:          mins  17013;    Electrical Stimulation:    15     mins  62585 ( );  Dry Needling          mins self-pay    Timed Treatment:   70   mins   Total Treatment:     112    mins    Adele Carpio, PT  Physical Therapist

## 2018-03-08 ENCOUNTER — OFFICE VISIT (OUTPATIENT)
Dept: ORTHOPEDIC SURGERY | Facility: CLINIC | Age: 23
End: 2018-03-08

## 2018-03-08 DIAGNOSIS — Z98.890 STATUS POST ARTHROSCOPY OF SHOULDER: ICD-10-CM

## 2018-03-08 DIAGNOSIS — M75.41 IMPINGEMENT SYNDROME OF RIGHT SHOULDER: ICD-10-CM

## 2018-03-08 DIAGNOSIS — R52 PAIN: Primary | ICD-10-CM

## 2018-03-08 PROCEDURE — 20610 DRAIN/INJ JOINT/BURSA W/O US: CPT | Performed by: ORTHOPAEDIC SURGERY

## 2018-03-08 PROCEDURE — 99213 OFFICE O/P EST LOW 20 MIN: CPT | Performed by: ORTHOPAEDIC SURGERY

## 2018-03-08 RX ORDER — TRIAMCINOLONE ACETONIDE 40 MG/ML
80 INJECTION, SUSPENSION INTRA-ARTICULAR; INTRAMUSCULAR
Status: COMPLETED | OUTPATIENT
Start: 2018-03-08 | End: 2018-03-08

## 2018-03-08 RX ORDER — LIDOCAINE HYDROCHLORIDE 10 MG/ML
4 INJECTION, SOLUTION EPIDURAL; INFILTRATION; INTRACAUDAL; PERINEURAL
Status: COMPLETED | OUTPATIENT
Start: 2018-03-08 | End: 2018-03-08

## 2018-03-08 RX ADMIN — TRIAMCINOLONE ACETONIDE 80 MG: 40 INJECTION, SUSPENSION INTRA-ARTICULAR; INTRAMUSCULAR at 08:20

## 2018-03-08 RX ADMIN — LIDOCAINE HYDROCHLORIDE 4 ML: 10 INJECTION, SOLUTION EPIDURAL; INFILTRATION; INTRACAUDAL; PERINEURAL at 08:20

## 2018-03-08 NOTE — PROGRESS NOTES
Subjective:     Patient ID: Murray Regalado is a 22 y.o. male.    Chief Complaint:  F/u s/p right shoulder anterior, posterior labral repair  DOS 11/15/2017  History of Present Illness  Murray Regalado returns to clinic today for evaluation of right shoulder, states he's continuing to make progress in regards to his range of motion, did note improvement with recent prednisone taper.  He still is having some stiffness at terminal external rotation but notes that over the last 2-3 weeks he's been having some increasing pain on maximal forward flexion in the subacromial space with pain localized laterally, rates as a 4-5 out of 10, intermittent in nature.  Denies any associated numbness or tingling, denies radiation of pain.  No fevers chills or sweats, no issues with incisions.     Social History     Occupational History   •  [employer]     Social History Main Topics   • Smoking status: Never Smoker   • Smokeless tobacco: Never Used   • Alcohol use 1.2 oz/week     2 Cans of beer per week      Comment: social   • Drug use: No   • Sexual activity: Defer      Past Medical History:   Diagnosis Date   • H/O Bell's palsy     2015, RESOLVED WITH STEROIDS   • Hypertension    • Lactose intolerance    • Right shoulder pain     SCHEDULED FOR SURGERY   • Shoulder injury     right     Past Surgical History:   Procedure Laterality Date   • SHOULDER ARTHROSCOPY Right 11/15/2017    Procedure: SHOULDER ARTHROSCOPY, labral repair, and all associated procedures;  Surgeon: Negro Martinez MD;  Location: [facility];  Service:    • TONSILLECTOMY     • TOOTH EXTRACTION  05/2017       Family History   Problem Relation Age of Onset   • Diabetes Mother    • COPD Mother      smoker   • Hypertension Father    • Lung cancer Father    • Alcohol abuse Brother          Review of Systems   Constitutional: Negative for chills, diaphoresis, fever and unexpected weight change.   HENT: Negative for hearing loss, nosebleeds, sore throat and  tinnitus.    Eyes: Negative for pain and visual disturbance.   Respiratory: Negative for cough, shortness of breath and wheezing.    Cardiovascular: Negative for chest pain and palpitations.   Gastrointestinal: Negative for abdominal pain, diarrhea, nausea and vomiting.   Endocrine: Negative for cold intolerance, heat intolerance and polydipsia.   Genitourinary: Negative for difficulty urinating, dysuria and hematuria.   Musculoskeletal: Positive for arthralgias.   Skin: Negative for rash and wound.   Allergic/Immunologic: Negative for environmental allergies.   Neurological: Negative for dizziness, syncope and numbness.   Hematological: Does not bruise/bleed easily.   Psychiatric/Behavioral: Negative for dysphoric mood and sleep disturbance. The patient is not nervous/anxious.    All other systems reviewed and are negative.          Objective:  There were no vitals filed for this visit.  There were no vitals filed for this visit.  There is no height or weight on file to calculate BMI.  General: No acute distress.  Resp: normal respiratory effort  Skin: no rashes or wounds; normal turgor  Psych: mood and affect appropriate; recent and remote memory intact         Ortho Exam    Right shoulder-active forward flexion 165°, active forward flexion 170°, active external rotation 40°, passive external rotation 45°, 4+ out of 5 strength in all planes of motion.  Positive sensation light touch all distibution's right hand symmetric to the left, brisk cap refill all digits, 2+ radial pulse right wrist.  Positive Little and Neer's with moderate tenderness over the lateral subacromial space.  Imaging:    Assessment:       1. Pain    2. Impingement syndrome of right shoulder    3. Status post arthroscopy of shoulder          Plan:          Discussed treatment options at length with patient at today's visit. After reviewing options, patient wished to proceed with right shoulder subacromial injection to address his current issue  with subacromial bursitis.  Follow-up in 4 weeks, no work at this point in time but I do anticipate and hopefully be able to return to full duty within 4-6 weeks from now without restrictions.  He can back down on therapy to twice a week at this point time with continue work on home exercises for strengthening and stretching.  If he does not get significant improvement with his terminal external rotation and motion that we may consider ultrasound-guided glenohumeral joint injection.    Murray Regalado was in agreement with plan and had all questions answered.     Orders:  Orders Placed This Encounter   Procedures   • Large Joint Arthrocentesis       Medications:  No orders of the defined types were placed in this encounter.      Followup:  Return in about 4 weeks (around 4/5/2018).    Murray was seen today for follow-up and pain.    Diagnoses and all orders for this visit:    Pain  -     Large Joint Arthrocentesis    Impingement syndrome of right shoulder    Status post arthroscopy of shoulder        Dictated utilizing Dragon dictation   Large Joint Arthrocentesis  Date/Time: 3/8/2018 8:20 AM  Consent given by: patient  Site marked: site marked  Timeout: Immediately prior to procedure a time out was called to verify the correct patient, procedure, equipment, support staff and site/side marked as required   Supporting Documentation  Indications: pain   Procedure Details  Location: shoulder - R subacromial bursa  Preparation: Patient was prepped and draped in the usual sterile fashion  Needle size: 22 G  Medications administered: 4 mL lidocaine PF 1% 1 %; 80 mg triamcinolone acetonide 40 MG/ML  Patient tolerance: patient tolerated the procedure well with no immediate complications

## 2018-03-09 ENCOUNTER — TREATMENT (OUTPATIENT)
Dept: PHYSICAL THERAPY | Facility: CLINIC | Age: 23
End: 2018-03-09

## 2018-03-09 DIAGNOSIS — Z98.890 STATUS POST LABRAL REPAIR OF SHOULDER: Primary | ICD-10-CM

## 2018-03-09 PROCEDURE — 97530 THERAPEUTIC ACTIVITIES: CPT | Performed by: PHYSICAL THERAPIST

## 2018-03-09 PROCEDURE — 97110 THERAPEUTIC EXERCISES: CPT | Performed by: PHYSICAL THERAPIST

## 2018-03-09 NOTE — PROGRESS NOTES
Physical Therapy Daily Progress Note    Time In 8:27  Time Out 10:25    Visit # : 33  Murray Regalado reports: his shoulder is doing well and thinks the shot has really helped.  States he does not have as much pain at the top of his shoulder when reaching up.      Subjective     Objective   See Exercise, Manual, and Modality Logs for complete treatment.       Assessment & Plan     Assessment  Assessment details: Pt is doing well with improving strength and ROM.  Pt continues to progress his strengthening exercises, but still has weakness in abduction and ER.  Pt with less tightness and pain during PROM stretching with ER and flexion.   Will decrease frequency to 2x/week for strengthening, stretching and modalities PRN.          Progress per Plan of Care           Manual Therapy:    15     mins  83389;  Therapeutic Exercise:    45     mins  72994;     Neuromuscular Logan:        mins  82430;    Therapeutic Activity:    15      mins  43249;     Gait Training:           mins  80849;     Ultrasound:          mins  83752;    Electrical Stimulation:    15     mins  05220 ( );  Dry Needling          mins self-pay    Timed Treatment:   75   mins   Total Treatment:     118   mins    Adele Carpio, PT  Physical Therapist

## 2018-03-13 ENCOUNTER — TREATMENT (OUTPATIENT)
Dept: PHYSICAL THERAPY | Facility: CLINIC | Age: 23
End: 2018-03-13

## 2018-03-13 DIAGNOSIS — Z98.890 STATUS POST LABRAL REPAIR OF SHOULDER: Primary | ICD-10-CM

## 2018-03-13 PROCEDURE — 97110 THERAPEUTIC EXERCISES: CPT | Performed by: PHYSICAL THERAPIST

## 2018-03-13 NOTE — PROGRESS NOTES
Physical Therapy Daily Progress Note    Time In 8:40  Time Out 10:20    Visit # : 34  Murray Regalado reports: his shoulder was hurting some after last treatment, but it improved later that night.  Rates his shoulder pain a 4-5/10, but denies having any pain over the weekend.      Subjective     Objective   See Exercise, Manual, and Modality Logs for complete treatment.       Assessment & Plan     Assessment  Assessment details: Pt is doing well with improving strength and ROM.  Pt continues to progress his strengthening exercises, but still has weakness in abduction and ER.  Pt with less tightness and pain during PROM stretching with ER and flexion.  Pt with some increase in pain and soreness after last treatment, but pt reports discomfort resolved in 24 hours.   Will decrease frequency to 2x/week for strengthening, stretching and modalities PRN.          Progress per Plan of Care           Manual Therapy:    15     mins  55610;  Therapeutic Exercise:   60      mins  99857;     Neuromuscular Logan:        mins  61256;    Therapeutic Activity:          mins  61544;     Gait Training:           mins  58239;     Ultrasound:          mins  53014;    Electrical Stimulation:    15     mins  65762 ( );  Dry Needling          mins self-pay    Timed Treatment:  75    mins   Total Treatment:     100   mins    Adele Carpio, PT  Physical Therapist

## 2018-03-16 ENCOUNTER — TREATMENT (OUTPATIENT)
Dept: PHYSICAL THERAPY | Facility: CLINIC | Age: 23
End: 2018-03-16

## 2018-03-16 DIAGNOSIS — Z98.890 STATUS POST LABRAL REPAIR OF SHOULDER: Primary | ICD-10-CM

## 2018-03-16 PROCEDURE — 97110 THERAPEUTIC EXERCISES: CPT | Performed by: PHYSICAL THERAPIST

## 2018-03-16 PROCEDURE — 97014 ELECTRIC STIMULATION THERAPY: CPT | Performed by: PHYSICAL THERAPIST

## 2018-03-16 NOTE — PROGRESS NOTES
Physical Therapy Daily Progress Note    Time In 9:35  Time Out 11:26    Visit # : 35  Murray Regalado reports: no new c/o of right shoulder pain or soreness since last visit.      Subjective     Objective       Active Range of Motion     Right Shoulder   External rotation 90°: 65 degrees    Passive Range of Motion     Right Shoulder   Internal rotation 45°: 71 degrees      See Exercise, Manual, and Modality Logs for complete treatment.       Assessment & Plan     Assessment  Assessment details: Pt continues to progress strengthening with minimal c/o discomfort post exercise.  Pt still limited in AROM vs ER, but is functional.  Pt with some popping during scaption PROM at end ranges of motion.  Will continue to see pt 2x/week for strengthening per protocol until pt sees MD on4/10/2018.          Progress per Plan of Care           Manual Therapy:    15     mins  46959;  Therapeutic Exercise:    60     mins  90785;     Neuromuscular Logan:        mins  85008;    Therapeutic Activity:          mins  12946;     Gait Training:           mins  27116;     Ultrasound:          mins  77488;    Electrical Stimulation:    15     mins  46768 ( );  Dry Needling          mins self-pay    Timed Treatment:  75    mins   Total Treatment:     111   mins    Adele Carpio, PT  Physical Therapist

## 2018-03-20 ENCOUNTER — TREATMENT (OUTPATIENT)
Dept: PHYSICAL THERAPY | Facility: CLINIC | Age: 23
End: 2018-03-20

## 2018-03-20 ENCOUNTER — TELEPHONE (OUTPATIENT)
Dept: ORTHOPEDIC SURGERY | Facility: CLINIC | Age: 23
End: 2018-03-20

## 2018-03-20 DIAGNOSIS — M75.41 IMPINGEMENT SYNDROME OF RIGHT SHOULDER: ICD-10-CM

## 2018-03-20 DIAGNOSIS — S43.431D TEAR OF RIGHT GLENOID LABRUM, SUBSEQUENT ENCOUNTER: ICD-10-CM

## 2018-03-20 DIAGNOSIS — Z98.890 STATUS POST ARTHROSCOPY OF SHOULDER: Primary | ICD-10-CM

## 2018-03-20 DIAGNOSIS — Z98.890 STATUS POST LABRAL REPAIR OF SHOULDER: Primary | ICD-10-CM

## 2018-03-20 PROCEDURE — 97110 THERAPEUTIC EXERCISES: CPT | Performed by: PHYSICAL THERAPIST

## 2018-03-20 NOTE — TELEPHONE ENCOUNTER
PT is asking if they can start more strengthening with the patient since he will have to be able to complete certain tasks before returning to work as a .

## 2018-03-23 ENCOUNTER — TREATMENT (OUTPATIENT)
Dept: PHYSICAL THERAPY | Facility: CLINIC | Age: 23
End: 2018-03-23

## 2018-03-23 DIAGNOSIS — Z98.890 STATUS POST LABRAL REPAIR OF SHOULDER: Primary | ICD-10-CM

## 2018-03-23 PROCEDURE — 97140 MANUAL THERAPY 1/> REGIONS: CPT | Performed by: PHYSICAL THERAPIST

## 2018-03-23 PROCEDURE — 97110 THERAPEUTIC EXERCISES: CPT | Performed by: PHYSICAL THERAPIST

## 2018-03-23 NOTE — PROGRESS NOTES
Physical Therapy Daily Progress Note    Time In 9:29  Time Out 11:00    Visit # : 37  Murray Regalado reports: his shoulder is doing good.      Subjective     Objective   See Exercise, Manual, and Modality Logs for complete treatment.     Hold some exercises secondary to pt c/o a sharp pain during body blade flex/ext.  Pt could not reach end range of motion without pain today.      Assessment & Plan     Assessment  Assessment details: Pt continues to progress strengthening, but pt had decreased tolerance after pt had a sharp pain and pop during shoulder flex exercise.  Pt has posterior superior palpable popping inferior to acromion during flex and scaption.  Question if this is still to a muscle imbalance and pt is getting some shoulder impingement.  Will continue to see pt 2x/week for strengthening per protocol until pt sees MD on 4/10/2018.  Will reassess pt's symptoms next visit.  Pt is to limit overhead activity and strengthening over the weekend.          Progress per Plan of Care           Manual Therapy:    15     mins  35361;  Therapeutic Exercise:    50     mins  94244;     Neuromuscular Logan:        mins  61572;    Therapeutic Activity:          mins  54765;     Gait Training:           mins  37930;     Ultrasound:          mins  96944;    Electrical Stimulation:    15     mins  53350 ( );  Dry Needling          mins self-pay    Timed Treatment:   65   mins   Total Treatment:     90   mins    Adele Carpio, PT  Physical Therapist

## 2018-03-27 ENCOUNTER — TREATMENT (OUTPATIENT)
Dept: PHYSICAL THERAPY | Facility: CLINIC | Age: 23
End: 2018-03-27

## 2018-03-27 DIAGNOSIS — Z98.890 STATUS POST LABRAL REPAIR OF SHOULDER: Primary | ICD-10-CM

## 2018-03-27 PROCEDURE — 97110 THERAPEUTIC EXERCISES: CPT | Performed by: PHYSICAL THERAPIST

## 2018-03-27 NOTE — PROGRESS NOTES
Physical Therapy Daily Progress Note    Time In 8:33  Time Out 10:17    Visit # : 38  Murray Regalado reports: he played basketball over the weekend and his shoulder did fine.       Subjective     Objective   See Exercise, Manual, and Modality Logs for complete treatment.       Assessment & Plan     Assessment  Assessment details: Pt with increased tolerance to strengthening exercises today vs last Friday's treatment.  Pt denies having any increased pain over the weekend and was able to complete all strengthening exercises today without pain.  Decreased weight with shoulder level and above exercises.  Pt still has some popping in right shoulder during PROM and AROM shoulder ABD in 120 degrees and above.  Will continue strengthening per pt's tolerance until pt sees MD 4/10/2017.          Progress per Plan of Care           Manual Therapy:    15     mins  04142;  Therapeutic Exercise:    60     mins  08688;     Neuromuscular Logan:        mins  98293;    Therapeutic Activity:          mins  77705;     Gait Training:           mins  20746;     Ultrasound:          mins  01369;    Electrical Stimulation:    15     mins  23927 ( );  Dry Needling          mins self-pay    Timed Treatment:  75    mins   Total Treatment:     104   mins    Adele Carpio, PT  Physical Therapist

## 2018-03-30 ENCOUNTER — TREATMENT (OUTPATIENT)
Dept: PHYSICAL THERAPY | Facility: CLINIC | Age: 23
End: 2018-03-30

## 2018-03-30 DIAGNOSIS — Z98.890 STATUS POST LABRAL REPAIR OF SHOULDER: Primary | ICD-10-CM

## 2018-03-30 PROCEDURE — 97110 THERAPEUTIC EXERCISES: CPT | Performed by: PHYSICAL THERAPIST

## 2018-03-30 PROCEDURE — 97530 THERAPEUTIC ACTIVITIES: CPT | Performed by: PHYSICAL THERAPIST

## 2018-03-30 NOTE — PROGRESS NOTES
Physical Therapy Daily Progress Note    Time In 9:26  Time Out 11:15    Visit # : 39  Murray Regalado reports: his shoulder is doing fine.  It still pops when she raises his arm out to the side at a certain range then it goes away.  States it does not cause pain.      Subjective     Objective   See Exercise, Manual, and Modality Logs for complete treatment.       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  Pt continues to have some popping with shoulder abd at approximately 120 degrees and above, but denies pain or pinching.  Will continue to push strengthening 2x/week next week then pt goes to MD.          Progress per Plan of Care           Manual Therapy:    10     mins  40154;  Therapeutic Exercise:    50     mins  39746;     Neuromuscular Logan:        mins  46226;    Therapeutic Activity:    20      mins  01802;     Gait Training:           mins  64699;     Ultrasound:          mins  85204;    Electrical Stimulation:    15     mins  61138 ( );  Dry Needling          mins self-pay    Timed Treatment:   80   mins   Total Treatment:     109   mins    Adele Carpio, PT  Physical Therapist

## 2018-04-04 ENCOUNTER — TREATMENT (OUTPATIENT)
Dept: PHYSICAL THERAPY | Facility: CLINIC | Age: 23
End: 2018-04-04

## 2018-04-04 DIAGNOSIS — Z98.890 STATUS POST LABRAL REPAIR OF SHOULDER: Primary | ICD-10-CM

## 2018-04-04 PROCEDURE — 97530 THERAPEUTIC ACTIVITIES: CPT | Performed by: PHYSICAL THERAPIST

## 2018-04-04 PROCEDURE — 97110 THERAPEUTIC EXERCISES: CPT | Performed by: PHYSICAL THERAPIST

## 2018-04-04 NOTE — PROGRESS NOTES
Physical Therapy Daily Progress Note    Time In 9:01  Time Out 10:46    Visit # : 40  Murray Regalado reports: his shoulder is feeling good.      Subjective     Objective   See Exercise, Manual, and Modality Logs for complete treatment.     Pt performed most of his standing shoulder strengthening exercises with full gear and backpack on to simulate work task when he is able to return to full duty.      Assessment & Plan     Assessment  Assessment details: Pt continues to progress well with treatment and was able to perform most of his  exercises wearing his full work gear and back pack without c/o pain in his right shoulder.  Pt was not able to do some exercises with work gear secondary to positioning and not due to pain.  Will see pt one more visit then pt returns to MD.          Progress per Plan of Care           Manual Therapy:    10     mins  88690;  Therapeutic Exercise:   50      mins  06577;     Neuromuscular Logan:        mins  55561;    Therapeutic Activity:   15       mins  33874;     Gait Training:           mins  75916;     Ultrasound:          mins  54520;    Electrical Stimulation:    15     mins  86798 ( );  Dry Needling          mins self-pay    Timed Treatment:   75   mins   Total Treatment:    105    mins    Adele Carpio, PT  Physical Therapist

## 2018-04-06 ENCOUNTER — TREATMENT (OUTPATIENT)
Dept: PHYSICAL THERAPY | Facility: CLINIC | Age: 23
End: 2018-04-06

## 2018-04-06 DIAGNOSIS — Z98.890 STATUS POST LABRAL REPAIR OF SHOULDER: Primary | ICD-10-CM

## 2018-04-06 PROCEDURE — 97530 THERAPEUTIC ACTIVITIES: CPT | Performed by: PHYSICAL THERAPIST

## 2018-04-06 PROCEDURE — 97110 THERAPEUTIC EXERCISES: CPT | Performed by: PHYSICAL THERAPIST

## 2018-04-06 NOTE — PROGRESS NOTES
Physical Therapy Progress Note  4/6/2018  MIKALA Felix MD     Re: Murray Regalado    Time In 8:53  Time Out 10:15  ________________________________________________________________    Mr. Murray Regalado, Patient seen for 41 sessions.  Treatment has consisted of: PROM, AAROM, AROM, RROM, G-H joint mobs, rhythmic stabilization, job simulation exercises, pt instructed in a HEP, and IFC and cold packs after exercise.         S: Mr. Murray Regalado states: his shoulder is feeling good.  He rates his pain a 0-1/10 with normal daily activities.  Pt states he continues to have popping in his shoulder with shoulder abduction, but denies pain.      Subjective     Objective       Active Range of Motion     Right Shoulder   Flexion: 170 degrees   Abduction: 180 degrees   External rotation 45°: 71 degrees   External rotation BTH: WFL  Internal rotation 45°: 70 degrees   Internal rotation BTB: WFL    Strength/Myotome Testing     Right Shoulder     Planes of Motion   Flexion: 5   Abduction: 4+   External rotation at 0°: 4+   Internal rotation at 0°: 5     Isolated Muscles   Biceps: 5   Lower trapezius: 4+   Middle trapezius: 4+   Rhomboids: 4+   Supraspinatus: 4+   Triceps: 5   Upper trapezius: 5      See Exercise, Manual, and Modality Logs for complete treatment.       Assessment & Plan     Assessment  Assessment details: Pt has done well with therapy and job stimulation exercises over the last two weeks.  Pt has good ROM and strength.  Feel at this time pt could return to work from a PT stand point.  Pt still needs some scapular and RC strengthening, but feel pt can continue this as a HEP.          Other           Manual Therapy:    10     mins  04574;  Therapeutic Exercise:    40     mins  20720;     Neuromuscular Logan:        mins  69989;    Therapeutic Activity:    10      mins  19128;     Gait Training:           mins  51050;     Ultrasound:          mins  55918;    Electrical Stimulation:    15      mins  70430 ( );  Dry Needling          mins self-pay    Timed Treatment:  60    mins   Total Treatment:     82   mins    Adele Carpio, PT  Physical Therapist

## 2018-04-10 ENCOUNTER — OFFICE VISIT (OUTPATIENT)
Dept: ORTHOPEDIC SURGERY | Facility: CLINIC | Age: 23
End: 2018-04-10

## 2018-04-10 DIAGNOSIS — Z98.890 STATUS POST ARTHROSCOPY OF SHOULDER: Primary | ICD-10-CM

## 2018-04-10 PROCEDURE — 99212 OFFICE O/P EST SF 10 MIN: CPT | Performed by: ORTHOPAEDIC SURGERY

## 2018-04-10 NOTE — PROGRESS NOTES
Subjective:     Patient ID: Murray Regalado is a 23 y.o. male.    Chief Complaint:  F/u s/p right shoulder anterior, posterior labral repair  DOS 11/15/2017  History of Present Illness  Murray Regalado returns to clinic today for evaluation of right shoulder, he has progressed very well and been discharged from physical therapy at this point in time.  The only limitation he notes at this point time is some stiffness still in trying to obtain maximal external rotation.  He has made good progress however and feels like his strength is nearly symmetric.  Denies any significant pain at this point time, minimal irritation anteriorly rates as a 1 out of 10 intermittently.  Denies any associated numbness or tingling.  Denies radiation of pain.     Social History     Occupational History   •  [employer]     Social History Main Topics   • Smoking status: Never Smoker   • Smokeless tobacco: Never Used   • Alcohol use 1.2 oz/week     2 Cans of beer per week      Comment: social   • Drug use: No   • Sexual activity: Defer      Past Medical History:   Diagnosis Date   • H/O Bell's palsy     2015, RESOLVED WITH STEROIDS   • Hypertension    • Lactose intolerance    • Right shoulder pain     SCHEDULED FOR SURGERY   • Shoulder injury     right     Past Surgical History:   Procedure Laterality Date   • SHOULDER ARTHROSCOPY Right 11/15/2017    Procedure: SHOULDER ARTHROSCOPY, labral repair, and all associated procedures;  Surgeon: Negro Martinez MD;  Location: [facility];  Service:    • TONSILLECTOMY     • TOOTH EXTRACTION  05/2017       Family History   Problem Relation Age of Onset   • Diabetes Mother    • COPD Mother      smoker   • Hypertension Father    • Lung cancer Father    • Alcohol abuse Brother          Review of Systems   Constitutional: Negative for chills, diaphoresis, fever and unexpected weight change.   HENT: Negative for hearing loss, nosebleeds, sore throat and tinnitus.    Eyes: Negative for pain and  visual disturbance.   Respiratory: Negative for cough, shortness of breath and wheezing.    Cardiovascular: Negative for chest pain and palpitations.   Gastrointestinal: Negative for abdominal pain, diarrhea, nausea and vomiting.   Endocrine: Negative for cold intolerance, heat intolerance and polydipsia.   Genitourinary: Negative for difficulty urinating, dysuria and hematuria.   Musculoskeletal: Positive for arthralgias.   Skin: Negative for rash and wound.   Allergic/Immunologic: Negative for environmental allergies.   Neurological: Negative for dizziness, syncope and numbness.   Hematological: Does not bruise/bleed easily.   Psychiatric/Behavioral: Negative for dysphoric mood and sleep disturbance. The patient is not nervous/anxious.    All other systems reviewed and are negative.          Objective:  There were no vitals filed for this visit.  There were no vitals filed for this visit.  There is no height or weight on file to calculate BMI.  General: No acute distress.  Resp: normal respiratory effort  Skin: no rashes or wounds; normal turgor  Psych: mood and affect appropriate; recent and remote memory intact         Ortho Exam    Right shoulder-active forward flexion 175°, active external rotation 45° compared to 50° on the left, 4+ out of 5 strength in all planes of motion, negative apprehension and relocation test.  All incisions well-healed.  Negative Little and Neer's.  Brisk cap refill all digits, 2+ radial pulse right wrist.    Imaging:    Assessment:       1. Status post arthroscopy of shoulder          Plan:          Discussed treatment options at length with patient at today's visit. Patient is cleared to return to full duty without restrictions this point time.  However I would not place him at maximal medical improvement yet supple make sure that his external rotation does improved, I will follow up with him in 6 weeks if he is progress well at that point time we will plan to place him at maximal  medical improvement at that time.  However he is still having issues we may consider a glenohumeral injection under ultrasound.    He is discharged from physical therapy, recommended home stretching program twice a day for external rotation.    Murray Regalado was in agreement with plan and had all questions answered.     Orders:  No orders of the defined types were placed in this encounter.      Medications:  No orders of the defined types were placed in this encounter.      Followup:  Return in about 6 weeks (around 5/22/2018).    Murray was seen today for follow-up and pain.    Diagnoses and all orders for this visit:    Status post arthroscopy of shoulder          Dictated utilizing Dragon dictation

## 2018-05-10 ENCOUNTER — OFFICE VISIT (OUTPATIENT)
Dept: ORTHOPEDIC SURGERY | Facility: CLINIC | Age: 23
End: 2018-05-10

## 2018-05-10 DIAGNOSIS — Z98.890 STATUS POST ARTHROSCOPY OF SHOULDER: Primary | ICD-10-CM

## 2018-05-10 PROCEDURE — 99212 OFFICE O/P EST SF 10 MIN: CPT | Performed by: ORTHOPAEDIC SURGERY

## 2018-05-10 NOTE — PROGRESS NOTES
Subjective:     Patient ID: Murray Regalado is a 23 y.o. male.    Chief Complaint:  F/u s/p right shoulder anterior, posterior labral repair  DOS 11/15/2017  History of Present Illness  Murray Regalado returns to clinic today for evaluation of right shoulder, doing very well this point time, has returned to full duty at work and is doing well with his shoulder.  Notes minimal irritation with repetitive overhead activities but otherwise is tolerating all regular activities well.  Denies any recurrent feeling of instability or locking or catching of the shoulder.     Social History     Occupational History   •  [employer]     Social History Main Topics   • Smoking status: Never Smoker   • Smokeless tobacco: Never Used   • Alcohol use 1.2 oz/week     2 Cans of beer per week      Comment: social   • Drug use: No   • Sexual activity: Defer      Past Medical History:   Diagnosis Date   • H/O Bell's palsy     2015, RESOLVED WITH STEROIDS   • Hypertension    • Lactose intolerance    • Right shoulder pain     SCHEDULED FOR SURGERY   • Shoulder injury     right     Past Surgical History:   Procedure Laterality Date   • SHOULDER ARTHROSCOPY Right 11/15/2017    Procedure: SHOULDER ARTHROSCOPY, labral repair, and all associated procedures;  Surgeon: Negro Martinez MD;  Location: [facility];  Service:    • TONSILLECTOMY     • TOOTH EXTRACTION  05/2017       Family History   Problem Relation Age of Onset   • Diabetes Mother    • COPD Mother      smoker   • Hypertension Father    • Lung cancer Father    • Alcohol abuse Brother          Review of Systems   Constitutional: Negative for chills, diaphoresis, fever and unexpected weight change.   HENT: Negative for hearing loss, nosebleeds, sore throat and tinnitus.    Eyes: Negative for pain and visual disturbance.   Respiratory: Negative for cough, shortness of breath and wheezing.    Cardiovascular: Negative for chest pain and palpitations.   Gastrointestinal: Negative  for abdominal pain, diarrhea, nausea and vomiting.   Endocrine: Negative for cold intolerance, heat intolerance and polydipsia.   Genitourinary: Negative for difficulty urinating, dysuria and hematuria.   Musculoskeletal: Positive for arthralgias. Negative for joint swelling and myalgias.   Skin: Negative for rash and wound.   Allergic/Immunologic: Negative for environmental allergies.   Neurological: Negative for dizziness, syncope and numbness.   Hematological: Does not bruise/bleed easily.   Psychiatric/Behavioral: Negative for dysphoric mood and sleep disturbance. The patient is not nervous/anxious.    All other systems reviewed and are negative.          Objective:  There were no vitals filed for this visit.  There were no vitals filed for this visit.  There is no height or weight on file to calculate BMI.  General: No acute distress.  Resp: normal respiratory effort  Skin: no rashes or wounds; normal turgor  Psych: mood and affect appropriate; recent and remote memory intact         Ortho Exam    Right shoulder-active forward flexion 175°, external rotation 45° and right compared to 50 on the left, 4+ out of 5 strength in all planes of motion negative apprehension and relocation test, no joint line pain.  Incisions well-healed.    Imaging:    Assessment:       1. Status post arthroscopy of shoulder          Plan:          Discussed treatment options at length with patient at today's visit. Patient is doing very well this point time, he has not maximal medical improvement, follow-up as needed should he have recurrent issues.  Recommended continue aggressive stretching over the next 6-12 months to try to obtain symmetric external rotation motion.    Murray Fabricio was in agreement with plan and had all questions answered.     Orders:  No orders of the defined types were placed in this encounter.      Medications:  No orders of the defined types were placed in this encounter.      Followup:  Return if symptoms  worsen or fail to improve.    Murray was seen today for follow-up and pain.    Diagnoses and all orders for this visit:    Status post arthroscopy of shoulder          Dictated utilizing Dragon dictation

## 2018-07-05 DIAGNOSIS — I10 ESSENTIAL HYPERTENSION: ICD-10-CM

## 2018-07-05 RX ORDER — LISINOPRIL AND HYDROCHLOROTHIAZIDE 12.5; 1 MG/1; MG/1
TABLET ORAL
Qty: 90 TABLET | Refills: 0 | Status: SHIPPED | OUTPATIENT
Start: 2018-07-05 | End: 2018-10-01 | Stop reason: SDUPTHER

## 2018-08-08 ENCOUNTER — LAB REQUISITION (OUTPATIENT)
Dept: LAB | Facility: OTHER | Age: 23
End: 2018-08-08

## 2018-08-08 DIAGNOSIS — Z00.00 ANNUAL PHYSICAL EXAM: ICD-10-CM

## 2018-08-08 LAB
ABO GROUP BLD: NORMAL
ALBUMIN SERPL-MCNC: 5 G/DL (ref 3.5–5.2)
ALP SERPL-CCNC: 96 U/L (ref 39–117)
ALT SERPL W P-5'-P-CCNC: 27 U/L (ref 1–41)
AST SERPL-CCNC: 13 U/L (ref 1–40)
BASOPHILS # BLD AUTO: 0.05 10*3/MM3 (ref 0–0.2)
BASOPHILS NFR BLD AUTO: 0.6 % (ref 0–1.5)
BILIRUB CONJ SERPL-MCNC: <0.2 MG/DL (ref 0–0.3)
BILIRUB SERPL-MCNC: 0.8 MG/DL (ref 0.1–1.2)
BILIRUB UR QL STRIP: NEGATIVE
BUN BLD-MCNC: 14 MG/DL (ref 6–20)
CALCIUM SPEC-SCNC: 10.3 MG/DL (ref 8.6–10.5)
CHLORIDE SERPL-SCNC: 98 MMOL/L (ref 98–107)
CHOLEST SERPL-MCNC: 177 MG/DL (ref 0–200)
CLARITY UR: CLEAR
CO2 SERPL-SCNC: 27.1 MMOL/L (ref 22–29)
COLOR UR: YELLOW
CREAT BLD-MCNC: 0.96 MG/DL (ref 0.76–1.27)
DEPRECATED RDW RBC AUTO: 42.8 FL (ref 37–54)
EOSINOPHIL # BLD AUTO: 0.23 10*3/MM3 (ref 0–0.7)
EOSINOPHIL NFR BLD AUTO: 2.5 % (ref 0.3–6.2)
ERYTHROCYTE [DISTWIDTH] IN BLOOD BY AUTOMATED COUNT: 12.6 % (ref 11.5–14.5)
GFR SERPL CREATININE-BSD FRML MDRD: 95 ML/MIN/1.73
GGT SERPL-CCNC: 33 U/L (ref 8–61)
GLUCOSE BLD-MCNC: 106 MG/DL (ref 65–99)
GLUCOSE UR STRIP-MCNC: NEGATIVE MG/DL
HCT VFR BLD AUTO: 46.1 % (ref 40.4–52.2)
HDLC SERPL-MCNC: 31 MG/DL (ref 40–60)
HGB BLD-MCNC: 15.8 G/DL (ref 13.7–17.6)
HGB UR QL STRIP.AUTO: NEGATIVE
IMM GRANULOCYTES # BLD: 0.01 10*3/MM3 (ref 0–0.03)
IMM GRANULOCYTES NFR BLD: 0.1 % (ref 0–0.5)
IRON 24H UR-MRATE: 152 MCG/DL (ref 59–158)
KETONES UR QL STRIP: NEGATIVE
LDH SERPL-CCNC: 248 U/L (ref 135–225)
LDLC SERPL CALC-MCNC: 109 MG/DL (ref 0–100)
LDLC/HDLC SERPL: 3.52 {RATIO}
LEUKOCYTE ESTERASE UR QL STRIP.AUTO: NEGATIVE
LYMPHOCYTES # BLD AUTO: 2.22 10*3/MM3 (ref 0.9–4.8)
LYMPHOCYTES NFR BLD AUTO: 24.6 % (ref 19.6–45.3)
MCH RBC QN AUTO: 31.8 PG (ref 27–32.7)
MCHC RBC AUTO-ENTMCNC: 34.3 G/DL (ref 32.6–36.4)
MCV RBC AUTO: 92.8 FL (ref 79.8–96.2)
MONOCYTES # BLD AUTO: 0.6 10*3/MM3 (ref 0.2–1.2)
MONOCYTES NFR BLD AUTO: 6.6 % (ref 5–12)
NEUTROPHILS # BLD AUTO: 5.94 10*3/MM3 (ref 1.9–8.1)
NEUTROPHILS NFR BLD AUTO: 65.7 % (ref 42.7–76)
NITRITE UR QL STRIP: NEGATIVE
PH UR STRIP.AUTO: 7.5 [PH] (ref 5–8)
PHOSPHATE SERPL-MCNC: 2.8 MG/DL (ref 2.5–4.5)
PLATELET # BLD AUTO: 203 10*3/MM3 (ref 140–500)
PMV BLD AUTO: 12.1 FL (ref 6–12)
POTASSIUM BLD-SCNC: 4.4 MMOL/L (ref 3.5–5.2)
PROT SERPL-MCNC: 7.7 G/DL (ref 6–8.5)
PROT UR QL STRIP: NEGATIVE
PSA SERPL-MCNC: 0.26 NG/ML (ref 0–4)
RBC # BLD AUTO: 4.97 10*6/MM3 (ref 4.6–6)
RH BLD: POSITIVE
SODIUM BLD-SCNC: 139 MMOL/L (ref 136–145)
SP GR UR STRIP: 1.01 (ref 1–1.03)
TRIGL SERPL-MCNC: 185 MG/DL (ref 0–150)
URATE SERPL-MCNC: 7.8 MG/DL (ref 3.4–7)
UROBILINOGEN UR QL STRIP: NORMAL
VLDLC SERPL-MCNC: 37 MG/DL (ref 5–40)
WBC NRBC COR # BLD: 9.04 10*3/MM3 (ref 4.5–10.7)

## 2018-08-08 PROCEDURE — 85025 COMPLETE CBC W/AUTO DIFF WBC: CPT | Performed by: PREVENTIVE MEDICINE

## 2018-08-08 PROCEDURE — 86901 BLOOD TYPING SEROLOGIC RH(D): CPT | Performed by: PREVENTIVE MEDICINE

## 2018-08-08 PROCEDURE — 81003 URINALYSIS AUTO W/O SCOPE: CPT | Performed by: PREVENTIVE MEDICINE

## 2018-08-08 PROCEDURE — 86900 BLOOD TYPING SEROLOGIC ABO: CPT | Performed by: PREVENTIVE MEDICINE

## 2018-08-08 PROCEDURE — 86481 TB AG RESPONSE T-CELL SUSP: CPT | Performed by: PREVENTIVE MEDICINE

## 2018-08-08 PROCEDURE — 80053 COMPREHEN METABOLIC PANEL: CPT | Performed by: PREVENTIVE MEDICINE

## 2018-08-08 PROCEDURE — 80061 LIPID PANEL: CPT | Performed by: PREVENTIVE MEDICINE

## 2018-08-08 PROCEDURE — 84153 ASSAY OF PSA TOTAL: CPT | Performed by: PREVENTIVE MEDICINE

## 2018-08-10 LAB
TSPOT INTERPRETATION: NEGATIVE
TSPOT NIL CONTROL INTERPRETATION: NORMAL
TSPOT PANEL A: 1
TSPOT PANEL B: 0
TSPOT POS CONTROL INTERPRETATION: NORMAL

## 2018-10-01 DIAGNOSIS — I10 ESSENTIAL HYPERTENSION: ICD-10-CM

## 2018-10-01 RX ORDER — LISINOPRIL AND HYDROCHLOROTHIAZIDE 12.5; 1 MG/1; MG/1
TABLET ORAL
Qty: 30 TABLET | Refills: 0 | Status: SHIPPED | OUTPATIENT
Start: 2018-10-01 | End: 2019-04-24 | Stop reason: SINTOL

## 2018-12-11 ENCOUNTER — TRANSCRIBE ORDERS (OUTPATIENT)
Dept: PHYSICAL THERAPY | Facility: CLINIC | Age: 23
End: 2018-12-11

## 2018-12-11 DIAGNOSIS — M54.40 ACUTE RIGHT-SIDED LOW BACK PAIN WITH SCIATICA, SCIATICA LATERALITY UNSPECIFIED: Primary | ICD-10-CM

## 2018-12-13 ENCOUNTER — TREATMENT (OUTPATIENT)
Dept: PHYSICAL THERAPY | Facility: CLINIC | Age: 23
End: 2018-12-13

## 2018-12-13 DIAGNOSIS — S39.012D STRAIN OF LUMBAR REGION, SUBSEQUENT ENCOUNTER: Primary | ICD-10-CM

## 2018-12-13 PROCEDURE — 97110 THERAPEUTIC EXERCISES: CPT | Performed by: PHYSICAL THERAPIST

## 2018-12-13 PROCEDURE — 97014 ELECTRIC STIMULATION THERAPY: CPT | Performed by: PHYSICAL THERAPIST

## 2018-12-13 PROCEDURE — 97161 PT EVAL LOW COMPLEX 20 MIN: CPT | Performed by: PHYSICAL THERAPIST

## 2018-12-13 NOTE — PROGRESS NOTES
Physical Therapy Initial Evaluation and Plan of Care    Patient: Murray Regalado   : 1995  Diagnosis/ICD-10 Code:  No primary diagnosis found.  Referring practitioner: MIKALA Mcginnis    Subjective Evaluation    History of Present Illness  Onset date: 12/3/2018.  Mechanism of injury: Pt is a 22 y/o WM who reports to the clinic with right LBP and into right buttocks after lifting a 250 lb stretcher with a 140 pt into the ambulance.  Pt reports he had increased pain after getting out of the truck had immediate pain run down the back of his leg-went to Urgent care-prescribed Ibuprofen and muscle relaxer-followed up with MD one week later- she did x-ray-negative-remain off work-referred to PT.  Pt states he has not had any prior injuries to his back.        Patient Occupation: full time EMS, part time [employer]  Quality of life: good    Pain  No pain reported  Current pain ratin  At worst pain ratin  Location: right low back and into buttock   Quality: sharp and dull ache (N/T into intermittent started last week, shooting )  Relieving factors: heat and change in position  Aggravating factors: standing, prolonged positioning, squatting, lifting and ambulation (standing longer than 30-45 minutes )    Hand dominance: right    Diagnostic Tests  X-ray: normal (per pt negative-did not hear back from MD, so assumed everything was okay.  )    Treatments  Current treatment: medication  Current treatment comments: torodal shot at Urgent care .     Patient Goals  Patient goals for therapy: decreased pain, increased strength and return to work             Objective       Postural Observations  Seated posture: good  Standing posture: good    Additional Postural Observation Details  Posture: level iliac crest  WNL lumbar lordosis     Palpation   Left   Tenderness of the lumbar paraspinals.     Right Tenderness of the lumbar paraspinals.     Additional Palpation Details  Tenderness B L-S PS   L>R   ERS right L3-L5      Tenderness     Left Hip   Tenderness in the PSIS.     Additional Tenderness Details  Pt tender intervertebral space L2-L3     Neurological Testing     Sensation     Lumbar   Left   Intact: light touch    Right   Intact: light touch    Reflexes   Left   Patellar (L4): trace (1+)  Achilles (S1): absent (0)    Right   Patellar (L4): trace (1+)  Achilles (S1): absent (0)    Active Range of Motion     Lumbar   Flexion: WFL  Extension: 50 degrees with pain  Left lateral flexion: WFL  Right lateral flexion: 50 degrees with pain  Left rotation: WFL  Right rotation: WFL    Strength/Myotome Testing     Left Hip   Planes of Motion   Flexion: 5    Right Hip   Planes of Motion   Flexion: 4+    Left Knee   Flexion: 5  Extension: 5    Right Knee   Flexion: 5  Extension: 5    Left Ankle/Foot   Dorsiflexion: 5  Plantar flexion: 5  Eversion: 5  Great toe extension: 5    Right Ankle/Foot   Dorsiflexion: 5  Plantar flexion: 5  Eversion: 5  Great toe extension: 5    Additional Strength Details  Abdominal strength 4+/5  Trunk Ext 5/5     Tests     Lumbar     Left   Negative crossed SLR and passive SLR.     Right   Positive crossed SLR and passive SLR.     Left Hip   Negative Ely's, ROSALIA, long sit and piriformis.   90/90 SLR: Positive.   SLR: Positive.     Right Hip   Positive piriformis.   Negative Ely's, ROSALIA and long sit.   90/90 SLR: Positive.   SLR: Positive.     Additional Tests Details  Pt with pain into lower back and hip, but not shooting down into the leg          Assessment & Plan     Assessment  Impairments: abnormal or restricted ROM, impaired physical strength, lacks appropriate home exercise program and pain with function  Assessment details: Pt is a 22 y/o WM who reports to the clinic with LBP, tenderness, L-S facet dysfunction, decreased flexibility, and decreased functional mobility secondary to having increased LBP with position changes and prolonged positions.    Prognosis: good  Functional Limitations: carrying  objects, lifting, walking, uncomfortable because of pain and standing  Goals  Plan Goals: STGs: 2 weeks  1.  Decrease LBP to a 5/10 at it's worst  2.  Increase lumbar flex, ext, and right SB to WNL    3.  Decrease right lumbar PS and L-S area tenderness to minimal with palpation.  4.  Increase B hamstring flexibility to WNL with the SLR test   5.  Increase pt's tolerance to standing and sitting to at least 60 sec with < or = 5/10 LBP.      LTGs: 4 weeks  1.  Decrease LBP to a 2/10 at it's worst  2.  Restore normal lumbar facet alignment at L3-L5 without needing MET for at least 2 weeks.    3.  Increase right piriformis flexibility to WNL  4.  Pt is independent with HEP  5.  Pt is able to complete his normal daily and work routine with < or = 2/10 LBP.        Plan  Therapy options: will be seen for skilled physical therapy services  Planned modality interventions: cryotherapy, electrical stimulation/Finnish stimulation, ultrasound and thermotherapy (hydrocollator packs)  Planned therapy interventions: abdominal trunk stabilization, flexibility, functional ROM exercises, home exercise program, stretching, strengthening, spinal/joint mobilization and therapeutic activities  Duration in visits: 8  Treatment plan discussed with: patient        Manual Therapy:         mins  27938;  Therapeutic Exercise:   20      mins  83857;     Neuromuscular Logan:        mins  17171;    Therapeutic Activity:          mins  66007;     Gait Training:           mins  37770;     Ultrasound:          mins  46030;    Electrical Stimulation:    15     mins  84797 ( );  Dry Needling          mins self-pay    Timed Treatment:  20    mins   Total Treatment:    54    mins    PT SIGNATURE: Adele Carpio PT   DATE TREATMENT INITIATED: 12/13/2018    Initial Certification  Certification Period: 3/13/2019  I certify that the therapy services are furnished while this patient is under my care.  The services outlined above are required by this  patient, and will be reviewed every 90 days.     PHYSICIAN: Tierra Daigle, APRN      DATE:     Please sign and return via fax to [phone].. Thank you, [facility].

## 2018-12-18 ENCOUNTER — TREATMENT (OUTPATIENT)
Dept: PHYSICAL THERAPY | Facility: CLINIC | Age: 23
End: 2018-12-18

## 2018-12-18 DIAGNOSIS — S39.012D STRAIN OF LUMBAR REGION, SUBSEQUENT ENCOUNTER: Primary | ICD-10-CM

## 2018-12-18 PROCEDURE — 97110 THERAPEUTIC EXERCISES: CPT | Performed by: PHYSICAL THERAPIST

## 2018-12-18 PROCEDURE — 97014 ELECTRIC STIMULATION THERAPY: CPT | Performed by: PHYSICAL THERAPIST

## 2018-12-18 NOTE — PROGRESS NOTES
Physical Therapy Daily Progress Note    Visit # : 2  Murray Regalado reports: his back is feeling better and had a two day relief of pain after last visit.  Pt saw the MD yesterday and she said to continue PT and that he may be able to return to work after next week.    Subjective     Objective   See Exercise, Manual, and Modality Logs for complete treatment.   Pt presents with a ERS R L3-L4      Assessment & Plan     Assessment  Assessment details: Pt is responding to treatment well with decreasing pain and improving lumbar facet alignment.  Pt able to add stabilization exercises without increased pain.  Will continue to see for strengthening, stretching and modalities PRN for pain control.          Progress per Plan of Care           Manual Therapy:         mins  07075;  Therapeutic Exercise:   30      mins  75059;     Neuromuscular Logan:        mins  62875;    Therapeutic Activity:          mins  05984;     Gait Training:           mins  24118;     Ultrasound:          mins  84901;    Electrical Stimulation:   15      mins  62902 ( );  Dry Needling          mins self-pay    Timed Treatment:  30    mins   Total Treatment:     57   mins    Adele Carpio, PT  Physical Therapist

## 2018-12-19 ENCOUNTER — TREATMENT (OUTPATIENT)
Dept: PHYSICAL THERAPY | Facility: CLINIC | Age: 23
End: 2018-12-19

## 2018-12-19 DIAGNOSIS — S39.012D STRAIN OF LUMBAR REGION, SUBSEQUENT ENCOUNTER: Primary | ICD-10-CM

## 2018-12-19 PROCEDURE — 97014 ELECTRIC STIMULATION THERAPY: CPT | Performed by: PHYSICAL THERAPIST

## 2018-12-19 PROCEDURE — 97110 THERAPEUTIC EXERCISES: CPT | Performed by: PHYSICAL THERAPIST

## 2018-12-19 PROCEDURE — 97140 MANUAL THERAPY 1/> REGIONS: CPT | Performed by: PHYSICAL THERAPIST

## 2018-12-19 NOTE — PROGRESS NOTES
Physical Therapy Daily Progress Note    Visit # : 3  Murray Regalado reports: feeling better today; rates pain as 2/10 along R iliolumbar region.      Subjective     Objective   See Exercise, Manual, and Modality Logs for complete treatment.   Normal L-S alignment with trunk flex/ext  Palp tenderness R QL/erector spinae.      Assessment/Plan  Good tolerance to exercise progression.  Pt presents with good facet alignment and is responding favorably to treatment.   Progress strengthening /stabilization /functional activity  Consider standing shoulder extension/abd bracing with TB if tolerated.          Manual Therapy:    8     mins  15422;  Therapeutic Exercise:    20     mins  57261;     Neuromuscular Logan:    -    mins  11017;    Therapeutic Activity:     -     mins  14638;     Gait Training:      -     mins  11163;     Ultrasound:     -     mins  95456;    Electrical Stimulation:    15     mins  73421 ( );  Iontophoresis                 -     mins 39832      Timed Treatment:   28   mins   Total Treatment:     45   mins        Elizabeth Padgett PT  Physical Therapist  [place] License # [id number]

## 2018-12-21 ENCOUNTER — TREATMENT (OUTPATIENT)
Dept: PHYSICAL THERAPY | Facility: CLINIC | Age: 23
End: 2018-12-21

## 2018-12-21 DIAGNOSIS — S39.012D STRAIN OF LUMBAR REGION, SUBSEQUENT ENCOUNTER: Primary | ICD-10-CM

## 2018-12-21 PROCEDURE — 97014 ELECTRIC STIMULATION THERAPY: CPT | Performed by: PHYSICAL THERAPIST

## 2018-12-21 PROCEDURE — 97110 THERAPEUTIC EXERCISES: CPT | Performed by: PHYSICAL THERAPIST

## 2018-12-21 NOTE — PROGRESS NOTES
Physical Therapy Daily Progress Note    Visit # : 4  Murray Regalado reports: his back is feeling good.  States he went to the gym last night and walked 3 miles without pain.      Subjective     Objective       Tenderness     Additional Tenderness Details  Pt with good lumbar facet alignment without needing MET      See Exercise, Manual, and Modality Logs for complete treatment.       Assessment & Plan     Assessment  Assessment details: Pt is doing well with good lumbar facet alignment without needing MET.  Pt maintaining alignment for one week.  Pt tolerating all stabilization exercises without increased pain.  Will continue to see one more week with possible discharge soon.          Progress per Plan of Care           Manual Therapy:         mins  35868;  Therapeutic Exercise:   32      mins  05003;     Neuromuscular Logan:        mins  71181;    Therapeutic Activity:          mins  99859;     Gait Training:           mins  68994;     Ultrasound:          mins  84502;    Electrical Stimulation:   15      mins  27931 ( );  Dry Needling          mins self-pay    Timed Treatment:   32   mins   Total Treatment:    51   mins    Adele Carpio, PT  Physical Therapist

## 2018-12-26 ENCOUNTER — TREATMENT (OUTPATIENT)
Dept: PHYSICAL THERAPY | Facility: CLINIC | Age: 23
End: 2018-12-26

## 2018-12-26 DIAGNOSIS — S39.012D STRAIN OF LUMBAR REGION, SUBSEQUENT ENCOUNTER: Primary | ICD-10-CM

## 2018-12-26 PROCEDURE — 97110 THERAPEUTIC EXERCISES: CPT | Performed by: PHYSICAL THERAPIST

## 2018-12-26 PROCEDURE — 97014 ELECTRIC STIMULATION THERAPY: CPT | Performed by: PHYSICAL THERAPIST

## 2018-12-26 NOTE — PROGRESS NOTES
Physical Therapy Daily Progress Note    Visit # : 5  Murray Regalado reports: his back is feeling good and denies having any increased pain since last visit.      Subjective     Objective       Palpation     Additional Palpation Details  Pt tender to palption over right lumbar PS at approximately L3-L4       See Exercise, Manual, and Modality Logs for complete treatment.       Assessment & Plan     Assessment  Assessment details: Pt is tolerating treatment well with good L-S alignment after MET.  Pt able to do all stretches and stabilization exercises without increased back pain.   Will continue to see one more week with possible discharge soon.          Progress per Plan of Care           Manual Therapy:         mins  15895;  Therapeutic Exercise:   35      mins  82099;     Neuromuscular Logan:        mins  34592;    Therapeutic Activity:          mins  85383;     Gait Training:           mins  17883;     Ultrasound:          mins  18547;    Electrical Stimulation:  15       mins  13718 ( );  Dry Needling          mins self-pay    Timed Treatment:   35   mins   Total Treatment:     58   mins    Adele Carpio, PT  Physical Therapist

## 2018-12-28 ENCOUNTER — TREATMENT (OUTPATIENT)
Dept: PHYSICAL THERAPY | Facility: CLINIC | Age: 23
End: 2018-12-28

## 2018-12-28 DIAGNOSIS — S39.012D STRAIN OF LUMBAR REGION, SUBSEQUENT ENCOUNTER: Primary | ICD-10-CM

## 2018-12-28 PROCEDURE — 97014 ELECTRIC STIMULATION THERAPY: CPT | Performed by: PHYSICAL THERAPIST

## 2018-12-28 PROCEDURE — 97110 THERAPEUTIC EXERCISES: CPT | Performed by: PHYSICAL THERAPIST

## 2018-12-28 NOTE — PROGRESS NOTES
Physical Therapy Progress Note  12/28/2018  Tierra Daigle, APRN    Re: Murray Regalado    ______________________________________________________________    Mr. Murray Regalado, Patient seen for 6 sessions.  Treatment has consisted of: stretches, stabilization exercises, muscle energy to correct lumbar facet alignment, instructed in a HEP, and ended treatment with IFC with cold packs and hot packs.       S: Mr. Murray Regalado states: his low back pain has been a 0/10 over the last 5 days.      Subjective     Objective       Tenderness     Additional Tenderness Details  Pt with good lumbar facet alignment without requiring MET   Pt denies point tenderness over the B L-S area with palpation     Active Range of Motion     Lumbar   Flexion: WFL  Extension: WFL  Left lateral flexion: WFL  Right lateral flexion: WFL  Left rotation: WFL  Right rotation: WFL    Additional Active Range of Motion Details  Pt with good lumbar AROM all planes without increased pain     Tests     Lumbar     Left   Negative crossed SLR and passive SLR.     Right   Negative crossed SLR and passive SLR.     Left Hip   Negative piriformis.   90/90 SLR: Negative.   SLR: Negative.     Right Hip   Negative piriformis.   90/90 SLR: Negative.  SLR: Negative.      See Exercise, Manual, and Modality Logs for complete treatment.       Assessment & Plan     Assessment  Assessment details: Pt is doing well with therapy.  Pt with good lumbar facet alignment and good lumbar AROM all planes without pain.  Pt with improved B hamstring and piriformis flexibility to WNL.  Pt has accomplished all STGs and 3/5 LTGs. Will continue to see pt one more week prior to returning to MD.          Progress per Plan of Care           Manual Therapy:         mins  71458;  Therapeutic Exercise:   30      mins  96478;     Neuromuscular Logan:        mins  38313;    Therapeutic Activity:          mins  45260;     Gait Training:           mins  38481;     Ultrasound:           mins  12513;    Electrical Stimulation:   15      mins  34738 ( );  Dry Needling          mins self-pay    Timed Treatment:  30    mins   Total Treatment:    60    mins    Adele Carpio, PT  Physical Therapist

## 2019-04-24 ENCOUNTER — OFFICE VISIT (OUTPATIENT)
Dept: FAMILY MEDICINE CLINIC | Facility: CLINIC | Age: 24
End: 2019-04-24

## 2019-04-24 VITALS
HEART RATE: 79 BPM | HEIGHT: 75 IN | DIASTOLIC BLOOD PRESSURE: 84 MMHG | OXYGEN SATURATION: 97 % | WEIGHT: 303 LBS | SYSTOLIC BLOOD PRESSURE: 136 MMHG | BODY MASS INDEX: 37.67 KG/M2

## 2019-04-24 DIAGNOSIS — J45.30 MILD PERSISTENT REACTIVE AIRWAY DISEASE WITHOUT COMPLICATION: ICD-10-CM

## 2019-04-24 DIAGNOSIS — I10 ESSENTIAL HYPERTENSION: Primary | ICD-10-CM

## 2019-04-24 PROBLEM — J45.909 REACTIVE AIRWAY DISEASE: Status: ACTIVE | Noted: 2019-04-24

## 2019-04-24 PROCEDURE — 99213 OFFICE O/P EST LOW 20 MIN: CPT | Performed by: FAMILY MEDICINE

## 2019-04-24 RX ORDER — LISINOPRIL 20 MG/1
20 TABLET ORAL DAILY
Qty: 90 TABLET | Refills: 3 | Status: SHIPPED | OUTPATIENT
Start: 2019-04-24 | End: 2020-06-28

## 2019-04-24 RX ORDER — FLUTICASONE PROPIONATE 44 UG/1
1 AEROSOL, METERED RESPIRATORY (INHALATION) NIGHTLY
Qty: 1 INHALER | Refills: 11 | Status: SHIPPED | OUTPATIENT
Start: 2019-04-24

## 2019-04-24 NOTE — PROGRESS NOTES
Subjective   Murray Regalado is a 24 y.o. male who is here for   Chief Complaint   Patient presents with   • Breathing Problem     Fit test on Physical as     .     History of Present Illness   Had mildly low FEV1 on his PFTs at [facility] as a   (we scanned in his PFT reports)  Non smoker  Was on albuterol as a kid  He can do 90% of the work needed  But get SOA with extended heavy loads.    Is getting lightheaded on hot sweaty days, wants to cut out the HCTZ.      The following portions of the patient's history were reviewed and updated as appropriate: allergies, current medications, past family history, past medical history, past social history, past surgical history and problem list.    Review of Systems   Constitutional: Negative for fatigue.   Respiratory: Positive for shortness of breath. Negative for apnea, cough, choking, chest tightness, wheezing and stridor.    Cardiovascular: Negative for chest pain, palpitations and leg swelling.       Objective   Physical Exam   Constitutional: He appears well-developed and well-nourished.   Cardiovascular: Normal rate.   Pulmonary/Chest: Effort normal.   Neurological: He is alert.   Nursing note and vitals reviewed.      Assessment/Plan   Murray was seen today for breathing problem.    Diagnoses and all orders for this visit:    Essential hypertension  -     lisinopril (PRINIVIL,ZESTRIL) 20 MG tablet; Take 1 tablet by mouth Daily.  -     Treadmill Stress Test; Future    Mild persistent reactive airway disease without complication  -     fluticasone (FLOVENT HFA) 44 MCG/ACT inhaler; Inhale 1 puff Every Night.  -     Treadmill Stress Test; Future      There are no Patient Instructions on file for this visit.    Medications Discontinued During This Encounter   Medication Reason   • lisinopril-hydrochlorothiazide (PRINZIDE,ZESTORETIC) 10-12.5 MG per tablet Side effects        Return in about 6 months (around 10/24/2019) for blood pressure, new  medication follow up.    Dr. Son Hwang  [place], [place]

## 2019-04-30 ENCOUNTER — HOSPITAL ENCOUNTER (OUTPATIENT)
Dept: CARDIOLOGY | Facility: HOSPITAL | Age: 24
Discharge: HOME | End: 2019-04-30
Admitting: FAMILY MEDICINE

## 2019-04-30 DIAGNOSIS — I10 ESSENTIAL HYPERTENSION: ICD-10-CM

## 2019-04-30 DIAGNOSIS — J45.30 MILD PERSISTENT REACTIVE AIRWAY DISEASE WITHOUT COMPLICATION: ICD-10-CM

## 2019-04-30 LAB
BH CV STRESS BP STAGE 1: NORMAL
BH CV STRESS BP STAGE 2: NORMAL
BH CV STRESS BP STAGE 3: NORMAL
BH CV STRESS BP STAGE 4: NORMAL
BH CV STRESS DURATION MIN STAGE 1: 3
BH CV STRESS DURATION MIN STAGE 2: 3
BH CV STRESS DURATION MIN STAGE 3: 3
BH CV STRESS DURATION SEC STAGE 1: 0
BH CV STRESS DURATION SEC STAGE 2: 0
BH CV STRESS DURATION SEC STAGE 3: 0
BH CV STRESS DURATION SEC STAGE 4: 55
BH CV STRESS GRADE STAGE 1: 10
BH CV STRESS GRADE STAGE 2: 12
BH CV STRESS GRADE STAGE 3: 14
BH CV STRESS GRADE STAGE 4: 16
BH CV STRESS HR STAGE 1: 117
BH CV STRESS HR STAGE 2: 135
BH CV STRESS HR STAGE 3: 164
BH CV STRESS HR STAGE 4: 171
BH CV STRESS METS STAGE 1: 5
BH CV STRESS METS STAGE 2: 7.5
BH CV STRESS METS STAGE 3: 10
BH CV STRESS METS STAGE 4: 11.7
BH CV STRESS PROTOCOL 1: NORMAL
BH CV STRESS RECOVERY BP: NORMAL MMHG
BH CV STRESS RECOVERY HR: 119 BPM
BH CV STRESS SPEED STAGE 1: 1.7
BH CV STRESS SPEED STAGE 2: 2.5
BH CV STRESS SPEED STAGE 3: 3.4
BH CV STRESS SPEED STAGE 4: 4.2
BH CV STRESS STAGE 1: 1
BH CV STRESS STAGE 2: 2
BH CV STRESS STAGE 3: 3
BH CV STRESS STAGE 4: 4
MAXIMAL PREDICTED HEART RATE: 194 BPM
PERCENT MAX PREDICTED HR: 90.72 %
STRESS BASELINE BP: NORMAL MMHG
STRESS BASELINE HR: 86 BPM
STRESS O2 SAT REST: 100 %
STRESS PERCENT HR: 107 %
STRESS POST ESTIMATED WORKLOAD: 11.7 METS
STRESS POST EXERCISE DUR MIN: 9 MIN
STRESS POST EXERCISE DUR SEC: 55 SEC
STRESS POST PEAK BP: NORMAL MMHG
STRESS POST PEAK HR: 176 BPM
STRESS TARGET HR: 165 BPM

## 2019-04-30 PROCEDURE — 93016 CV STRESS TEST SUPVJ ONLY: CPT | Performed by: INTERNAL MEDICINE

## 2019-04-30 PROCEDURE — 93018 CV STRESS TEST I&R ONLY: CPT | Performed by: INTERNAL MEDICINE

## 2019-04-30 PROCEDURE — 93017 CV STRESS TEST TRACING ONLY: CPT

## 2019-05-17 ENCOUNTER — LAB REQUISITION (OUTPATIENT)
Dept: LAB | Facility: OTHER | Age: 24
End: 2019-05-17

## 2019-05-17 DIAGNOSIS — Z77.21 EXPOSURE TO BLOOD OR BODY FLUID: ICD-10-CM

## 2019-05-17 LAB
HBV SURFACE AB SER RIA-ACNC: REACTIVE
HBV SURFACE AG SERPL QL IA: NORMAL
HCV AB SER DONR QL: NORMAL
HIV1+2 AB SER QL: NORMAL

## 2019-05-17 PROCEDURE — 86803 HEPATITIS C AB TEST: CPT | Performed by: PREVENTIVE MEDICINE

## 2019-05-17 PROCEDURE — 87340 HEPATITIS B SURFACE AG IA: CPT | Performed by: PREVENTIVE MEDICINE

## 2019-05-17 PROCEDURE — 86706 HEP B SURFACE ANTIBODY: CPT | Performed by: PREVENTIVE MEDICINE

## 2019-05-17 PROCEDURE — G0432 EIA HIV-1/HIV-2 SCREEN: HCPCS | Performed by: PREVENTIVE MEDICINE

## 2019-06-28 ENCOUNTER — LAB REQUISITION (OUTPATIENT)
Dept: LAB | Facility: OTHER | Age: 24
End: 2019-06-28

## 2019-06-28 DIAGNOSIS — Z09 FOLLOW UP: ICD-10-CM

## 2019-06-28 LAB
HCV AB SER DONR QL: NORMAL
HIV1+2 AB SER QL: NORMAL

## 2019-06-28 PROCEDURE — G0432 EIA HIV-1/HIV-2 SCREEN: HCPCS | Performed by: PREVENTIVE MEDICINE

## 2019-06-28 PROCEDURE — 86803 HEPATITIS C AB TEST: CPT | Performed by: PREVENTIVE MEDICINE

## 2019-10-10 ENCOUNTER — LAB REQUISITION (OUTPATIENT)
Dept: LAB | Facility: OTHER | Age: 24
End: 2019-10-10

## 2019-10-10 DIAGNOSIS — Z20.1 EXPOSURE TO TB: ICD-10-CM

## 2019-10-10 PROCEDURE — 86481 TB AG RESPONSE T-CELL SUSP: CPT | Performed by: PHYSICAL MEDICINE & REHABILITATION

## 2019-10-12 LAB
TSPOT INTERPRETATION: NEGATIVE
TSPOT NIL CONTROL INTERPRETATION: NORMAL
TSPOT PANEL A: 0
TSPOT PANEL B: 0
TSPOT POS CONTROL INTERPRETATION: NORMAL

## 2019-11-13 NOTE — PROGRESS NOTES
Physical Therapy Daily Progress Note    Time In 9:15  Time Out 10:25    Visit # : 4  Murray Regalado reports: his shoulder is pretty sore, but still better than it was last week.      Subjective     Objective   See Exercise, Manual, and Modality Logs for complete treatment.       Assessment & Plan     Assessment  Assessment details: Pt tolerated treatment well, but a little more painful and tight during flexion PROM stretches.  Pt is progressing well through protocol and tolerating all exercises without difficulty.  Will continue to see 3x/week for stretching, strengthening, and modalities PRN.  Measure PROM next visit.          Progress per Plan of Care           Manual Therapy:    15     mins  82056;  Therapeutic Exercise:    20     mins  35438;     Neuromuscular Logan:        mins  73584;    Therapeutic Activity:    10      mins  43451;     Gait Training:           mins  24727;     Ultrasound:          mins  20777;    Electrical Stimulation:    15     mins  51046 ( );  Dry Needling          mins self-pay    Timed Treatment:  45    mins   Total Treatment:     70   mins    Adele Carpio, PT  Physical Therapist  
[FreeTextEntry1] : consent obtained\par safety precautions discussed.\par botox 200 U\par 5u per muscle site: \par procerusX1\par corrugatorX2\par frontalis X4\par temporalis X8\par occipitalisX6\par trapeziusX6\par 155 u total were injected \par 45 u were discarded.\par pt tolerated procedure well.\par \par

## 2020-03-19 ENCOUNTER — LAB REQUISITION (OUTPATIENT)
Dept: LAB | Facility: OTHER | Age: 25
End: 2020-03-19

## 2020-03-19 DIAGNOSIS — Z77.21 EXPOSURE TO BLOOD OR BODY FLUID: ICD-10-CM

## 2020-03-19 LAB
ALBUMIN SERPL-MCNC: 4.8 G/DL (ref 3.5–5.2)
ALBUMIN SERPL-MCNC: 4.8 G/DL (ref 3.5–5.2)
ALBUMIN/GLOB SERPL: 1.6 G/DL
ALP SERPL-CCNC: 93 U/L (ref 39–117)
ALP SERPL-CCNC: 93 U/L (ref 39–117)
ALT SERPL W P-5'-P-CCNC: 45 U/L (ref 1–41)
ALT SERPL W P-5'-P-CCNC: 45 U/L (ref 1–41)
ANION GAP SERPL CALCULATED.3IONS-SCNC: 15.5 MMOL/L (ref 5–15)
AST SERPL-CCNC: 32 U/L (ref 1–40)
AST SERPL-CCNC: 32 U/L (ref 1–40)
BILIRUB CONJ SERPL-MCNC: <0.2 MG/DL (ref 0.2–0.3)
BILIRUB INDIRECT SERPL-MCNC: ABNORMAL MG/DL
BILIRUB SERPL-MCNC: 0.3 MG/DL (ref 0.2–1.2)
BILIRUB SERPL-MCNC: 0.3 MG/DL (ref 0.2–1.2)
BUN BLD-MCNC: 21 MG/DL (ref 6–20)
BUN/CREAT SERPL: 15.9 (ref 7–25)
CALCIUM SPEC-SCNC: 9.5 MG/DL (ref 8.6–10.5)
CHLORIDE SERPL-SCNC: 97 MMOL/L (ref 98–107)
CO2 SERPL-SCNC: 21.5 MMOL/L (ref 22–29)
CREAT BLD-MCNC: 1.32 MG/DL (ref 0.76–1.27)
DEPRECATED RDW RBC AUTO: 42.8 FL (ref 37–54)
EOSINOPHIL # BLD MANUAL: 0.34 10*3/MM3 (ref 0–0.4)
EOSINOPHIL NFR BLD MANUAL: 3 % (ref 0.3–6.2)
ERYTHROCYTE [DISTWIDTH] IN BLOOD BY AUTOMATED COUNT: 13 % (ref 12.3–15.4)
GFR SERPL CREATININE-BSD FRML MDRD: 66 ML/MIN/1.73
GLOBULIN UR ELPH-MCNC: 3 GM/DL
GLUCOSE BLD-MCNC: 93 MG/DL (ref 65–99)
HBV SURFACE AB SER RIA-ACNC: REACTIVE
HBV SURFACE AG SERPL QL IA: NORMAL
HCT VFR BLD AUTO: 41.1 % (ref 37.5–51)
HCV AB SER DONR QL: NORMAL
HGB BLD-MCNC: 14.3 G/DL (ref 13–17.7)
HIV1+2 AB SER QL: NORMAL
HOLD SPECIMEN: NORMAL
LYMPHOCYTES # BLD MANUAL: 3.7 10*3/MM3 (ref 0.7–3.1)
LYMPHOCYTES NFR BLD MANUAL: 32.3 % (ref 19.6–45.3)
LYMPHOCYTES NFR BLD MANUAL: 4 % (ref 5–12)
MCH RBC QN AUTO: 31.6 PG (ref 26.6–33)
MCHC RBC AUTO-ENTMCNC: 34.8 G/DL (ref 31.5–35.7)
MCV RBC AUTO: 90.9 FL (ref 79–97)
MONOCYTES # BLD AUTO: 0.46 10*3/MM3 (ref 0.1–0.9)
NEUTROPHILS # BLD AUTO: 6.94 10*3/MM3 (ref 1.7–7)
NEUTROPHILS NFR BLD MANUAL: 60.6 % (ref 42.7–76)
PLAT MORPH BLD: NORMAL
PLATELET # BLD AUTO: 257 10*3/MM3 (ref 140–450)
PMV BLD AUTO: 12.7 FL (ref 6–12)
POTASSIUM BLD-SCNC: 3.9 MMOL/L (ref 3.5–5.2)
PROT SERPL-MCNC: 7.8 G/DL (ref 6–8.5)
PROT SERPL-MCNC: 7.8 G/DL (ref 6–8.5)
RBC # BLD AUTO: 4.52 10*6/MM3 (ref 4.14–5.8)
RBC MORPH BLD: NORMAL
SODIUM BLD-SCNC: 134 MMOL/L (ref 136–145)
WBC MORPH BLD: NORMAL
WBC NRBC COR # BLD: 11.46 10*3/MM3 (ref 3.4–10.8)

## 2020-03-19 PROCEDURE — 87340 HEPATITIS B SURFACE AG IA: CPT | Performed by: NURSE PRACTITIONER

## 2020-03-19 PROCEDURE — 86706 HEP B SURFACE ANTIBODY: CPT | Performed by: NURSE PRACTITIONER

## 2020-03-19 PROCEDURE — 85007 BL SMEAR W/DIFF WBC COUNT: CPT | Performed by: NURSE PRACTITIONER

## 2020-03-19 PROCEDURE — 80053 COMPREHEN METABOLIC PANEL: CPT | Performed by: NURSE PRACTITIONER

## 2020-03-19 PROCEDURE — 80076 HEPATIC FUNCTION PANEL: CPT | Performed by: NURSE PRACTITIONER

## 2020-03-19 PROCEDURE — 86803 HEPATITIS C AB TEST: CPT | Performed by: NURSE PRACTITIONER

## 2020-03-19 PROCEDURE — G0432 EIA HIV-1/HIV-2 SCREEN: HCPCS | Performed by: NURSE PRACTITIONER

## 2020-03-19 PROCEDURE — 85025 COMPLETE CBC W/AUTO DIFF WBC: CPT | Performed by: NURSE PRACTITIONER

## 2020-04-20 ENCOUNTER — LAB REQUISITION (OUTPATIENT)
Dept: LAB | Facility: OTHER | Age: 25
End: 2020-04-20

## 2020-04-20 DIAGNOSIS — Z00.00 ANNUAL PHYSICAL EXAM: ICD-10-CM

## 2020-04-20 LAB
ALBUMIN SERPL-MCNC: 4.1 G/DL (ref 3.5–5.2)
ALBUMIN/GLOB SERPL: 1.5 G/DL
ALP SERPL-CCNC: 85 U/L (ref 39–117)
ALT SERPL W P-5'-P-CCNC: 36 U/L (ref 1–41)
AST SERPL-CCNC: 24 U/L (ref 1–40)
BASOPHILS # BLD AUTO: 0.06 10*3/MM3 (ref 0–0.2)
BASOPHILS NFR BLD AUTO: 0.7 % (ref 0–1.5)
BILIRUB CONJ SERPL-MCNC: <0.2 MG/DL (ref 0.2–0.3)
BILIRUB SERPL-MCNC: 0.6 MG/DL (ref 0.2–1.2)
BILIRUB UR QL STRIP: NEGATIVE
BUN BLD-MCNC: 13 MG/DL (ref 6–20)
CALCIUM SPEC-SCNC: 9 MG/DL (ref 8.6–10.5)
CHLORIDE SERPL-SCNC: 95 MMOL/L (ref 98–107)
CHOLEST SERPL-MCNC: 154 MG/DL (ref 0–200)
CLARITY UR: CLEAR
CO2 SERPL-SCNC: 26.4 MMOL/L (ref 22–29)
COLOR UR: YELLOW
CREAT BLD-MCNC: 0.76 MG/DL (ref 0.76–1.27)
DEPRECATED RDW RBC AUTO: 42.3 FL (ref 37–54)
EOSINOPHIL # BLD AUTO: 0.31 10*3/MM3 (ref 0–0.4)
EOSINOPHIL NFR BLD AUTO: 3.6 % (ref 0.3–6.2)
ERYTHROCYTE [DISTWIDTH] IN BLOOD BY AUTOMATED COUNT: 12.8 % (ref 12.3–15.4)
GFR SERPL CREATININE-BSD FRML MDRD: 123 ML/MIN/1.73
GGT SERPL-CCNC: 31 U/L (ref 8–61)
GLOBULIN UR ELPH-MCNC: 2.8 GM/DL
GLUCOSE BLD-MCNC: 97 MG/DL (ref 65–99)
GLUCOSE UR STRIP-MCNC: NEGATIVE MG/DL
HBV SURFACE AB SER RIA-ACNC: REACTIVE
HCT VFR BLD AUTO: 40.4 % (ref 37.5–51)
HDLC SERPL-MCNC: 26 MG/DL (ref 40–60)
HGB BLD-MCNC: 13.8 G/DL (ref 13–17.7)
HGB UR QL STRIP.AUTO: NEGATIVE
IMM GRANULOCYTES # BLD AUTO: 0.05 10*3/MM3 (ref 0–0.05)
IMM GRANULOCYTES NFR BLD AUTO: 0.6 % (ref 0–0.5)
IRON 24H UR-MRATE: 136 MCG/DL (ref 59–158)
KETONES UR QL STRIP: NEGATIVE
LDH SERPL-CCNC: 237 U/L (ref 135–225)
LDLC SERPL CALC-MCNC: 77 MG/DL (ref 0–100)
LDLC/HDLC SERPL: 2.98 {RATIO}
LEUKOCYTE ESTERASE UR QL STRIP.AUTO: NEGATIVE
LYMPHOCYTES # BLD AUTO: 2.53 10*3/MM3 (ref 0.7–3.1)
LYMPHOCYTES NFR BLD AUTO: 29.3 % (ref 19.6–45.3)
MCH RBC QN AUTO: 31.2 PG (ref 26.6–33)
MCHC RBC AUTO-ENTMCNC: 34.2 G/DL (ref 31.5–35.7)
MCV RBC AUTO: 91.4 FL (ref 79–97)
MONOCYTES # BLD AUTO: 0.73 10*3/MM3 (ref 0.1–0.9)
MONOCYTES NFR BLD AUTO: 8.4 % (ref 5–12)
NEUTROPHILS # BLD AUTO: 4.96 10*3/MM3 (ref 1.7–7)
NEUTROPHILS NFR BLD AUTO: 57.4 % (ref 42.7–76)
NITRITE UR QL STRIP: NEGATIVE
NRBC BLD AUTO-RTO: 0 /100 WBC (ref 0–0.2)
PH UR STRIP.AUTO: 7 [PH] (ref 5–8)
PHOSPHATE SERPL-MCNC: 2.8 MG/DL (ref 2.5–4.5)
PLATELET # BLD AUTO: 214 10*3/MM3 (ref 140–450)
PMV BLD AUTO: 12.3 FL (ref 6–12)
POTASSIUM BLD-SCNC: 3.8 MMOL/L (ref 3.5–5.2)
PROT SERPL-MCNC: 6.9 G/DL (ref 6–8.5)
PROT UR QL STRIP: NEGATIVE
RBC # BLD AUTO: 4.42 10*6/MM3 (ref 4.14–5.8)
SODIUM BLD-SCNC: 129 MMOL/L (ref 136–145)
SP GR UR STRIP: 1.01 (ref 1–1.03)
TRIGL SERPL-MCNC: 253 MG/DL (ref 0–150)
URATE SERPL-MCNC: 6 MG/DL (ref 3.4–7)
UROBILINOGEN UR QL STRIP: NORMAL
VLDLC SERPL-MCNC: 50.6 MG/DL (ref 5–40)
WBC NRBC COR # BLD: 8.64 10*3/MM3 (ref 3.4–10.8)

## 2020-04-20 PROCEDURE — 80053 COMPREHEN METABOLIC PANEL: CPT | Performed by: PHYSICAL MEDICINE & REHABILITATION

## 2020-04-20 PROCEDURE — 86481 TB AG RESPONSE T-CELL SUSP: CPT | Performed by: PHYSICAL MEDICINE & REHABILITATION

## 2020-04-20 PROCEDURE — 85025 COMPLETE CBC W/AUTO DIFF WBC: CPT | Performed by: PHYSICAL MEDICINE & REHABILITATION

## 2020-04-20 PROCEDURE — 80061 LIPID PANEL: CPT | Performed by: PHYSICAL MEDICINE & REHABILITATION

## 2020-04-20 PROCEDURE — 86706 HEP B SURFACE ANTIBODY: CPT | Performed by: PHYSICAL MEDICINE & REHABILITATION

## 2020-04-20 PROCEDURE — 81003 URINALYSIS AUTO W/O SCOPE: CPT | Performed by: PHYSICAL MEDICINE & REHABILITATION

## 2020-06-16 ENCOUNTER — LAB REQUISITION (OUTPATIENT)
Dept: LAB | Facility: OTHER | Age: 25
End: 2020-06-16

## 2020-06-16 DIAGNOSIS — Z01.84 IMMUNITY STATUS TESTING: ICD-10-CM

## 2020-06-16 LAB
HCV AB SER DONR QL: NORMAL
HIV1+2 AB SER QL: NORMAL

## 2020-06-16 PROCEDURE — G0432 EIA HIV-1/HIV-2 SCREEN: HCPCS | Performed by: PHYSICAL MEDICINE & REHABILITATION

## 2020-06-16 PROCEDURE — 86803 HEPATITIS C AB TEST: CPT | Performed by: PHYSICAL MEDICINE & REHABILITATION

## 2020-06-27 DIAGNOSIS — I10 ESSENTIAL HYPERTENSION: ICD-10-CM

## 2020-06-28 RX ORDER — LISINOPRIL 20 MG/1
20 TABLET ORAL DAILY
Qty: 30 TABLET | Refills: 0 | Status: SHIPPED | OUTPATIENT
Start: 2020-06-28 | End: 2020-08-05 | Stop reason: SDUPTHER

## 2020-08-05 DIAGNOSIS — I10 ESSENTIAL HYPERTENSION: ICD-10-CM

## 2020-08-05 RX ORDER — LISINOPRIL 20 MG/1
20 TABLET ORAL DAILY
Qty: 90 TABLET | Refills: 0 | Status: SHIPPED | OUTPATIENT
Start: 2020-08-05 | End: 2021-07-27

## 2020-10-14 NOTE — PROGRESS NOTES
Physical Therapy Daily Progress Note    Time In 8:30  Time Out 10:15    Visit # : 36  Murray Regalado reports: his shoulder felt great over the weekend.     Subjective     Objective   See Exercise, Manual, and Modality Logs for complete treatment.       Assessment & Plan     Assessment  Assessment details: Pt continues to progress strengthening with minimal c/o discomfort post exercise.  Pt still limited in AROM vs ER, but is functional.  Pt with some popping during scaption PROM at end ranges of motion.  Will continue to see pt 2x/week for strengthening per protocol until pt sees MD on 4/10/2018.          Progress per Plan of Care           Manual Therapy:    15     mins  25697;  Therapeutic Exercise:    60     mins  26977;     Neuromuscular Logan:        mins  18707;    Therapeutic Activity:          mins  28523;     Gait Training:           mins  16311;     Ultrasound:          mins  45013;    Electrical Stimulation:    15     mins  19051 ( );  Dry Needling          mins self-pay    Timed Treatment:   75   mins   Total Treatment:     105   mins    Adele Carpio, PT  Physical Therapist  
no hearing difficulty/no ear pain/no tinnitus/no vertigo

## 2021-01-11 ENCOUNTER — IMMUNIZATION (OUTPATIENT)
Dept: VACCINE CLINIC | Facility: HOSPITAL | Age: 26
End: 2021-01-11

## 2021-01-11 PROCEDURE — 91301 HC SARSCO02 VAC 100MCG/0.5ML IM: CPT | Performed by: OBSTETRICS & GYNECOLOGY

## 2021-01-11 PROCEDURE — 0011A: CPT | Performed by: OBSTETRICS & GYNECOLOGY

## 2021-02-10 ENCOUNTER — IMMUNIZATION (OUTPATIENT)
Dept: VACCINE CLINIC | Facility: HOSPITAL | Age: 26
End: 2021-02-10

## 2021-02-10 PROCEDURE — 91301 HC SARSCO02 VAC 100MCG/0.5ML IM: CPT | Performed by: OBSTETRICS & GYNECOLOGY

## 2021-02-10 PROCEDURE — 0012A: CPT | Performed by: OBSTETRICS & GYNECOLOGY

## 2021-07-26 DIAGNOSIS — I10 ESSENTIAL HYPERTENSION: ICD-10-CM

## 2021-07-27 RX ORDER — LISINOPRIL 20 MG/1
20 TABLET ORAL DAILY
Qty: 30 TABLET | Refills: 0 | Status: SHIPPED | OUTPATIENT
Start: 2021-07-27 | End: 2021-09-02 | Stop reason: SDUPTHER

## 2021-08-21 DIAGNOSIS — I10 ESSENTIAL HYPERTENSION: ICD-10-CM

## 2021-08-22 RX ORDER — LISINOPRIL 20 MG/1
TABLET ORAL
Qty: 90 TABLET | OUTPATIENT
Start: 2021-08-22

## 2021-09-02 ENCOUNTER — OFFICE VISIT (OUTPATIENT)
Dept: FAMILY MEDICINE CLINIC | Facility: CLINIC | Age: 26
End: 2021-09-02

## 2021-09-02 VITALS
WEIGHT: 315 LBS | OXYGEN SATURATION: 99 % | SYSTOLIC BLOOD PRESSURE: 134 MMHG | BODY MASS INDEX: 39.17 KG/M2 | HEIGHT: 75 IN | HEART RATE: 75 BPM | DIASTOLIC BLOOD PRESSURE: 82 MMHG

## 2021-09-02 DIAGNOSIS — I10 ESSENTIAL HYPERTENSION: ICD-10-CM

## 2021-09-02 PROBLEM — E66.01 MORBID (SEVERE) OBESITY DUE TO EXCESS CALORIES (HCC): Status: ACTIVE | Noted: 2017-05-19

## 2021-09-02 PROCEDURE — 99213 OFFICE O/P EST LOW 20 MIN: CPT | Performed by: FAMILY MEDICINE

## 2021-09-02 RX ORDER — LISINOPRIL 10 MG/1
10 TABLET ORAL DAILY
Qty: 90 TABLET | Refills: 3 | Status: SHIPPED | OUTPATIENT
Start: 2021-09-02 | End: 2022-05-23

## 2021-09-02 NOTE — PROGRESS NOTES
"  Chief Complaint   Patient presents with   • Hypertension     Answers for HPI/ROS submitted by the patient on 8/31/2021  What is the primary reason for your visit?: High Blood Pressure  Chronicity: chronic  Onset: more than 1 year ago  Progression since onset: unchanged  Condition status: controlled  anxiety: No  blurred vision: No  chest pain: No  headaches: No  malaise/fatigue: No  neck pain: No  orthopnea: No  palpitations: No  peripheral edema: No  PND: No  shortness of breath: No  sweats: No  Agents associated with hypertension: no associated agents  CAD risks: obesity, stress  Compliance problems: diet      Subjective     Patient here for follow-up of elevated blood pressure.    He is not exercising and is not adherent to a low-salt diet.    Blood pressure is well controlled at home.   Cardiac symptoms: none.   Patient denies: chest pressure/discomfort, claudication, cough, dyspnea, exertional chest pressure/discomfort, fatigue, irregular heart beat, lower extremity edema, near-syncope, orthopnea, palpitations, paroxysmal nocturnal dyspnea, syncope and tachypnea.   Cardiovascular risk factors: hypertension, male gender and obesity (BMI >= 30 kg/m2).   Use of agents associated with hypertension: none.   History of target organ damage: none.  Patient is taking prescribed hypertension medications as prescribed without side effects.    The following portions of the patient's history were reviewed and updated as appropriate: allergies, current medications, past family history, past medical history, past social history, past surgical history and problem list.    Review of Systems  Pertinent items are noted in HPI.         Vitals:    09/02/21 0847   BP: 134/82   BP Location: Left arm   Patient Position: Sitting   Cuff Size: Large Adult   Pulse: 75   SpO2: 99%   Weight: (!) 152 kg (334 lb 6.4 oz)   Height: 190.5 cm (75\")     BP Readings from Last 3 Encounters:   09/02/21 134/82   04/24/19 136/84   01/02/18 136/84 "     Objective      Gen: alert, pleasant.  Neck: no bruit, no enlarged thyroid  Lungs: CTA  Heart: RR, no murmur  Feet: no edema  Pulses: intact       Assessment/Plan   Hypertension, normal blood pressure Evidence of target organ damage: none.    Diagnoses and all orders for this visit:    1. Essential hypertension  -     lisinopril (PRINIVIL,ZESTRIL) 10 MG tablet; Take 1 tablet by mouth Daily. Need appointment and labs before next refill.  Indications: High Blood Pressure Disorder  Dispense: 90 tablet; Refill: 3  -     CBC (No Diff); Future  -     Comprehensive Metabolic Panel; Future  -     Lipid Panel; Future  -     Urinalysis With Microscopic If Indicated (No Culture) - Urine, Clean Catch; Future    moved back from [place]  Has not been on bp meds in weeks  Lets try a lower dose.    Medication: decrease to 10 mg of lisinopril.  Follow up: 6 months and as needed.    There are no Patient Instructions on file for this visit.  Medications Discontinued During This Encounter   Medication Reason   • lisinopril (PRINIVIL,ZESTRIL) 20 MG tablet Reorder   • hepatitis A (HAVRIX) 1440 EL U/ML vaccine *Therapy completed        Return in about 6 months (around 3/2/2022).    Limit salt  Limit alcoholic drinks to 1 a day  Limit caffeine to 1-2 servings a day    Dr. Son Hwang MD  [place], [place]  [facility].

## 2022-05-18 ENCOUNTER — TELEPHONE (OUTPATIENT)
Dept: FAMILY MEDICINE CLINIC | Facility: CLINIC | Age: 27
End: 2022-05-18

## 2022-05-18 NOTE — TELEPHONE ENCOUNTER
Caller: Murray Regalado    Relationship: Self    Best call back number:[phone]    What orders are you requesting (i.e. lab or imaging): LABS     In what timeframe would the patient need to come in: 5/20/2022    Where will you receive your lab/imaging services: OFFICE     Additional notes: PATIENT WOULD LIKE TO COME IN FOR LABS THIS Friday. MAY 20TH.

## 2022-05-23 ENCOUNTER — OFFICE VISIT (OUTPATIENT)
Dept: FAMILY MEDICINE CLINIC | Facility: CLINIC | Age: 27
End: 2022-05-23

## 2022-05-23 VITALS
HEIGHT: 74 IN | TEMPERATURE: 97.3 F | BODY MASS INDEX: 40.43 KG/M2 | WEIGHT: 315 LBS | OXYGEN SATURATION: 98 % | DIASTOLIC BLOOD PRESSURE: 98 MMHG | SYSTOLIC BLOOD PRESSURE: 148 MMHG | HEART RATE: 81 BPM

## 2022-05-23 DIAGNOSIS — I10 ESSENTIAL HYPERTENSION: ICD-10-CM

## 2022-05-23 DIAGNOSIS — E66.01 MORBID (SEVERE) OBESITY DUE TO EXCESS CALORIES: ICD-10-CM

## 2022-05-23 DIAGNOSIS — Z00.00 ROUTINE ADULT HEALTH MAINTENANCE: Primary | ICD-10-CM

## 2022-05-23 PROCEDURE — 99395 PREV VISIT EST AGE 18-39: CPT | Performed by: FAMILY MEDICINE

## 2022-05-23 RX ORDER — PHENTERMINE HYDROCHLORIDE 37.5 MG/1
37.5 TABLET ORAL
Qty: 30 TABLET | Refills: 0 | Status: SHIPPED | OUTPATIENT
Start: 2022-05-23 | End: 2022-06-20 | Stop reason: SDUPTHER

## 2022-05-23 RX ORDER — LISINOPRIL 10 MG/1
20 TABLET ORAL DAILY
Qty: 90 TABLET | Refills: 3
Start: 2022-05-23 | End: 2022-06-13 | Stop reason: SDUPTHER

## 2022-05-23 NOTE — PROGRESS NOTES
"  Chief Complaint   Patient presents with   • Annual Exam     Discuss bp/ reading high 140's/90's   Discuss weight loss med/ referral        Subjective   Murray Regalado is a 27 y.o. male and is here for a yearly physical exam. The patient reports problems - , .    Do you take any herbs or supplements that were not prescribed by a doctor? no. If so, these will be added to active medication list.    The following portions of the patient's history were reviewed and updated as appropriate: allergies, current medications, past family history, past medical history, past social history, past surgical history and problem list.    Social and Family and Surgical History reviewed and updated today, see Rooming tab.    Health History, Preventive Measures and Vaccination flow sheets reviewed and updated today.    Patient's current medical chart in Epic; including previous office notes, Mychart messages, recent phone calls, imaging, labs, specialist's evaluation either in notes or in Media tab reviewed today.    Other outside pertinent medical information also reviewed thru Care Everywhere function is also reviewed today.    Review of Systems  Review of Systems  A comprehensive review of systems was negative.    Vitals:    05/23/22 1347   BP: 148/98   BP Location: Left arm   Patient Position: Sitting   Cuff Size: Large Adult   Pulse: 81   Temp: 97.3 °F (36.3 °C)   SpO2: 98%   Weight: (!) 150 kg (330 lb)   Height: 188 cm (74\")     Body mass index is 42.37 kg/m².      General Appearance:  Alert, cooperative, no distress, appears stated age   Head:  Normocephalic, without obvious abnormality, atraumatic   Eyes:  PERRL, conjunctiva/corneas clear, EOM's intact.   Ears:  Normal TM's and external ear canals, both ears   Nose: Nares normal, septum midline, mucosa normal, no drainage or sinus tenderness   Throat: Lips, mucosa, and tongue normal; teeth and gums normal   Neck: Supple, symmetrical, trachea midline, no adenopathy; "   thyroid: No enlargement/tenderness/nodules; no carotid  bruit   Back:  Symmetric, no curvature, ROM normal, no CVA tenderness   Lungs:  Clear to auscultation bilaterally, respirations unlabored   Chest wall:  No tenderness or deformity   Heart:  Regular rate and rhythm, S1 and S2 normal, no murmur, rub or gallop   Abdomen:  Soft, non-tender, bowel sounds active all four quadrants,   no masses, no organomegaly   Rectal:        Extremities: Extremities normal, atraumatic, no cyanosis or edema   Pulses: 2+ and symmetric all extremities   Skin: Skin color, texture, turgor normal, no rashes or lesions   Lymph nodes: Cervical, supraclavicular, and axillary nodes normal   Neurologic: CNII-XII intact. Normal strength, sensation and reflexes   throughout          Results for orders placed or performed in visit on 04/12/22   QuantiFERON-TB Gold Plus    Specimen: Blood   Result Value Ref Range    QuantiFERON Incubation Incubation performed.     QUANTIFERON-TB GOLD PLUS Negative Negative   QuantiFERON-TB Gold Plus    Specimen: Blood   Result Value Ref Range    QuantiFERON Criteria Comment     QUANTIFERON TB1 AG VALUE 0.00 IU/mL    QUANTIFERON TB2 AG VALUE 0.00 IU/mL    QuantiFERON Nil Value 0.00 IU/mL    QuantiFERON Mitogen Value >10.00 IU/mL     Assessment & Plan   Healthy male exam.  Diagnoses and all orders for this visit:    1. Routine adult health maintenance (Primary)    2. Morbid (severe) obesity due to excess calories (HCC)  -     phentermine (Adipex-P) 37.5 MG tablet; Take 1 tablet by mouth Every Morning Before Breakfast.  Dispense: 30 tablet; Refill: 0    3. Essential hypertension  -     lisinopril (PRINIVIL,ZESTRIL) 10 MG tablet; Take 2 tablets by mouth Daily. Indications: High Blood Pressure Disorder  Dispense: 90 tablet; Refill: 3      1. ,  Would like to restart his weight loss program.  Has rejoined the gym and plans on exercising.  Suggested cutting his calories down to 2000 a day.  Low-carb diet is  acceptable.  We will start Adipex today.  He now has 2 young daughters.    BP is up we will double lisinopril to 20 mg    2. Patient Counseling:  --Nutrition: Stressed importance of moderation in sodium/caffeine intake, saturated fat and cholesterol.  Discussed caloric balance, sufficient intake of fresh fruits, vegetables, fiber, calcium, iron.  --Exercise: Stressed the importance of regular exercise.   --Substance Abuse: Discussed cessation/primary prevention of tobacco, alcohol, or other drug use; driving or other dangerous activities under the influence.    --Dental health: Discussed importance of regular tooth brushing, flossing, and dental visits.  --Suggested having eyes and vision checked if needed or past due.  --Immunizations reviewed and given if needed.  --  3. Discussed the patient's BMI with him.  The BMI is above average; BMI management plan is completed  4. Follow up in 1 month    There are no Patient Instructions on file for this visit.    Medications Discontinued During This Encounter   Medication Reason   • predniSONE (DELTASONE) 10 MG (21) dose pack *Therapy completed   • lisinopril (PRINIVIL,ZESTRIL) 10 MG tablet         Dr. Son Hwang MD  [place], [place]  [facility]

## 2022-06-13 DIAGNOSIS — I10 ESSENTIAL HYPERTENSION: ICD-10-CM

## 2022-06-13 RX ORDER — LISINOPRIL 10 MG/1
20 TABLET ORAL DAILY
Qty: 30 TABLET | Refills: 0 | Status: SHIPPED | OUTPATIENT
Start: 2022-06-13 | End: 2022-06-27 | Stop reason: SDUPTHER

## 2022-06-20 DIAGNOSIS — E66.01 MORBID (SEVERE) OBESITY DUE TO EXCESS CALORIES: ICD-10-CM

## 2022-06-22 RX ORDER — PHENTERMINE HYDROCHLORIDE 37.5 MG/1
37.5 TABLET ORAL
Qty: 30 TABLET | Refills: 0 | Status: SHIPPED | OUTPATIENT
Start: 2022-06-22 | End: 2022-07-19 | Stop reason: SDUPTHER

## 2022-06-27 ENCOUNTER — OFFICE VISIT (OUTPATIENT)
Dept: FAMILY MEDICINE CLINIC | Facility: CLINIC | Age: 27
End: 2022-06-27

## 2022-06-27 VITALS
BODY MASS INDEX: 38.12 KG/M2 | DIASTOLIC BLOOD PRESSURE: 80 MMHG | TEMPERATURE: 97.8 F | HEIGHT: 74 IN | HEART RATE: 98 BPM | SYSTOLIC BLOOD PRESSURE: 124 MMHG | OXYGEN SATURATION: 99 % | WEIGHT: 297 LBS

## 2022-06-27 DIAGNOSIS — I10 ESSENTIAL HYPERTENSION: ICD-10-CM

## 2022-06-27 DIAGNOSIS — E66.01 CLASS 2 SEVERE OBESITY DUE TO EXCESS CALORIES WITH SERIOUS COMORBIDITY AND BODY MASS INDEX (BMI) OF 38.0 TO 38.9 IN ADULT: Primary | ICD-10-CM

## 2022-06-27 PROBLEM — E66.812 CLASS 2 SEVERE OBESITY DUE TO EXCESS CALORIES WITH SERIOUS COMORBIDITY AND BODY MASS INDEX (BMI) OF 38.0 TO 38.9 IN ADULT: Status: ACTIVE | Noted: 2017-05-19

## 2022-06-27 PROCEDURE — 99213 OFFICE O/P EST LOW 20 MIN: CPT | Performed by: FAMILY MEDICINE

## 2022-06-27 RX ORDER — LISINOPRIL 20 MG/1
20 TABLET ORAL DAILY
Qty: 90 TABLET | Refills: 3 | Status: SHIPPED | OUTPATIENT
Start: 2022-06-27

## 2022-06-27 NOTE — PROGRESS NOTES
"  Subjective   Murray Regalado is a 27 y.o. male who is here for   Chief Complaint   Patient presents with   • Weight Check   • Hypertension   .   Answers for HPI/ROS submitted by the patient on 6/20/2022  What is the primary reason for your visit?: Other  Please describe your symptoms.: follow up from 30 days of new medication  Have you had these symptoms before?: No  How long have you been having these symptoms?: Greater than 2 weeks      History of Present Illness     Dante is here in follow-up for phentermine.  He has done quite well.  He has lost 30 pounds.  Medicine really seems to be helping.  No side effects.  No exercise as of yet.  He is reduce his calories.  Cut out junk food and starches.    Blood pressure also still well controlled.    The following portions of the patient's history were reviewed and updated as appropriate: allergies, current medications, past family history, past medical history, past social history, past surgical history and problem list.    Review of Systems    Objective   Vitals:    06/27/22 0928   BP: 124/80   Pulse: 98   Temp: 97.8 °F (36.6 °C)   SpO2: 99%   Weight: 135 kg (297 lb)   Height: 188 cm (74\")      Wt Readings from Last 3 Encounters:   06/27/22 135 kg (297 lb)   05/23/22 (!) 150 kg (330 lb)   04/11/22 (!) 150 kg (330 lb)       Body mass index is 38.13 kg/m².    Physical Exam  Vitals reviewed.   Cardiovascular:      Rate and Rhythm: Normal rate.   Neurological:      Mental Status: He is alert.         Assessment & Plan   Diagnoses and all orders for this visit:    1. Class 2 severe obesity due to excess calories with serious comorbidity and body mass index (BMI) of 38.0 to 38.9 in adult (HCC) (Primary)    2. Essential hypertension  -     lisinopril (PRINIVIL,ZESTRIL) 20 MG tablet; Take 1 tablet by mouth Daily. Indications: High Blood Pressure Disorder  Dispense: 90 tablet; Refill: 3    Continue phentermine  Regular refill on lisinopril.  See him back in 1 " month      Patient has been prescribed controlled medications.  Treatment discussed  PACO updated and reviewed.  PDMP also reviewed and marked as reviewed.  Risk and Benefits discussed.  Per [place].    There are no Patient Instructions on file for this visit.    Medications Discontinued During This Encounter   Medication Reason   • lisinopril (PRINIVIL,ZESTRIL) 10 MG tablet Reorder        No follow-ups on file.    Dr. Son Hwang  [place], [place]

## 2022-07-19 ENCOUNTER — OFFICE VISIT (OUTPATIENT)
Dept: FAMILY MEDICINE CLINIC | Facility: CLINIC | Age: 27
End: 2022-07-19

## 2022-07-19 VITALS
HEIGHT: 74 IN | WEIGHT: 285 LBS | BODY MASS INDEX: 36.57 KG/M2 | TEMPERATURE: 97.1 F | SYSTOLIC BLOOD PRESSURE: 120 MMHG | OXYGEN SATURATION: 97 % | HEART RATE: 80 BPM | DIASTOLIC BLOOD PRESSURE: 82 MMHG

## 2022-07-19 DIAGNOSIS — E66.01 CLASS 2 SEVERE OBESITY DUE TO EXCESS CALORIES WITH SERIOUS COMORBIDITY AND BODY MASS INDEX (BMI) OF 38.0 TO 38.9 IN ADULT: Primary | ICD-10-CM

## 2022-07-19 PROBLEM — S43.431A TEAR OF RIGHT GLENOID LABRUM: Status: RESOLVED | Noted: 2017-10-24 | Resolved: 2022-07-19

## 2022-07-19 PROCEDURE — 99213 OFFICE O/P EST LOW 20 MIN: CPT | Performed by: FAMILY MEDICINE

## 2022-07-19 RX ORDER — PHENTERMINE HYDROCHLORIDE 37.5 MG/1
37.5 TABLET ORAL
Qty: 30 TABLET | Refills: 3 | Status: SHIPPED | OUTPATIENT
Start: 2022-07-19 | End: 2022-12-19

## 2022-07-19 NOTE — PROGRESS NOTES
"  Subjective   Murray Regalado is a 27 y.o. male who is here for   Chief Complaint   Patient presents with   • Obesity     Phentermine f/u    .   Answers for HPI/ROS submitted by the patient on 7/17/2022  What is the primary reason for your visit?: Other  Please describe your symptoms.: Follow up visit for medication  Have you had these symptoms before?: No  How long have you been having these symptoms?: Greater than 2 weeks      Obesity  This is a chronic problem. The current episode started more than 1 year ago. The problem has been gradually improving. Associated symptoms include fatigue. The treatment provided mild relief.   We started Murray on Adipex 2 months ago.  He has had good success.  Given his calories less than 2000 a day.  Eliminating most carbohydrates and starches.  Going to the gym 3-4 times a week.  A greater than 10 pound weight loss for last month.    The following portions of the patient's history were reviewed and updated as appropriate: allergies, current medications, past family history, past medical history, past social history, past surgical history and problem list.    Review of Systems   Constitutional: Positive for fatigue.       Objective   Vitals:    07/19/22 1245   BP: 120/82   BP Location: Left arm   Patient Position: Sitting   Cuff Size: Adult   Pulse: 80   Temp: 97.1 °F (36.2 °C)   SpO2: 97%   Weight: 129 kg (285 lb)   Height: 188 cm (74\")      Wt Readings from Last 3 Encounters:   07/19/22 129 kg (285 lb)   06/27/22 135 kg (297 lb)   05/23/22 (!) 150 kg (330 lb)       Body mass index is 36.59 kg/m².    Physical Exam  Vitals reviewed.   Cardiovascular:      Rate and Rhythm: Normal rate.   Pulmonary:      Effort: Pulmonary effort is normal.   Neurological:      Mental Status: He is alert.         Assessment & Plan   Diagnoses and all orders for this visit:    1. Class 2 severe obesity due to excess calories with serious comorbidity and body mass index (BMI) of 38.0 to 38.9 in " adult (HCC) (Primary)  -     phentermine (Adipex-P) 37.5 MG tablet; Take 1 tablet by mouth Every Morning Before Breakfast.  Dispense: 30 tablet; Refill: 3    Continue Adipex for more months, 6-month total.  Continue same dietary intake, continue same exercise program.      There are no Patient Instructions on file for this visit.    Medications Discontinued During This Encounter   Medication Reason   • phentermine (Adipex-P) 37.5 MG tablet Reorder        Return in about 1 year (around 7/19/2023) for Annual physical.    Dr. Son Hwang  [facility]  [place], [place]

## 2022-08-28 ENCOUNTER — HOSPITAL ENCOUNTER (EMERGENCY)
Facility: HOSPITAL | Age: 27
Discharge: HOME | End: 2022-08-28
Attending: EMERGENCY MEDICINE | Admitting: EMERGENCY MEDICINE

## 2022-08-28 ENCOUNTER — APPOINTMENT (OUTPATIENT)
Dept: GENERAL RADIOLOGY | Facility: HOSPITAL | Age: 27
End: 2022-08-28

## 2022-08-28 VITALS
OXYGEN SATURATION: 98 % | HEIGHT: 74 IN | HEART RATE: 68 BPM | BODY MASS INDEX: 33.88 KG/M2 | TEMPERATURE: 98 F | RESPIRATION RATE: 18 BRPM | WEIGHT: 264 LBS | DIASTOLIC BLOOD PRESSURE: 60 MMHG | SYSTOLIC BLOOD PRESSURE: 107 MMHG

## 2022-08-28 DIAGNOSIS — R07.89 ATYPICAL CHEST PAIN: Primary | ICD-10-CM

## 2022-08-28 DIAGNOSIS — I10 HYPERTENSION, UNSPECIFIED TYPE: ICD-10-CM

## 2022-08-28 LAB
ALBUMIN SERPL-MCNC: 4.9 G/DL (ref 3.5–5.2)
ALBUMIN/GLOB SERPL: 1.8 G/DL
ALP SERPL-CCNC: 105 U/L (ref 39–117)
ALT SERPL W P-5'-P-CCNC: 37 U/L (ref 1–41)
ANION GAP SERPL CALCULATED.3IONS-SCNC: 13.4 MMOL/L (ref 5–15)
AST SERPL-CCNC: 24 U/L (ref 1–40)
BASOPHILS # BLD AUTO: 0.06 10*3/MM3 (ref 0–0.2)
BASOPHILS NFR BLD AUTO: 0.4 % (ref 0–1.5)
BILIRUB SERPL-MCNC: 0.6 MG/DL (ref 0–1.2)
BILIRUB UR QL STRIP: NEGATIVE
BUN SERPL-MCNC: 18 MG/DL (ref 6–20)
BUN/CREAT SERPL: 13.6 (ref 7–25)
CALCIUM SPEC-SCNC: 9.8 MG/DL (ref 8.6–10.5)
CHLORIDE SERPL-SCNC: 93 MMOL/L (ref 98–107)
CLARITY UR: CLEAR
CO2 SERPL-SCNC: 25.6 MMOL/L (ref 22–29)
COLOR UR: YELLOW
CREAT SERPL-MCNC: 1.32 MG/DL (ref 0.76–1.27)
DEPRECATED RDW RBC AUTO: 44.5 FL (ref 37–54)
EGFRCR SERPLBLD CKD-EPI 2021: 74.9 ML/MIN/1.73
EOSINOPHIL # BLD AUTO: 0.1 10*3/MM3 (ref 0–0.4)
EOSINOPHIL NFR BLD AUTO: 0.6 % (ref 0.3–6.2)
ERYTHROCYTE [DISTWIDTH] IN BLOOD BY AUTOMATED COUNT: 13.1 % (ref 12.3–15.4)
GLOBULIN UR ELPH-MCNC: 2.7 GM/DL
GLUCOSE SERPL-MCNC: 88 MG/DL (ref 65–99)
GLUCOSE UR STRIP-MCNC: NEGATIVE MG/DL
HCT VFR BLD AUTO: 43.3 % (ref 37.5–51)
HGB BLD-MCNC: 14.6 G/DL (ref 13–17.7)
HGB UR QL STRIP.AUTO: NEGATIVE
HOLD SPECIMEN: NORMAL
HOLD SPECIMEN: NORMAL
IMM GRANULOCYTES # BLD AUTO: 0.09 10*3/MM3 (ref 0–0.05)
IMM GRANULOCYTES NFR BLD AUTO: 0.6 % (ref 0–0.5)
KETONES UR QL STRIP: NEGATIVE
LEUKOCYTE ESTERASE UR QL STRIP.AUTO: NEGATIVE
LYMPHOCYTES # BLD AUTO: 2.02 10*3/MM3 (ref 0.7–3.1)
LYMPHOCYTES NFR BLD AUTO: 12.4 % (ref 19.6–45.3)
MCH RBC QN AUTO: 31.6 PG (ref 26.6–33)
MCHC RBC AUTO-ENTMCNC: 33.7 G/DL (ref 31.5–35.7)
MCV RBC AUTO: 93.7 FL (ref 79–97)
MONOCYTES # BLD AUTO: 1.03 10*3/MM3 (ref 0.1–0.9)
MONOCYTES NFR BLD AUTO: 6.3 % (ref 5–12)
NEUTROPHILS NFR BLD AUTO: 13 10*3/MM3 (ref 1.7–7)
NEUTROPHILS NFR BLD AUTO: 79.7 % (ref 42.7–76)
NITRITE UR QL STRIP: NEGATIVE
NRBC BLD AUTO-RTO: 0 /100 WBC (ref 0–0.2)
PH UR STRIP.AUTO: 7 [PH] (ref 4.5–8)
PLATELET # BLD AUTO: 234 10*3/MM3 (ref 140–450)
PMV BLD AUTO: 11.7 FL (ref 6–12)
POTASSIUM SERPL-SCNC: 4.6 MMOL/L (ref 3.5–5.2)
PROT SERPL-MCNC: 7.6 G/DL (ref 6–8.5)
PROT UR QL STRIP: NEGATIVE
QT INTERVAL: 347 MS
RBC # BLD AUTO: 4.62 10*6/MM3 (ref 4.14–5.8)
SODIUM SERPL-SCNC: 132 MMOL/L (ref 136–145)
SP GR UR STRIP: 1.01 (ref 1–1.03)
TROPONIN T SERPL-MCNC: <0.01 NG/ML (ref 0–0.03)
TROPONIN T SERPL-MCNC: <0.01 NG/ML (ref 0–0.03)
UROBILINOGEN UR QL STRIP: NORMAL
WBC NRBC COR # BLD: 16.3 10*3/MM3 (ref 3.4–10.8)
WHOLE BLOOD HOLD COAG: NORMAL
WHOLE BLOOD HOLD SPECIMEN: NORMAL

## 2022-08-28 PROCEDURE — 36415 COLL VENOUS BLD VENIPUNCTURE: CPT

## 2022-08-28 PROCEDURE — 99284 EMERGENCY DEPT VISIT MOD MDM: CPT

## 2022-08-28 PROCEDURE — 80053 COMPREHEN METABOLIC PANEL: CPT | Performed by: EMERGENCY MEDICINE

## 2022-08-28 PROCEDURE — 81003 URINALYSIS AUTO W/O SCOPE: CPT | Performed by: EMERGENCY MEDICINE

## 2022-08-28 PROCEDURE — 85025 COMPLETE CBC W/AUTO DIFF WBC: CPT | Performed by: EMERGENCY MEDICINE

## 2022-08-28 PROCEDURE — 93010 ELECTROCARDIOGRAM REPORT: CPT | Performed by: INTERNAL MEDICINE

## 2022-08-28 PROCEDURE — 71046 X-RAY EXAM CHEST 2 VIEWS: CPT

## 2022-08-28 PROCEDURE — 84484 ASSAY OF TROPONIN QUANT: CPT | Performed by: EMERGENCY MEDICINE

## 2022-08-28 PROCEDURE — 93005 ELECTROCARDIOGRAM TRACING: CPT | Performed by: EMERGENCY MEDICINE

## 2022-08-28 RX ORDER — ASPIRIN 81 MG/1
81 TABLET ORAL DAILY
Qty: 30 TABLET | Refills: 0 | Status: SHIPPED | OUTPATIENT
Start: 2022-08-28

## 2022-08-28 RX ORDER — SODIUM CHLORIDE 0.9 % (FLUSH) 0.9 %
10 SYRINGE (ML) INJECTION AS NEEDED
Status: DISCONTINUED | OUTPATIENT
Start: 2022-08-28 | End: 2022-08-28 | Stop reason: HOSPADM

## 2022-08-28 RX ORDER — NITROGLYCERIN 0.4 MG/1
0.4 TABLET SUBLINGUAL
Status: COMPLETED | OUTPATIENT
Start: 2022-08-28 | End: 2022-08-28

## 2022-08-28 RX ORDER — NITROGLYCERIN 0.4 MG/1
0.4 TABLET SUBLINGUAL
Status: DISCONTINUED | OUTPATIENT
Start: 2022-08-28 | End: 2022-08-28

## 2022-08-28 RX ADMIN — NITROGLYCERIN 0.4 MG: 0.4 TABLET SUBLINGUAL at 18:13

## 2022-08-29 LAB — QT INTERVAL: 371 MS

## 2022-09-02 ENCOUNTER — OFFICE VISIT (OUTPATIENT)
Dept: FAMILY MEDICINE CLINIC | Facility: CLINIC | Age: 27
End: 2022-09-02

## 2022-09-02 VITALS
BODY MASS INDEX: 34.52 KG/M2 | OXYGEN SATURATION: 99 % | SYSTOLIC BLOOD PRESSURE: 120 MMHG | WEIGHT: 269 LBS | TEMPERATURE: 97.8 F | DIASTOLIC BLOOD PRESSURE: 70 MMHG | HEART RATE: 72 BPM | HEIGHT: 74 IN

## 2022-09-02 DIAGNOSIS — R07.89 ATYPICAL CHEST PAIN: Primary | ICD-10-CM

## 2022-09-02 PROCEDURE — 99213 OFFICE O/P EST LOW 20 MIN: CPT | Performed by: FAMILY MEDICINE

## 2022-09-02 NOTE — PROGRESS NOTES
Subjective   Murray Regalado is a 27 y.o. male who is here for   Chief Complaint   Patient presents with   • Follow-up     ER 8/28- chest pain and htn are better, recommended a stress test    .   Answers for HPI/ROS submitted by the patient on 8/31/2022  What is the primary reason for your visit?: Other  Please describe your symptoms.: Went to ER on 8-29 with jaw pain that radiated down the left side of my neck into the mid sternal chest. Neither ASA OR NITRO relived the pain and there was dizyness that came along with it ASA was taken by me and nitro was given at hospital. 12 lead EKG done shortly after pain started and prior to arriving at hospital showed sinus tachycardia at a rate of 140 and my hr prior to having this pain was in the 180s while i was exercising.  Have you had these symptoms before?: No  How long have you been having these symptoms?: 1-4 days  Please describe any probable cause for these symptoms. : My only thoughts on why i could have had these symptoms is going into SVT or out of SVT. Hypotension due to weight loss and needing my lisonopril adjusted.          History of Present Illness     Dante was at the gym working hard on the treadmill and elliptical rider.  After he finished he had a sharp pain in his right neck and jaw.  Lasted less than 5 minutes.  He said he EMS, he took an aspirin and nitro.  Went to the ER, work-up there was negative seen and released home.  Since that time he had no other pain.  Has already gone back to the gym and worked out pretty hard with no recurrent symptoms.  He has no known cardiopulmonary issues.  Again he denies no further symptoms at the gym or home or work or with sexual intercourse.  ER records x-ray EKG labs reviewed    The following portions of the patient's history were reviewed and updated as appropriate: allergies, current medications, past family history, past medical history, past social history, past surgical history and problem  "list.    Review of Systems    Objective   Vitals:    09/02/22 0921   BP: 120/70   BP Location: Left arm   Patient Position: Sitting   Cuff Size: Large Adult   Pulse: 72   Temp: 97.8 °F (36.6 °C)   SpO2: 99%   Weight: 122 kg (269 lb)   Height: 188 cm (74\")      Physical Exam  Cardiovascular:      Rate and Rhythm: Normal rate.   Pulmonary:      Effort: Pulmonary effort is normal.   Musculoskeletal:      Cervical back: Normal range of motion. No tenderness.   Neurological:      Mental Status: He is alert.         Assessment & Plan   Diagnoses and all orders for this visit:    1. Atypical chest pain (Primary)    Atypical chest pain.  He has had no recurrent symptoms.  If he has for the symptoms he would like to proceed with a treadmill EKG.  Discussed ordering it today.  He had 1 in the past and it was $1500 out-of-pocket.  He would like to avoid that cost if possible.  But he promises if he has chest pain he will call and we will order.    There are no Patient Instructions on file for this visit.    There are no discontinued medications.     No follow-ups on file.    Dr. Son Hwang  [place], [place]    "

## 2022-12-12 ENCOUNTER — PATIENT MESSAGE (OUTPATIENT)
Dept: FAMILY MEDICINE CLINIC | Facility: CLINIC | Age: 27
End: 2022-12-12

## 2022-12-12 DIAGNOSIS — E66.01 CLASS 2 SEVERE OBESITY DUE TO EXCESS CALORIES WITH SERIOUS COMORBIDITY AND BODY MASS INDEX (BMI) OF 38.0 TO 38.9 IN ADULT: ICD-10-CM

## 2022-12-12 DIAGNOSIS — E66.01 CLASS 2 SEVERE OBESITY DUE TO EXCESS CALORIES WITH SERIOUS COMORBIDITY AND BODY MASS INDEX (BMI) OF 38.0 TO 38.9 IN ADULT: Primary | ICD-10-CM

## 2022-12-12 NOTE — TELEPHONE ENCOUNTER
From: Murray Regalado  To: Son Hwang MD  Sent: 12/12/2022 11:07 AM EST  Subject: medication for appitite     Dr Hwang i have completed my phentermine and the drug worked great i lost a total of 87 lbs dropping to 237.8 lbs. however now my appetite has come back and i am having problems with controlling it. i was wondering if you would consider me trying Bupropion-naltrexone (Contrave). to continue my weight loss journey i am in the gym 3 times a week doing cardio and strength training. i have gained back up to 250lbs as of this morning. please let me know your thoughts. thanks

## 2022-12-19 RX ORDER — PHENTERMINE AND TOPIRAMATE 7.5; 46 MG/1; MG/1
1 CAPSULE, EXTENDED RELEASE ORAL EVERY MORNING
Qty: 30 CAPSULE | Refills: 1 | Status: SHIPPED | OUTPATIENT
Start: 2022-12-19

## 2022-12-19 RX ORDER — PHENTERMINE AND TOPIRAMATE 7.5; 46 MG/1; MG/1
1 CAPSULE, EXTENDED RELEASE ORAL EVERY MORNING
Qty: 30 CAPSULE | Refills: 1 | Status: SHIPPED | OUTPATIENT
Start: 2022-12-19 | End: 2022-12-19 | Stop reason: SDUPTHER

## 2022-12-28 ENCOUNTER — TELEPHONE (OUTPATIENT)
Dept: FAMILY MEDICINE CLINIC | Facility: CLINIC | Age: 27
End: 2022-12-28

## 2022-12-28 DIAGNOSIS — E66.01 CLASS 2 SEVERE OBESITY DUE TO EXCESS CALORIES WITH SERIOUS COMORBIDITY AND BODY MASS INDEX (BMI) OF 38.0 TO 38.9 IN ADULT: Primary | ICD-10-CM

## 2022-12-28 NOTE — TELEPHONE ENCOUNTER
From: Murray Regalado  To: Son Hwang MD  Sent: 12/12/2022 1:26 PM EST  Subject: contrave     can you please send that prescription to the [facility] pharmacy?  
Please Advise  
Not applicable

## 2022-12-28 NOTE — TELEPHONE ENCOUNTER
Caller: Murray Regalado    Relationship: Self    Best call back number: [phone]    What was the call regarding:    PHARMACY REQUESTING A PRIOR AUTHORIZATION ON:  Phentermine-Topiramate (Qsymia) 7.5-46 MG capsule sustained-release 24 hr    PHARMACY:  [id number] - [place] [address] [facility] [address] - [phone]  - [phone] FX          Do you require a callback: YES

## 2023-01-06 NOTE — TELEPHONE ENCOUNTER
Caller: Murray Regalado    Relationship: Self    Best call back number: [phone]    What is the best time to reach you: ANYTIME    Who are you requesting to speak with (clinical staff, provider,  specific staff member): CLINICAL    What was the call regarding: PATIENT WAS LOOKING FOR AN UPDATE ABOUT THE PRIOR AUTHORIZATION FOR HIS naltrexone-bupropion ER (CONTRAVE) 8-90 MG tablet.

## 2023-04-21 ENCOUNTER — OFFICE VISIT (OUTPATIENT)
Dept: FAMILY MEDICINE CLINIC | Facility: CLINIC | Age: 28
End: 2023-04-21
Payer: COMMERCIAL

## 2023-04-21 VITALS
SYSTOLIC BLOOD PRESSURE: 114 MMHG | HEART RATE: 81 BPM | WEIGHT: 283 LBS | OXYGEN SATURATION: 100 % | HEIGHT: 72 IN | DIASTOLIC BLOOD PRESSURE: 78 MMHG | BODY MASS INDEX: 38.33 KG/M2 | TEMPERATURE: 97.7 F

## 2023-04-21 DIAGNOSIS — E66.01 CLASS 2 SEVERE OBESITY DUE TO EXCESS CALORIES WITH SERIOUS COMORBIDITY AND BODY MASS INDEX (BMI) OF 38.0 TO 38.9 IN ADULT: Primary | ICD-10-CM

## 2023-04-21 DIAGNOSIS — I10 ESSENTIAL HYPERTENSION: ICD-10-CM

## 2023-04-21 NOTE — PROGRESS NOTES
"  Chief Complaint   Patient presents with   • Weight Check     Discuss med change     Answers for HPI/ROS submitted by the patient on 4/20/2023  What is the primary reason for your visit?: Other  Please describe your symptoms.: looking to be placed on a different weight loss medication  Have you had these symptoms before?: Yes  How long have you been having these symptoms?: Greater than 2 weeks  Please list any medications you are currently taking for this condition.: none finished up Qusema  Please describe any probable cause for these symptoms. : not sure      Subjective     Patient here for follow-up of elevated blood pressure.    He is exercising and is adherent to a low-salt diet.    Blood pressure is well controlled at home.   Cardiac symptoms: dyspnea, fatigue and weight gain.   Patient denies: chest pain.   Cardiovascular risk factors: hypertension, male gender and obesity (BMI >= 30 kg/m2).   Use of agents associated with hypertension: none.   History of target organ damage: none.  Patient is taking prescribed hypertension medications as prescribed without side effects.    Qsymia did not work    Never got Contrave = insurance denial.      The following portions of the patient's history were reviewed and updated as appropriate: allergies, current medications, past family history, past medical history, past social history, past surgical history and problem list.    Review of Systems  Pertinent items are noted in HPI.         Vitals:    04/21/23 0919   BP: 114/78   Pulse: 81   Temp: 97.7 °F (36.5 °C)   SpO2: 100%   Weight: 128 kg (283 lb)   Height: 181.6 cm (71.5\")     BP Readings from Last 3 Encounters:   04/21/23 114/78   09/02/22 120/70   08/28/22 107/60     Objective      Gen: alert, pleasant.  Neck: no bruit, no enlarged thyroid  Lungs: CTA  Heart: RR, no murmur  Feet: no edema  Pulses: intact    Assessment & Plan   Hypertension, normal blood pressure Evidence of target organ damage: none.    Diagnoses " and all orders for this visit:    1. Class 2 severe obesity due to excess calories with serious comorbidity and body mass index (BMI) of 38.0 to 38.9 in adult (Primary)  -     Semaglutide-Weight Management 1 MG/0.5ML solution auto-injector; Inject 0.5 mL under the skin into the appropriate area as directed 1 (One) Time Per Week for 30 days. Indications: OBESITY  Dispense: 2 mL; Refill: 0    2. Essential hypertension      Medication: no change.  Follow up: 1 month and as needed.    There are no Patient Instructions on file for this visit.  Medications Discontinued During This Encounter   Medication Reason   • Phentermine-Topiramate (Qsymia) 7.5-46 MG capsule sustained-release 24 hr *Therapy completed   • naltrexone-bupropion ER (CONTRAVE) 8-90 MG tablet *Therapy completed   • diazePAM (Valium) 5 MG tablet *Therapy completed        Return in about 1 month (around 5/21/2023) for new medication follow up.    Limit salt  Limit alcoholic drinks to 1 a day  Limit caffeine to 1-2 servings a day    Dr. Son Hwang MD  [place], [place]  [facility].

## 2023-05-19 ENCOUNTER — OFFICE VISIT (OUTPATIENT)
Dept: FAMILY MEDICINE CLINIC | Facility: CLINIC | Age: 28
End: 2023-05-19
Payer: COMMERCIAL

## 2023-05-19 VITALS
BODY MASS INDEX: 36.7 KG/M2 | WEIGHT: 271 LBS | OXYGEN SATURATION: 97 % | HEIGHT: 72 IN | SYSTOLIC BLOOD PRESSURE: 120 MMHG | HEART RATE: 73 BPM | TEMPERATURE: 97.8 F | DIASTOLIC BLOOD PRESSURE: 72 MMHG

## 2023-05-19 DIAGNOSIS — E66.01 CLASS 2 SEVERE OBESITY DUE TO EXCESS CALORIES WITH SERIOUS COMORBIDITY AND BODY MASS INDEX (BMI) OF 38.0 TO 38.9 IN ADULT: ICD-10-CM

## 2023-05-19 DIAGNOSIS — I10 ESSENTIAL HYPERTENSION: Primary | ICD-10-CM

## 2023-05-19 DIAGNOSIS — Z00.00 ROUTINE ADULT HEALTH MAINTENANCE: ICD-10-CM

## 2023-05-19 NOTE — PROGRESS NOTES
"  Chief Complaint   Patient presents with   • Obesity     New medication f/u      Answers for HPI/ROS submitted by the patient on 5/18/2023  What is the primary reason for your visit?: Other  Please describe your symptoms.: follow up for new medication and lab work for physical  Have you had these symptoms before?: Yes  How long have you been having these symptoms?: Greater than 2 weeks      Subjective     Patient here for follow-up of elevated blood pressure.    He is exercising and is adherent to a low-salt diet.    Blood pressure is well controlled at home.   Cardiac symptoms: none.   Patient denies: chest pressure/discomfort.   Cardiovascular risk factors: obesity (BMI >= 30 kg/m2).   Use of agents associated with hypertension: none.   History of target organ damage: none.  Patient is taking prescribed hypertension medications as prescribed without side effects.    The following portions of the patient's history were reviewed and updated as appropriate: allergies, current medications, past family history, past medical history, past social history, past surgical history and problem list.    Review of Systems  Pertinent items are noted in HPI.    Results for orders placed or performed in visit on 04/21/23   QuantiFERON-TB Gold Plus    Specimen: Blood   Result Value Ref Range    QuantiFERON Incubation Incubation performed.     QUANTIFERON-TB GOLD PLUS Negative Negative   QuantiFERON-TB Gold Plus    Specimen: Blood   Result Value Ref Range    QuantiFERON Criteria Comment     QUANTIFERON TB1 AG VALUE 0.02 IU/mL    QUANTIFERON TB2 AG VALUE 0.02 IU/mL    QuantiFERON Nil Value 0.01 IU/mL    QuantiFERON Mitogen Value >10.00 IU/mL        Vitals:    05/19/23 0830   BP: 120/72   BP Location: Left arm   Patient Position: Sitting   Cuff Size: Large Adult   Pulse: 73   Temp: 97.8 °F (36.6 °C)   SpO2: 97%   Weight: 123 kg (271 lb)   Height: 181.6 cm (71.5\")     BP Readings from Last 3 Encounters:   05/19/23 120/72   04/21/23 " 114/78   09/02/22 120/70     Objective      Gen: alert, pleasant.  Neck: no bruit, no enlarged thyroid  Lungs: CTA  Heart: RR, no murmur  Feet: no edema  Pulses: intact    Assessment & Plan   Hypertension, normal blood pressure Evidence of target organ damage: none.    Diagnoses and all orders for this visit:    1. Essential hypertension (Primary)  -     CBC (No Diff)  -     Comprehensive Metabolic Panel  -     Lipid Panel  -     Testosterone, Free, Total  -     TSH Rfx On Abnormal To Free T4  -     Urinalysis With Microscopic If Indicated (No Culture) - Urine, Clean Catch    2. Class 2 severe obesity due to excess calories with serious comorbidity and body mass index (BMI) of 38.0 to 38.9 in adult  -     Semaglutide-Weight Management 1 MG/0.5ML solution auto-injector; Inject 0.5 mL under the skin into the appropriate area as directed 1 (One) Time Per Week for 4 doses. Indications: OBESITY  Dispense: 2 mL; Refill: 0    3. Routine adult health maintenance  -     CBC (No Diff)  -     Comprehensive Metabolic Panel  -     Lipid Panel  -     Testosterone, Free, Total  -     TSH Rfx On Abnormal To Free T4  -     Urinalysis With Microscopic If Indicated (No Culture) - Urine, Clean Catch    having success with Wegovy for weight loss  Still with nausea, so will keep dose same at 1 mg q week.    Medication: no change.  Follow up: 1 month and as needed.    There are no Patient Instructions on file for this visit.  Medications Discontinued During This Encounter   Medication Reason   • aspirin (aspirin) 81 MG EC tablet *Therapy completed   • Semaglutide-Weight Management 1 MG/0.5ML solution auto-injector Reorder        Return in about 1 month (around 6/19/2023) for new medication follow up, labs now.    Limit salt  Limit alcoholic drinks to 1 a day  Limit caffeine to 1-2 servings a day    Dr. Son Hwang MD  [place], [place]  [facility].

## 2023-05-22 LAB
ALBUMIN SERPL-MCNC: 5 G/DL (ref 3.5–5.2)
ALBUMIN/GLOB SERPL: 2.3 G/DL
ALP SERPL-CCNC: 89 U/L (ref 39–117)
ALT SERPL-CCNC: 18 U/L (ref 1–41)
APPEARANCE UR: CLEAR
AST SERPL-CCNC: 18 U/L (ref 1–40)
BILIRUB SERPL-MCNC: 0.8 MG/DL (ref 0–1.2)
BILIRUB UR QL STRIP: NEGATIVE
BUN SERPL-MCNC: 13 MG/DL (ref 6–20)
BUN/CREAT SERPL: 13.7 (ref 7–25)
CALCIUM SERPL-MCNC: 9.9 MG/DL (ref 8.6–10.5)
CHLORIDE SERPL-SCNC: 101 MMOL/L (ref 98–107)
CHOLEST SERPL-MCNC: 146 MG/DL (ref 0–200)
CO2 SERPL-SCNC: 23.9 MMOL/L (ref 22–29)
COLOR UR: YELLOW
CREAT SERPL-MCNC: 0.95 MG/DL (ref 0.76–1.27)
EGFRCR SERPLBLD CKD-EPI 2021: 110.4 ML/MIN/1.73
ERYTHROCYTE [DISTWIDTH] IN BLOOD BY AUTOMATED COUNT: 13 % (ref 12.3–15.4)
GLOBULIN SER CALC-MCNC: 2.2 GM/DL
GLUCOSE SERPL-MCNC: 83 MG/DL (ref 65–99)
GLUCOSE UR QL STRIP: NEGATIVE
HCT VFR BLD AUTO: 41.4 % (ref 37.5–51)
HDLC SERPL-MCNC: 32 MG/DL (ref 40–60)
HGB BLD-MCNC: 14.2 G/DL (ref 13–17.7)
HGB UR QL STRIP: NEGATIVE
KETONES UR QL STRIP: NEGATIVE
LDLC SERPL CALC-MCNC: 97 MG/DL (ref 0–100)
LEUKOCYTE ESTERASE UR QL STRIP: NEGATIVE
MCH RBC QN AUTO: 31.5 PG (ref 26.6–33)
MCHC RBC AUTO-ENTMCNC: 34.3 G/DL (ref 31.5–35.7)
MCV RBC AUTO: 91.8 FL (ref 79–97)
NITRITE UR QL STRIP: NEGATIVE
PH UR STRIP: 6.5 [PH] (ref 5–8)
PLATELET # BLD AUTO: 227 10*3/MM3 (ref 140–450)
POTASSIUM SERPL-SCNC: 4.1 MMOL/L (ref 3.5–5.2)
PROT SERPL-MCNC: 7.2 G/DL (ref 6–8.5)
PROT UR QL STRIP: NEGATIVE
RBC # BLD AUTO: 4.51 10*6/MM3 (ref 4.14–5.8)
SODIUM SERPL-SCNC: 137 MMOL/L (ref 136–145)
SP GR UR STRIP: 1.01 (ref 1–1.03)
TESTOST FREE SERPL-MCNC: 12.3 PG/ML (ref 8.7–25.1)
TESTOST SERPL-MCNC: 546 NG/DL (ref 264–916)
TRIGL SERPL-MCNC: 89 MG/DL (ref 0–150)
TSH SERPL DL<=0.005 MIU/L-ACNC: 1.53 UIU/ML (ref 0.27–4.2)
UROBILINOGEN UR STRIP-MCNC: NORMAL MG/DL
VLDLC SERPL CALC-MCNC: 17 MG/DL (ref 5–40)
WBC # BLD AUTO: 9.66 10*3/MM3 (ref 3.4–10.8)

## 2023-06-02 ENCOUNTER — HOSPITAL ENCOUNTER (EMERGENCY)
Facility: HOSPITAL | Age: 28
Discharge: HOME | End: 2023-06-02
Attending: EMERGENCY MEDICINE
Payer: COMMERCIAL

## 2023-06-02 ENCOUNTER — OFFICE VISIT (OUTPATIENT)
Dept: FAMILY MEDICINE CLINIC | Facility: CLINIC | Age: 28
End: 2023-06-02

## 2023-06-02 VITALS
HEIGHT: 74 IN | BODY MASS INDEX: 35.16 KG/M2 | SYSTOLIC BLOOD PRESSURE: 147 MMHG | OXYGEN SATURATION: 95 % | TEMPERATURE: 97.4 F | HEART RATE: 105 BPM | RESPIRATION RATE: 18 BRPM | DIASTOLIC BLOOD PRESSURE: 97 MMHG | WEIGHT: 274 LBS

## 2023-06-02 VITALS
TEMPERATURE: 98 F | HEART RATE: 79 BPM | WEIGHT: 272 LBS | OXYGEN SATURATION: 98 % | HEIGHT: 71 IN | BODY MASS INDEX: 38.08 KG/M2 | SYSTOLIC BLOOD PRESSURE: 116 MMHG | DIASTOLIC BLOOD PRESSURE: 80 MMHG

## 2023-06-02 DIAGNOSIS — I10 ESSENTIAL HYPERTENSION: ICD-10-CM

## 2023-06-02 DIAGNOSIS — H18.821 CORNEAL ABRASION OF RIGHT EYE DUE TO CONTACT LENS: Primary | ICD-10-CM

## 2023-06-02 DIAGNOSIS — Z00.00 ROUTINE ADULT HEALTH MAINTENANCE: Primary | ICD-10-CM

## 2023-06-02 PROCEDURE — 99283 EMERGENCY DEPT VISIT LOW MDM: CPT

## 2023-06-02 PROCEDURE — 99395 PREV VISIT EST AGE 18-39: CPT | Performed by: FAMILY MEDICINE

## 2023-06-02 RX ORDER — PROPARACAINE HYDROCHLORIDE 5 MG/ML
2 SOLUTION/ DROPS OPHTHALMIC ONCE
Status: COMPLETED | OUTPATIENT
Start: 2023-06-02 | End: 2023-06-02

## 2023-06-02 RX ORDER — CIPROFLOXACIN HYDROCHLORIDE 3.5 MG/ML
2 SOLUTION/ DROPS TOPICAL ONCE
Status: COMPLETED | OUTPATIENT
Start: 2023-06-02 | End: 2023-06-02

## 2023-06-02 RX ORDER — CIPROFLOXACIN HYDROCHLORIDE 3.5 MG/ML
1 SOLUTION/ DROPS TOPICAL EVERY 4 HOURS
Qty: 2.5 ML | Refills: 0 | Status: SHIPPED | OUTPATIENT
Start: 2023-06-02 | End: 2023-06-02 | Stop reason: SDUPTHER

## 2023-06-02 RX ORDER — CIPROFLOXACIN HYDROCHLORIDE 3.5 MG/ML
2 SOLUTION/ DROPS TOPICAL EVERY 4 HOURS
Qty: 5 ML | Refills: 0 | Status: SHIPPED | OUTPATIENT
Start: 2023-06-02 | End: 2023-06-07

## 2023-06-02 RX ORDER — LISINOPRIL 20 MG/1
20 TABLET ORAL DAILY
Qty: 90 TABLET | Refills: 3 | Status: SHIPPED | OUTPATIENT
Start: 2023-06-02

## 2023-06-02 RX ADMIN — FLUORESCEIN SODIUM 1 STRIP: 1 STRIP OPHTHALMIC at 20:50

## 2023-06-02 RX ADMIN — CIPROFLOXACIN 2 DROP: 3 SOLUTION OPHTHALMIC at 21:09

## 2023-06-02 RX ADMIN — PROPARACAINE HYDROCHLORIDE 2 DROP: 5 SOLUTION/ DROPS OPHTHALMIC at 20:49

## 2023-06-02 NOTE — PROGRESS NOTES
"  Chief Complaint   Patient presents with   • Annual Exam   • Eye Drainage     Right eye       Subjective   Mruray Regalado is a 28 y.o. male and is here for a yearly physical exam. The patient reports no problems.    Do you take any herbs or supplements that were not prescribed by a doctor? no. If so, these will be added to active medication list.    The following portions of the patient's history were reviewed and updated as appropriate: allergies, current medications, past family history, past medical history, past social history, past surgical history and problem list.    Social and Family and Surgical History reviewed and updated today, see Rooming tab.    Health History, Preventive Measures and Vaccination flow sheets reviewed and updated today.    Patient's current medical chart in Epic; including previous office notes, Mychart messages, recent phone calls, imaging, labs, specialist's evaluation either in notes or in Media tab reviewed today.    Other outside pertinent medical information also reviewed thru Care Everywhere function is also reviewed today.    Review of Systems  Review of Systems  A comprehensive review of systems was negative.    Vitals:    06/02/23 0815   BP: 116/80   Pulse: 79   Temp: 98 °F (36.7 °C)   SpO2: 98%   Weight: 123 kg (272 lb)   Height: 181.6 cm (71.5\")     Body mass index is 37.41 kg/m².      General Appearance:  Alert, cooperative, no distress, appears stated age   Head:  Normocephalic, without obvious abnormality, atraumatic   Eyes:  PERRL, conjunctiva/corneas clear, EOM's intact.   Ears:  Normal TM's and external ear canals, both ears   Nose: Nares normal, septum midline, mucosa normal, no drainage or sinus tenderness   Throat: Lips, mucosa, and tongue normal; teeth and gums normal   Neck: Supple, symmetrical, trachea midline, no adenopathy;   thyroid: No enlargement/tenderness/nodules; no carotid  bruit   Back:  Symmetric, no curvature, ROM normal, no CVA tenderness "   Lungs:  Clear to auscultation bilaterally, respirations unlabored   Chest wall:  No tenderness or deformity   Heart:  Regular rate and rhythm, S1 and S2 normal, no murmur, rub or gallop   Abdomen:  Soft, non-tender, bowel sounds active all four quadrants,   no masses, no organomegaly   Rectal:        Extremities: Extremities normal, atraumatic, no cyanosis or edema   Pulses: 2+ and symmetric all extremities   Skin: Skin color, texture, turgor normal, no rashes or lesions   Lymph nodes: Cervical, supraclavicular, and axillary nodes normal   Neurologic: CNII-XII intact. Normal strength, sensation.          Results for orders placed or performed in visit on 05/19/23   CBC (No Diff)    Specimen: Blood   Result Value Ref Range    WBC 9.66 3.40 - 10.80 10*3/mm3    RBC 4.51 4.14 - 5.80 10*6/mm3    Hemoglobin 14.2 13.0 - 17.7 g/dL    Hematocrit 41.4 37.5 - 51.0 %    MCV 91.8 79.0 - 97.0 fL    MCH 31.5 26.6 - 33.0 pg    MCHC 34.3 31.5 - 35.7 g/dL    RDW 13.0 12.3 - 15.4 %    Platelets 227 140 - 450 10*3/mm3   Comprehensive Metabolic Panel    Specimen: Blood   Result Value Ref Range    Glucose 83 65 - 99 mg/dL    BUN 13 6 - 20 mg/dL    Creatinine 0.95 0.76 - 1.27 mg/dL    EGFR Result 110.4 >60.0 mL/min/1.73    BUN/Creatinine Ratio 13.7 7.0 - 25.0    Sodium 137 136 - 145 mmol/L    Potassium 4.1 3.5 - 5.2 mmol/L    Chloride 101 98 - 107 mmol/L    Total CO2 23.9 22.0 - 29.0 mmol/L    Calcium 9.9 8.6 - 10.5 mg/dL    Total Protein 7.2 6.0 - 8.5 g/dL    Albumin 5.0 3.5 - 5.2 g/dL    Globulin 2.2 gm/dL    A/G Ratio 2.3 g/dL    Total Bilirubin 0.8 0.0 - 1.2 mg/dL    Alkaline Phosphatase 89 39 - 117 U/L    AST (SGOT) 18 1 - 40 U/L    ALT (SGPT) 18 1 - 41 U/L   Lipid Panel    Specimen: Blood   Result Value Ref Range    Total Cholesterol 146 0 - 200 mg/dL    Triglycerides 89 0 - 150 mg/dL    HDL Cholesterol 32 (L) 40 - 60 mg/dL    VLDL Cholesterol Simon 17 5 - 40 mg/dL    LDL Chol Calc (NIH) 97 0 - 100 mg/dL   Testosterone, Free, Total     Specimen: Blood   Result Value Ref Range    Testosterone, Total 546 264 - 916 ng/dL    Testosterone, Free 12.3 8.7 - 25.1 pg/mL   TSH Rfx On Abnormal To Free T4    Specimen: Blood   Result Value Ref Range    TSH 1.530 0.270 - 4.200 uIU/mL   Urinalysis With Microscopic If Indicated (No Culture) - Urine, Clean Catch    Specimen: Urine, Clean Catch   Result Value Ref Range    Specific Gravity, UA 1.008 1.005 - 1.030    pH, UA 6.5 5.0 - 8.0    Color, UA Yellow     Appearance, UA Clear Clear    Leukocytes, UA Negative Negative    Protein Negative Negative    Glucose, UA Negative Negative    Ketones Negative Negative    Blood, UA Negative Negative    Bilirubin, UA Negative Negative    Urobilinogen, UA Comment     Nitrite, UA Negative Negative     Assessment & Plan   Healthy male exam.  Diagnoses and all orders for this visit:    1. Routine adult health maintenance (Primary)    2. Essential hypertension  -     lisinopril (PRINIVIL,ZESTRIL) 20 MG tablet; Take 1 tablet by mouth Daily. Indications: High Blood Pressure Disorder  Dispense: 90 tablet; Refill: 3      1. Doing well  Labs ok  Tolerating the Wegovy for weight loss  2. Patient Counseling:  --Nutrition: Stressed importance of moderation in: sodium, caffeine, , saturated fat and cholesterol.  Discussed: caloric balance, sufficient intake of fresh fruits, vegetables, fiber, calcium, iron.  --Exercise: Stressed the importance of regular exercise.   --Substance Abuse: Discussed cessation and or reduction of tobacco, alcohol, or other drug use; driving or other dangerous activities under the influence.    --Dental health: Discussed importance of regular tooth brushing, flossing, and dental visits.  --Suggested having eyes and vision checked if needed or past due.  --Immunizations reviewed and given if needed.  --  3. Discussed the patient's BMI with him.  The BMI is above average; BMI management plan is completed  4. Follow up in 6 months    There are no Patient  Instructions on file for this visit.    Medications Discontinued During This Encounter   Medication Reason   • fluticasone (FLOVENT HFA) 44 MCG/ACT inhaler *Therapy completed   • lisinopril (PRINIVIL,ZESTRIL) 20 MG tablet Reorder        Dr. Son Hwang MD  [place], [place]  [facility]

## 2023-06-03 NOTE — ED PROVIDER NOTES
Subjective   History of Present Illness  History of Present Illness    Chief complaint: Eye pain    Location: Right eye    Quality/Severity: Burning    Timing/Onset/Duration: Started this morning upon awakening    Modifying Factors: Nothing makes it better    Associated Symptoms: No drainage, no change in vision    Narrative: This 28-year-old male presents with burning and right eye pain.  Patient feels like something might be in his eye.    PCP:Son Hwang MD  Review of Systems   Eyes: Positive for pain and redness.        Medication List      ASK your doctor about these medications    lisinopril 20 MG tablet  Commonly known as: PRINIVIL,ZESTRIL  Take 1 tablet by mouth Daily. Indications: High Blood Pressure Disorder     Wegovy 1 MG/0.5ML solution auto-injector  Generic drug: Semaglutide-Weight Management  Inject 0.5 mL under the skin into the appropriate area as directed 1 (One) Time Per Week for 4 doses. Indications: OBESITY            Past Medical History:   Diagnosis Date   • Class 2 severe obesity due to excess calories with serious comorbidity and body mass index (BMI) of 38.0 to 38.9 in adult    • GERD (gastroesophageal reflux disease) 2018   • H/O Bell's palsy     2015, RESOLVED WITH STEROIDS   • Hypertension    • Impingement syndrome of right shoulder    • Lactose intolerance    • Reactive airway disease 04/24/2019   • Right shoulder pain     SCHEDULED FOR SURGERY   • Shoulder injury     right   • Tear of right glenoid labrum 10/24/2017       Allergies   Allergen Reactions   • Pistachio Nut Other (See Comments)     SORE THROAT        Past Surgical History:   Procedure Laterality Date   • SHOULDER ARTHROSCOPY Right 11/15/2017    Procedure: SHOULDER ARTHROSCOPY, labral repair, and all associated procedures;  Surgeon: Negro Martinez MD;  Location: [facility];  Service:    • TONSILLECTOMY     • TOOTH EXTRACTION  05/2017       Family History   Problem Relation Age of Onset   • Diabetes Mother          type 2   • COPD Mother         smoker   • Hypertension Father    • Lung cancer Father    • Cancer Father         lung cancer   • No Known Problems Sister    • Alcohol abuse Brother    • No Known Problems Brother    • No Known Problems Brother    • No Known Problems Brother    • No Known Problems Daughter    • No Known Problems Daughter        Social History     Socioeconomic History   • Marital status:      Spouse name: Melyssa   • Number of children: 2   Tobacco Use   • Smoking status: Never   • Smokeless tobacco: Never   Vaping Use   • Vaping Use: Never used   Substance and Sexual Activity   • Alcohol use: Yes     Alcohol/week: 1.0 standard drink     Types: 1 Cans of beer per week     Comment: social   • Drug use: Never   • Sexual activity: Yes     Partners: Female     Birth control/protection: Condom, Coitus interruptus           Objective   Physical Exam  Vitals (The temperature is 98 °F, pulse 79, respirations 18, /80.  Room air pulse ox is 98%.) and nursing note reviewed.   Constitutional:       Appearance: Normal appearance.   Eyes:      Comments: The lids and lashes are normal.  The extraocular muscles intact.  The optic disc is sharp.  The retina is intact.  There is conjunctival injection, mild.  There is no hyphema or hypopyon.    The visual acuity is 20/30 in the affected.  After applying Alcaine drops, fluorescein staining reveals a corneal abrasion   Neurological:      Mental Status: He is alert.         Procedures           ED Course                                           MDM    Final diagnoses:   None       ED Disposition  ED Disposition     None          No follow-up provider specified.       Medication List      No changes were made to your prescriptions during this visit.          Fox Lucas MD  06/02/23 6761

## 2023-06-03 NOTE — DISCHARGE INSTRUCTIONS
Stay in a dark room for 2 days.  Take Motrin or Tylenol as needed as directed for pain.  Follow-up with Dr. Sosa on Monday for recheck.  Return to the emergency department if there is worsening pain, drainage, change in vision, worse in any way at all.

## 2023-12-09 ENCOUNTER — HOSPITAL ENCOUNTER (EMERGENCY)
Facility: HOSPITAL | Age: 28
Discharge: HOME | End: 2023-12-09
Attending: EMERGENCY MEDICINE
Payer: COMMERCIAL

## 2023-12-09 ENCOUNTER — APPOINTMENT (OUTPATIENT)
Dept: CT IMAGING | Facility: HOSPITAL | Age: 28
End: 2023-12-09
Payer: COMMERCIAL

## 2023-12-09 VITALS
HEART RATE: 70 BPM | WEIGHT: 260 LBS | RESPIRATION RATE: 15 BRPM | DIASTOLIC BLOOD PRESSURE: 83 MMHG | HEIGHT: 73 IN | BODY MASS INDEX: 34.46 KG/M2 | OXYGEN SATURATION: 98 % | TEMPERATURE: 98 F | SYSTOLIC BLOOD PRESSURE: 136 MMHG

## 2023-12-09 DIAGNOSIS — A08.4 VIRAL GASTROENTERITIS: Primary | ICD-10-CM

## 2023-12-09 LAB
ALBUMIN SERPL-MCNC: 4.7 G/DL (ref 3.5–5.2)
ALBUMIN/GLOB SERPL: 1.6 G/DL
ALP SERPL-CCNC: 93 U/L (ref 39–117)
ALT SERPL W P-5'-P-CCNC: 35 U/L (ref 1–41)
AMYLASE SERPL-CCNC: 49 U/L (ref 28–100)
ANION GAP SERPL CALCULATED.3IONS-SCNC: 13.9 MMOL/L (ref 5–15)
AST SERPL-CCNC: 20 U/L (ref 1–40)
BASOPHILS # BLD AUTO: 0.07 10*3/MM3 (ref 0–0.2)
BASOPHILS NFR BLD AUTO: 0.5 % (ref 0–1.5)
BILIRUB SERPL-MCNC: 0.8 MG/DL (ref 0–1.2)
BUN SERPL-MCNC: 13 MG/DL (ref 6–20)
BUN/CREAT SERPL: 13.5 (ref 7–25)
CALCIUM SPEC-SCNC: 9.6 MG/DL (ref 8.6–10.5)
CHLORIDE SERPL-SCNC: 103 MMOL/L (ref 98–107)
CO2 SERPL-SCNC: 23.1 MMOL/L (ref 22–29)
CREAT SERPL-MCNC: 0.96 MG/DL (ref 0.76–1.27)
DEPRECATED RDW RBC AUTO: 40.9 FL (ref 37–54)
EGFRCR SERPLBLD CKD-EPI 2021: 109 ML/MIN/1.73
EOSINOPHIL # BLD AUTO: 0.22 10*3/MM3 (ref 0–0.4)
EOSINOPHIL NFR BLD AUTO: 1.6 % (ref 0.3–6.2)
ERYTHROCYTE [DISTWIDTH] IN BLOOD BY AUTOMATED COUNT: 12.3 % (ref 12.3–15.4)
FLUAV RNA RESP QL NAA+PROBE: NOT DETECTED
FLUBV RNA RESP QL NAA+PROBE: NOT DETECTED
GLOBULIN UR ELPH-MCNC: 2.9 GM/DL
GLUCOSE SERPL-MCNC: 91 MG/DL (ref 65–99)
HCT VFR BLD AUTO: 44.4 % (ref 37.5–51)
HGB BLD-MCNC: 15.4 G/DL (ref 13–17.7)
IMM GRANULOCYTES # BLD AUTO: 0.07 10*3/MM3 (ref 0–0.05)
IMM GRANULOCYTES NFR BLD AUTO: 0.5 % (ref 0–0.5)
LIPASE SERPL-CCNC: 36 U/L (ref 13–60)
LYMPHOCYTES # BLD AUTO: 3.03 10*3/MM3 (ref 0.7–3.1)
LYMPHOCYTES NFR BLD AUTO: 22.6 % (ref 19.6–45.3)
MCH RBC QN AUTO: 31.6 PG (ref 26.6–33)
MCHC RBC AUTO-ENTMCNC: 34.7 G/DL (ref 31.5–35.7)
MCV RBC AUTO: 91 FL (ref 79–97)
MONOCYTES # BLD AUTO: 0.95 10*3/MM3 (ref 0.1–0.9)
MONOCYTES NFR BLD AUTO: 7.1 % (ref 5–12)
NEUTROPHILS NFR BLD AUTO: 67.7 % (ref 42.7–76)
NEUTROPHILS NFR BLD AUTO: 9.09 10*3/MM3 (ref 1.7–7)
NRBC BLD AUTO-RTO: 0 /100 WBC (ref 0–0.2)
PLATELET # BLD AUTO: 263 10*3/MM3 (ref 140–450)
PMV BLD AUTO: 11.3 FL (ref 6–12)
POTASSIUM SERPL-SCNC: 4.2 MMOL/L (ref 3.5–5.2)
PROT SERPL-MCNC: 7.6 G/DL (ref 6–8.5)
RBC # BLD AUTO: 4.88 10*6/MM3 (ref 4.14–5.8)
SARS-COV-2 RNA RESP QL NAA+PROBE: NOT DETECTED
SODIUM SERPL-SCNC: 140 MMOL/L (ref 136–145)
WBC NRBC COR # BLD AUTO: 13.43 10*3/MM3 (ref 3.4–10.8)

## 2023-12-09 PROCEDURE — 80053 COMPREHEN METABOLIC PANEL: CPT | Performed by: EMERGENCY MEDICINE

## 2023-12-09 PROCEDURE — 25810000003 LACTATED RINGERS SOLUTION: Performed by: EMERGENCY MEDICINE

## 2023-12-09 PROCEDURE — 25510000001 IOPAMIDOL PER 1 ML: Performed by: EMERGENCY MEDICINE

## 2023-12-09 PROCEDURE — 83690 ASSAY OF LIPASE: CPT | Performed by: EMERGENCY MEDICINE

## 2023-12-09 PROCEDURE — 25010000002 ONDANSETRON PER 1 MG

## 2023-12-09 PROCEDURE — 85025 COMPLETE CBC W/AUTO DIFF WBC: CPT | Performed by: EMERGENCY MEDICINE

## 2023-12-09 PROCEDURE — 74177 CT ABD & PELVIS W/CONTRAST: CPT

## 2023-12-09 PROCEDURE — 87636 SARSCOV2 & INF A&B AMP PRB: CPT | Performed by: EMERGENCY MEDICINE

## 2023-12-09 PROCEDURE — 82150 ASSAY OF AMYLASE: CPT | Performed by: EMERGENCY MEDICINE

## 2023-12-09 PROCEDURE — 96375 TX/PRO/DX INJ NEW DRUG ADDON: CPT

## 2023-12-09 PROCEDURE — 25010000002 MORPHINE PER 10 MG

## 2023-12-09 PROCEDURE — 99285 EMERGENCY DEPT VISIT HI MDM: CPT

## 2023-12-09 PROCEDURE — 25010000002 HYDROMORPHONE 1 MG/ML SOLUTION: Performed by: EMERGENCY MEDICINE

## 2023-12-09 PROCEDURE — 96374 THER/PROPH/DIAG INJ IV PUSH: CPT

## 2023-12-09 RX ORDER — PANTOPRAZOLE SODIUM 40 MG/10ML
40 INJECTION, POWDER, LYOPHILIZED, FOR SOLUTION INTRAVENOUS ONCE
Status: COMPLETED | OUTPATIENT
Start: 2023-12-09 | End: 2023-12-09

## 2023-12-09 RX ORDER — DICYCLOMINE HCL 20 MG
20 TABLET ORAL EVERY 6 HOURS PRN
Qty: 30 TABLET | Refills: 0 | Status: SHIPPED | OUTPATIENT
Start: 2023-12-09

## 2023-12-09 RX ORDER — DIPHENOXYLATE HYDROCHLORIDE AND ATROPINE SULFATE 2.5; .025 MG/1; MG/1
1 TABLET ORAL 4 TIMES DAILY PRN
Qty: 10 TABLET | Refills: 0 | Status: SHIPPED | OUTPATIENT
Start: 2023-12-09 | End: 2023-12-19

## 2023-12-09 RX ORDER — ONDANSETRON 4 MG/1
4 TABLET, ORALLY DISINTEGRATING ORAL EVERY 6 HOURS PRN
Qty: 20 TABLET | Refills: 0 | Status: SHIPPED | OUTPATIENT
Start: 2023-12-09

## 2023-12-09 RX ORDER — ONDANSETRON 2 MG/ML
4 INJECTION INTRAMUSCULAR; INTRAVENOUS ONCE
Status: COMPLETED | OUTPATIENT
Start: 2023-12-09 | End: 2023-12-09

## 2023-12-09 RX ORDER — SODIUM CHLORIDE 0.9 % (FLUSH) 0.9 %
10 SYRINGE (ML) INJECTION AS NEEDED
Status: DISCONTINUED | OUTPATIENT
Start: 2023-12-09 | End: 2023-12-10 | Stop reason: HOSPADM

## 2023-12-09 RX ORDER — ONDANSETRON 2 MG/ML
INJECTION INTRAMUSCULAR; INTRAVENOUS
Status: COMPLETED
Start: 2023-12-09 | End: 2023-12-09

## 2023-12-09 RX ADMIN — ONDANSETRON 4 MG: 2 INJECTION INTRAMUSCULAR; INTRAVENOUS at 20:39

## 2023-12-09 RX ADMIN — Medication 4 MG: at 20:39

## 2023-12-09 RX ADMIN — PANTOPRAZOLE SODIUM 40 MG: 40 INJECTION, POWDER, FOR SOLUTION INTRAVENOUS at 20:52

## 2023-12-09 RX ADMIN — MORPHINE SULFATE 4 MG: 4 INJECTION INTRAVENOUS at 20:39

## 2023-12-09 RX ADMIN — HYDROMORPHONE HYDROCHLORIDE 1 MG: 1 INJECTION, SOLUTION INTRAMUSCULAR; INTRAVENOUS; SUBCUTANEOUS at 21:37

## 2023-12-09 RX ADMIN — IOPAMIDOL 100 ML: 755 INJECTION, SOLUTION INTRAVENOUS at 21:53

## 2023-12-09 RX ADMIN — SODIUM CHLORIDE, POTASSIUM CHLORIDE, SODIUM LACTATE AND CALCIUM CHLORIDE 1000 ML: 600; 310; 30; 20 INJECTION, SOLUTION INTRAVENOUS at 20:39

## 2023-12-10 NOTE — ED NOTES
Pt reports diarrhea starting Monday of this week. Patient states vomiting started today with approx 4 episodes - even after clear liquids. Patient denies fever and chills.

## 2023-12-10 NOTE — ED PROVIDER NOTES
Subjective   History of Present Illness  Murray Regalado is a 28-year-old white male who present secondary to abdominal pain, nausea, vomiting and diarrhea x 4 days.  Onset of symptoms on 12/4/2023.  Diarrhea was the first symptom to develop.  This was followed by nausea, vomiting and abdominal pain.  Symptoms have continued until today.  Patient unable to keep liquids or solids down today.  The abdominal pain has progressively worsened.  Thus patient elected to come to the ED today for evaluation.    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    History provided by:  Patient      Review of Systems   Constitutional:  Positive for chills. Negative for fever.   HENT:  Negative for rhinorrhea.    Eyes:  Negative for redness.   Respiratory:  Negative for cough.    Cardiovascular:  Negative for chest pain.   Gastrointestinal:  Positive for abdominal pain, diarrhea, nausea and vomiting.   Genitourinary:  Negative for dysuria.   Musculoskeletal:  Negative for back pain.   Skin:  Negative for rash.   Neurological:  Negative for syncope.   All other systems reviewed and are negative.      Past Medical History:   Diagnosis Date    Class 2 severe obesity due to excess calories with serious comorbidity and body mass index (BMI) of 38.0 to 38.9 in adult     GERD (gastroesophageal reflux disease) 2018    H/O Bell's palsy     2015, RESOLVED WITH STEROIDS    Hypertension     Impingement syndrome of right shoulder     Lactose intolerance     Reactive airway disease 04/24/2019    Right shoulder pain     SCHEDULED FOR SURGERY    Shoulder injury     right    Tear of right glenoid labrum 10/24/2017       Allergies   Allergen Reactions    Pistachio Nut Other (See Comments)     SORE THROAT        Past Surgical History:   Procedure Laterality Date    SHOULDER ARTHROSCOPY Right 11/15/2017    Procedure: SHOULDER ARTHROSCOPY, labral repair, and all associated procedures;  Surgeon: Negro Martinez MD;  Location: [facility] OR;   Service:     TONSILLECTOMY      TOOTH EXTRACTION  05/2017       Family History   Problem Relation Age of Onset    Diabetes Mother         type 2    COPD Mother         smoker    Hypertension Father     Lung cancer Father     Cancer Father         lung cancer    No Known Problems Sister     Alcohol abuse Brother     No Known Problems Brother     No Known Problems Brother     No Known Problems Brother     No Known Problems Daughter     No Known Problems Daughter        Social History     Socioeconomic History    Marital status:      Spouse name: Melyssa    Number of children: 2   Tobacco Use    Smoking status: Never    Smokeless tobacco: Never   Vaping Use    Vaping Use: Never used   Substance and Sexual Activity    Alcohol use: Yes     Alcohol/week: 1.0 standard drink of alcohol     Types: 1 Cans of beer per week     Comment: social    Drug use: Never    Sexual activity: Yes     Partners: Female     Birth control/protection: Condom, Coitus interruptus           Objective   Physical Exam  Vitals and nursing note reviewed.   Constitutional:       General: He is not in acute distress.     Appearance: Normal appearance. He is well-developed. He is obese. He is not ill-appearing, toxic-appearing or diaphoretic.      Comments: 28-year-old white male lying in bed.  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  Vital signs unremarkable.  Patient friendly and cooperative.   HENT:      Head: Normocephalic and atraumatic.      Right Ear: Tympanic membrane, ear canal and external ear normal.      Left Ear: Tympanic membrane, ear canal and external ear normal.      Nose: Nose normal.      Mouth/Throat:      Mouth: Mucous membranes are moist.      Pharynx: Oropharynx is clear.   Eyes:      Extraocular Movements: Extraocular movements intact.      Conjunctiva/sclera: Conjunctivae normal.      Pupils: Pupils are equal, round, and reactive to light.   Cardiovascular:      Rate and Rhythm: Normal rate and regular rhythm.       Heart sounds: Normal heart sounds. No murmur heard.     No friction rub. No gallop.   Pulmonary:      Effort: Pulmonary effort is normal. No respiratory distress.      Breath sounds: Normal breath sounds. No stridor. No wheezing or rales.   Abdominal:      General: There is no distension.      Palpations: Abdomen is soft. There is no mass.      Tenderness: There is abdominal tenderness (Moderate) in the periumbilical area. There is no guarding or rebound.      Hernia: No hernia is present.   Musculoskeletal:         General: Normal range of motion.      Cervical back: Normal range of motion and neck supple.   Skin:     General: Skin is warm and dry.      Findings: No erythema or rash.   Neurological:      General: No focal deficit present.      Mental Status: He is alert and oriented to person, place, and time.      Cranial Nerves: No cranial nerve deficit.      Deep Tendon Reflexes: Reflexes are normal and symmetric.   Psychiatric:         Mood and Affect: Mood normal.         Behavior: Behavior normal.         Procedures           ED Course  ED Course as of 12/09/23 2237   Sat Dec 09, 2023   2103 WBC(!): 13.43 [SS]   2103 Neutrophils Absolute(!): 9.09 [SS]   2103 Monocytes Absolute(!): 0.95 [SS]   2103 Immature Grans, Absolute(!): 0.07 [SS]   2104 CBC shows leukocytosis with left shift.  Remainder of labs pending.  Patient reports pain and nausea improved after medications. [SS]   2133 CMP and amylase and lipase unremarkable.  COVID and flu swabs negative. [SS]   2134 Patient reports abdominal pain is worsening.  Giving additional pain medication.  Awaiting CT. [SS]   2219 Patient had much better response with the Dilaudid.  Pain almost completely resolved.  Resting comfortably in bed.  Discussed results thus far.  Awaiting CT results. [SS]   2229 CT suggest ileus.  Otherwise unremarkable. [SS]   2232 Discussed with at length with patient all results, diagnoses, treatment as well as indication return to the ED.   Will treat for viral gastroenteritis.  Will DC home.    Prescription  1-Zofran ODT  2-Lomotil  3-Bentyl [SS]      ED Course User Index  [SS] Denis Lorenzo MD                                 Labs Reviewed   CBC WITH AUTO DIFFERENTIAL - Abnormal; Notable for the following components:       Result Value    WBC 13.43 (*)     Neutrophils, Absolute 9.09 (*)     Monocytes, Absolute 0.95 (*)     Immature Grans, Absolute 0.07 (*)     All other components within normal limits   COVID-19 AND FLU A/B, NP SWAB IN TRANSPORT MEDIA 1 HR TAT - Normal    Narrative:     Fact sheet for providers: https://www.fda.gov/media/394439/download    Fact sheet for patients: https://www.fda.gov/media/185826/download    Test performed by PCR.   LIPASE - Normal   AMYLASE - Normal   COMPREHENSIVE METABOLIC PANEL    Narrative:     GFR Normal >60  Chronic Kidney Disease <60  Kidney Failure <15     CBC AND DIFFERENTIAL    Narrative:     The following orders were created for panel order CBC & Differential.  Procedure                               Abnormality         Status                     ---------                               -----------         ------                     CBC Auto Differential[[id number]]        Abnormal            Final result                 Please view results for these tests on the individual orders.     CT Abdomen Pelvis With Contrast    Result Date: 12/9/2023  Narrative: CT abdomen and pelvis with contrast   12/9/2023 HISTORY: Generalized bowel pain with nausea vomiting diarrhea for 4 days COMPARISON: NONE TECHNIQUE:  CT of Abdomen and Pelvis with contrast performed.  Sagittal and Coronal reconstructions performed. Radiation dose reduction techniques included automated exposure control or exposure modulation based on body size. Radiation audit for CT and nuclear cardiology exams in the last 12 months: 0. FINDINGS:  Abdomen: Lung bases are clear. Liver, gallbladder, spleen, pancreas, adrenal glands and kidneys are normal  in appearance. Aorta is normal in size. There is no adenopathy appendix is normal. The small bowel is slightly distended with fluid as is the colon.  Pelvis: Bladder and prostate gland are normal. Bones are unremarkable.      Impression: Normal appendix There is fluid slightly distending the small bowel and large bowel which may represent ileus Otherwise normal Signer Name: Adair Chamberlain MD Signed: 12/9/2023 10:17 PM EST [facility]     My differential diagnosis for abdominal pain includes but is not limited to:  Gastritis, gastroenteritis, peptic ulcer disease, GERD, esophageal perforation, acute appendicitis, mesenteric adenitis, Meckel’s diverticulum, epiploic appendagitis, diverticulitis, colon cancer, ulcerative colitis, Crohn’s disease, intussusception, small bowel obstruction, adhesions, ischemic bowel, perforated viscus, ileus, obstipation, biliary colic, cholecystitis, cholelithiasis, Homar-Ronnie Mk, hepatitis, pancreatitis, common bile duct obstruction, cholangitis, bile leak, splenic trauma, splenic rupture, splenic infarction, splenic abscess, abdominal wall hematoma, abdominal wall abscess, intra-abdominal abscess, ascites, spontaneous bacterial peritonitis, hernia, UTI, cystitis, prostatitis, ureterolithiasis, urinary obstruction, AAA, myocardial infarction, pneumonia, cancer, porphyria, DKA, medications, sickle cell, viral syndrome, zoster                Medical Decision Making  Problems Addressed:  Viral gastroenteritis: complicated acute illness or injury    Amount and/or Complexity of Data Reviewed  Labs: ordered. Decision-making details documented in ED Course.  Radiology: ordered.    Risk  Prescription drug management.        Final diagnoses:   Viral gastroenteritis       ED Disposition  ED Disposition       ED Disposition   Discharge    Condition   Stable    Comment   --               Son Hwang MD  [address] [facility]  [address]  [phone]    Schedule an appointment as soon as possible for a visit in 1 week  for continued or worsened symptoms, Sooner if needed         Medication List        New Prescriptions      dicyclomine 20 MG tablet  Commonly known as: BENTYL  Take 1 tablet by mouth Every 6 (Six) Hours As Needed (abdominal pain) for up to 30 doses. 2 tabs if needed     diphenoxylate-atropine 2.5-0.025 MG per tablet  Commonly known as: Lomotil  Take 1 tablet by mouth 4 (Four) Times a Day As Needed for Diarrhea for up to 10 days.     ondansetron ODT 4 MG disintegrating tablet  Commonly known as: ZOFRAN-ODT  Place 1 tablet on the tongue Every 6 (Six) Hours As Needed for Nausea or Vomiting for up to 20 doses.               Where to Get Your Medications        These medications were sent to [id number] - [place] [address] AT [facility] & [address] - [phone]  - [phone] [address] [place] [address]      Phone: [phone]   dicyclomine 20 MG tablet  diphenoxylate-atropine 2.5-0.025 MG per tablet  ondansetron ODT 4 MG disintegrating tablet            Denis Lorenzo MD  12/09/23 4419

## 2023-12-10 NOTE — DISCHARGE INSTRUCTIONS
Medications as directed.  Plenty of fluids and rest.  Recommend half a Pedialyte and half a Gatorade or Powerade for hydration.  Follow-up with your PCP as above.  Return to ED for worsening symptoms, migration of pain to right lower quadrant, development of fever, chills, body aches, signs of systemic infection, medical emergencies.

## 2024-01-02 ENCOUNTER — OFFICE VISIT (OUTPATIENT)
Dept: FAMILY MEDICINE CLINIC | Facility: CLINIC | Age: 29
End: 2024-01-02
Payer: COMMERCIAL

## 2024-01-02 VITALS
BODY MASS INDEX: 37.37 KG/M2 | WEIGHT: 282 LBS | DIASTOLIC BLOOD PRESSURE: 80 MMHG | HEIGHT: 73 IN | SYSTOLIC BLOOD PRESSURE: 120 MMHG | TEMPERATURE: 97.5 F | OXYGEN SATURATION: 96 % | HEART RATE: 78 BPM

## 2024-01-02 DIAGNOSIS — E66.01 CLASS 2 SEVERE OBESITY DUE TO EXCESS CALORIES WITH SERIOUS COMORBIDITY AND BODY MASS INDEX (BMI) OF 38.0 TO 38.9 IN ADULT: Primary | ICD-10-CM

## 2024-01-02 PROCEDURE — 99213 OFFICE O/P EST LOW 20 MIN: CPT | Performed by: FAMILY MEDICINE

## 2024-01-02 RX ORDER — PHENTERMINE HYDROCHLORIDE 37.5 MG/1
37.5 TABLET ORAL
Qty: 30 TABLET | Refills: 0 | Status: SHIPPED | OUTPATIENT
Start: 2024-01-02

## 2024-01-02 NOTE — PROGRESS NOTES
"  Subjective   Murray Regalado is a 28 y.o. male who is here for   Chief Complaint   Patient presents with    Weight Loss     Change med   .   Answers submitted by the patient for this visit:  Primary Reason for Visit (Submitted on 12/27/2023)  What is the primary reason for your visit?: Other  Other (Submitted on 12/27/2023)  Please describe your symptoms.: Prescription change follow up.  Have you had these symptoms before?: Yes  How long have you been having these symptoms?: Greater than 2 weeks    History of Present Illness     Dante is here to follow-up on his efforts for weight loss.  Was not able to tolerate Wegovy due to lots of stomach GI issues.  Even at lower doses.  Continues to be on a reasonable diet and exercise when he can.  Would like to retry phentermine.    The following portions of the patient's history were reviewed and updated as appropriate: allergies, current medications, past family history, past medical history, past social history, past surgical history, and problem list.    Review of Systems    Objective   Vitals:    01/02/24 0843   BP: 120/80   Pulse: 78   Temp: 97.5 °F (36.4 °C)   SpO2: 96%   Weight: 128 kg (282 lb)   Height: 185.4 cm (73\")    Body mass index is 37.21 kg/m².    Physical Exam  Vitals reviewed.   Neurological:      Mental Status: He is alert.         Assessment & Plan   Diagnoses and all orders for this visit:    1. Class 2 severe obesity due to excess calories with serious comorbidity and body mass index (BMI) of 38.0 to 38.9 in adult (Primary)  -     phentermine (Adipex-P) 37.5 MG tablet; Take 1 tablet by mouth Every Morning Before Breakfast.  Dispense: 30 tablet; Refill: 0      There are no Patient Instructions on file for this visit.    Medications Discontinued During This Encounter   Medication Reason    Semaglutide-Weight Management (Wegovy) 2.4 MG/0.75ML solution auto-injector *Therapy completed    ondansetron ODT (ZOFRAN-ODT) 4 MG disintegrating tablet *Therapy " completed    dicyclomine (BENTYL) 20 MG tablet *Therapy completed        Return in about 1 month (around 2/2/2024) for new medication follow up.    Dr. Son Hwang  [place], [place]

## 2024-01-30 ENCOUNTER — OFFICE VISIT (OUTPATIENT)
Dept: FAMILY MEDICINE CLINIC | Facility: CLINIC | Age: 29
End: 2024-01-30
Payer: COMMERCIAL

## 2024-01-30 VITALS
BODY MASS INDEX: 35.92 KG/M2 | HEIGHT: 73 IN | OXYGEN SATURATION: 98 % | TEMPERATURE: 97.1 F | HEART RATE: 81 BPM | WEIGHT: 271 LBS | SYSTOLIC BLOOD PRESSURE: 120 MMHG | DIASTOLIC BLOOD PRESSURE: 70 MMHG

## 2024-01-30 DIAGNOSIS — E66.01 CLASS 2 SEVERE OBESITY DUE TO EXCESS CALORIES WITH SERIOUS COMORBIDITY AND BODY MASS INDEX (BMI) OF 38.0 TO 38.9 IN ADULT: Primary | ICD-10-CM

## 2024-01-30 DIAGNOSIS — I10 ESSENTIAL HYPERTENSION: ICD-10-CM

## 2024-01-30 DIAGNOSIS — Z00.00 ROUTINE ADULT HEALTH MAINTENANCE: ICD-10-CM

## 2024-01-30 RX ORDER — PHENTERMINE HYDROCHLORIDE 37.5 MG/1
37.5 TABLET ORAL
Qty: 30 TABLET | Refills: 5 | Status: SHIPPED | OUTPATIENT
Start: 2024-01-30

## 2024-01-30 NOTE — PROGRESS NOTES
"  Subjective   Murray Regalado is a 28 y.o. male who is here for   Chief Complaint   Patient presents with    Weight Check   .     History of Present Illness   Started Adipex last month. Weight is down 11 lbs.  BP looks good  No SE  Less hungry    The following portions of the patient's history were reviewed and updated as appropriate: allergies, current medications, past family history, past medical history, past social history, past surgical history, and problem list.    Review of Systems    Objective   Vitals:    01/30/24 0855   BP: 120/70   Pulse: 81   Temp: 97.1 °F (36.2 °C)   SpO2: 98%   Weight: 123 kg (271 lb)   Height: 185.4 cm (72.99\")      Physical Exam  Vitals reviewed.   Neurological:      Mental Status: He is alert.   Psychiatric:         Mood and Affect: Mood normal.       Body mass index is 35.76 kg/m².      Assessment & Plan   Diagnoses and all orders for this visit:    1. Class 2 severe obesity due to excess calories with serious comorbidity and body mass index (BMI) of 38.0 to 38.9 in adult (Primary)  -     phentermine (Adipex-P) 37.5 MG tablet; Take 1 tablet by mouth Every Morning Before Breakfast.  Dispense: 30 tablet; Refill: 5    2. Essential hypertension  -     CBC (No Diff); Future  -     Comprehensive Metabolic Panel; Future  -     Lipid Panel; Future  -     TSH Rfx On Abnormal To Free T4; Future  -     Urinalysis With Microscopic If Indicated (No Culture) - Urine, Clean Catch; Future  -     Testosterone, Free, Total; Future    3. Routine adult health maintenance  -     CBC (No Diff); Future  -     Comprehensive Metabolic Panel; Future  -     Lipid Panel; Future  -     TSH Rfx On Abnormal To Free T4; Future  -     Urinalysis With Microscopic If Indicated (No Culture) - Urine, Clean Catch; Future  -     Testosterone, Free, Total; Future    Will preorder labs.    There are no Patient Instructions on file for this visit.    Medications Discontinued During This Encounter   Medication Reason "    phentermine (Adipex-P) 37.5 MG tablet Reorder        Return in about 6 months (around 7/30/2024) for Annual physical, blood pressure.    Dr. Son Hwang  [place], [place]

## 2024-02-21 ENCOUNTER — HOSPITAL ENCOUNTER (EMERGENCY)
Facility: HOSPITAL | Age: 29
Discharge: HOME | End: 2024-02-22
Attending: EMERGENCY MEDICINE
Payer: COMMERCIAL

## 2024-02-21 DIAGNOSIS — S33.5XXA LUMBAR SPRAIN, INITIAL ENCOUNTER: Primary | ICD-10-CM

## 2024-02-21 PROCEDURE — 96372 THER/PROPH/DIAG INJ SC/IM: CPT

## 2024-02-21 PROCEDURE — 99283 EMERGENCY DEPT VISIT LOW MDM: CPT

## 2024-02-21 PROCEDURE — 96374 THER/PROPH/DIAG INJ IV PUSH: CPT

## 2024-02-21 PROCEDURE — 25010000002 HYDROMORPHONE 1 MG/ML SOLUTION: Performed by: EMERGENCY MEDICINE

## 2024-02-21 PROCEDURE — 25010000002 KETOROLAC TROMETHAMINE PER 15 MG: Performed by: EMERGENCY MEDICINE

## 2024-02-21 PROCEDURE — 63710000001 ONDANSETRON ODT 4 MG TABLET DISPERSIBLE: Performed by: EMERGENCY MEDICINE

## 2024-02-21 RX ORDER — ONDANSETRON 4 MG/1
8 TABLET, ORALLY DISINTEGRATING ORAL ONCE
Status: COMPLETED | OUTPATIENT
Start: 2024-02-21 | End: 2024-02-21

## 2024-02-21 RX ORDER — KETOROLAC TROMETHAMINE 30 MG/ML
30 INJECTION, SOLUTION INTRAMUSCULAR; INTRAVENOUS ONCE
Status: COMPLETED | OUTPATIENT
Start: 2024-02-21 | End: 2024-02-21

## 2024-02-21 RX ORDER — CYCLOBENZAPRINE HCL 10 MG
10 TABLET ORAL ONCE
Status: COMPLETED | OUTPATIENT
Start: 2024-02-21 | End: 2024-02-21

## 2024-02-21 RX ADMIN — KETOROLAC TROMETHAMINE 30 MG: 30 INJECTION, SOLUTION INTRAMUSCULAR; INTRAVENOUS at 22:14

## 2024-02-21 RX ADMIN — ONDANSETRON 8 MG: 4 TABLET, ORALLY DISINTEGRATING ORAL at 22:58

## 2024-02-21 RX ADMIN — HYDROMORPHONE HYDROCHLORIDE 1 MG: 1 INJECTION, SOLUTION INTRAMUSCULAR; INTRAVENOUS; SUBCUTANEOUS at 23:03

## 2024-02-21 RX ADMIN — CYCLOBENZAPRINE 10 MG: 10 TABLET, FILM COATED ORAL at 22:12

## 2024-02-21 NOTE — Clinical Note
JOSE M POTTER  [facility] EMERGENCY DEPARTMENT  [address] [place] [address]  Phone: [phone]    Murray Regalado was seen and treated in our emergency department on 2/21/2024.  He may return to work on 02/24/2024.         Thank you for choosing [facility].    Fox Lucas MD

## 2024-02-22 VITALS
TEMPERATURE: 98 F | WEIGHT: 265 LBS | BODY MASS INDEX: 35.89 KG/M2 | HEART RATE: 77 BPM | SYSTOLIC BLOOD PRESSURE: 128 MMHG | DIASTOLIC BLOOD PRESSURE: 77 MMHG | OXYGEN SATURATION: 97 % | RESPIRATION RATE: 18 BRPM | HEIGHT: 72 IN

## 2024-02-22 RX ORDER — CYCLOBENZAPRINE HCL 10 MG
10 TABLET ORAL 3 TIMES DAILY PRN
Qty: 30 TABLET | Refills: 0 | Status: SHIPPED | OUTPATIENT
Start: 2024-02-22

## 2024-02-22 RX ORDER — HYDROCODONE BITARTRATE AND ACETAMINOPHEN 5; 325 MG/1; MG/1
1 TABLET ORAL EVERY 6 HOURS PRN
Qty: 30 TABLET | Refills: 0 | Status: SHIPPED | OUTPATIENT
Start: 2024-02-22

## 2024-02-22 NOTE — DISCHARGE INSTRUCTIONS
Ice the back for 20 minutes every 2-3 hours while awake for 2 3 days.  Take the Flexeril as needed as prescribed.  Take the Norco as needed as prescribed.  Take Colace as needed as directed for constipation if you are taking the Norco for pain.  Follow-up with Dr. Chau in 1 week.  Rest.  Return to emergency department if there is increased pain, numbness, tingling, weakness, change in bladder or bowel function, worse in any way at all.

## 2024-02-22 NOTE — ED PROVIDER NOTES
Subjective   History of Present Illness    Chief complaint: Back pain    Location: Upper paralumbar    Quality/Severity: Moderate    Timing/Onset/Duration: Around 7 PM    Modifying Factors: Hurts to move    Associated Symptoms: No weakness in his leg.  He did have tingling in his legs.  No change in bladder or bowel function.    Narrative: This 28-year-old was at work and was going to reach for following monitor and strained his back.  Pain radiates into the upper back from the lower mid back and he has tingling into his bilateral legs.  The patient has never had back surgery.  He does not have metal in his back.  The patient has had sciatica before.    PCP:Son Hwang MD          Review of Systems   Musculoskeletal:  Positive for back pain.   Neurological:  Negative for weakness and numbness.         Past Medical History:   Diagnosis Date    Class 2 severe obesity due to excess calories with serious comorbidity and body mass index (BMI) of 38.0 to 38.9 in adult     GERD (gastroesophageal reflux disease) 2018    H/O Bell's palsy     2015, RESOLVED WITH STEROIDS    Hypertension     Impingement syndrome of right shoulder     Lactose intolerance     Reactive airway disease 04/24/2019    Right shoulder pain     SCHEDULED FOR SURGERY    Shoulder injury     right    Tear of right glenoid labrum 10/24/2017       Allergies   Allergen Reactions    Semaglutide(0.25 Or 0.5mg-Dos) GI Intolerance    Pistachio Nut Other (See Comments)     SORE THROAT        Past Surgical History:   Procedure Laterality Date    SHOULDER ARTHROSCOPY Right 11/15/2017    Procedure: SHOULDER ARTHROSCOPY, labral repair, and all associated procedures;  Surgeon: Negro Martinez MD;  Location: [facility];  Service:     TONSILLECTOMY      TOOTH EXTRACTION  05/2017       Family History   Problem Relation Age of Onset    Diabetes Mother         type 2    COPD Mother         smoker    Hypertension Father     Lung cancer Father     Cancer Father          lung cancer    No Known Problems Sister     Alcohol abuse Brother     No Known Problems Brother     No Known Problems Brother     No Known Problems Brother     No Known Problems Daughter     No Known Problems Daughter        Social History     Socioeconomic History    Marital status:      Spouse name: Meylssa    Number of children: 2   Tobacco Use    Smoking status: Never    Smokeless tobacco: Never   Vaping Use    Vaping Use: Never used   Substance and Sexual Activity    Alcohol use: Yes     Alcohol/week: 1.0 standard drink of alcohol     Types: 1 Cans of beer per week     Comment: social    Drug use: Never    Sexual activity: Yes     Partners: Female     Birth control/protection: Condom, Coitus interruptus           Objective   Physical Exam  Vitals (The temperature is 98 °F, pulse 89, respirations 18, /88, room air pulse ox 97%.) and nursing note reviewed.   Constitutional:       Appearance: Normal appearance.   HENT:      Head: Normocephalic and atraumatic.   Neck:      Comments: There is tenderness upon palpation of the upper paralumbar region.  There is no bony step-off or deformity or tenderness upon palpation of cervical or thoracic spine.  Cardiovascular:      Rate and Rhythm: Normal rate and regular rhythm.      Pulses: Normal pulses.      Heart sounds: Normal heart sounds. No murmur heard.     No friction rub. No gallop.   Pulmonary:      Effort: Pulmonary effort is normal.      Breath sounds: Normal breath sounds.   Abdominal:      General: Abdomen is flat. Bowel sounds are normal. There is no distension.      Palpations: Abdomen is soft. There is no mass.      Tenderness: There is no abdominal tenderness. There is no guarding or rebound.      Hernia: No hernia is present.   Musculoskeletal:         General: No swelling or tenderness. Normal range of motion.      Cervical back: Normal range of motion and neck supple.   Skin:     General: Skin is warm and dry.      Findings: No rash.    Neurological:      General: No focal deficit present.      Mental Status: He is alert. Mental status is at baseline. He is disoriented.         Procedures           ED Course    The patient did not get any relief from the Toradol.  He will be given Dilaudid.      00:15 EST, 02/22/24:  The patient feels better.  His vital signs were reviewed and are stable.  He has no complaints of numbness or tingling.  No change in bladder or bowel function.    00:17 EST, 02/22/24:  Patient's diagnosis of lumbar sprain was discussed with him.  Patient will be given a prescription for Flexeril and Norco.  He should take Colace as needed as directed for constipation if he is taking the Norco for pain.  The patient should follow-up with Son Stallings within 1 week.  He should return to emergency department if he has increased pain, numbness, tingling, weakness, change in bladder or bowel function, worse in any way at all.  The patient questions were answered, he will be discharged in good condition.                                         Medical Decision Making      Final diagnoses:   Lumbar sprain, initial encounter       ED Disposition  ED Disposition       None            No follow-up provider specified.       Medication List      No changes were made to your prescriptions during this visit.            Fox Lucas MD  02/22/24 0044

## 2024-02-28 ENCOUNTER — TRANSCRIBE ORDERS (OUTPATIENT)
Dept: ADMINISTRATIVE | Facility: HOSPITAL | Age: 29
End: 2024-02-28
Payer: COMMERCIAL

## 2024-02-28 DIAGNOSIS — R52 PAIN: Primary | ICD-10-CM

## 2024-02-29 ENCOUNTER — TRANSCRIBE ORDERS (OUTPATIENT)
Dept: PHYSICAL THERAPY | Facility: CLINIC | Age: 29
End: 2024-02-29
Payer: COMMERCIAL

## 2024-02-29 ENCOUNTER — TREATMENT (OUTPATIENT)
Dept: PHYSICAL THERAPY | Facility: CLINIC | Age: 29
End: 2024-02-29
Payer: COMMERCIAL

## 2024-02-29 DIAGNOSIS — S33.5XXD LUMBAR SPRAIN, SUBSEQUENT ENCOUNTER: Primary | ICD-10-CM

## 2024-02-29 DIAGNOSIS — M54.50 ACUTE BILATERAL LOW BACK PAIN WITHOUT SCIATICA: ICD-10-CM

## 2024-02-29 DIAGNOSIS — S33.5XXA LUMBAR SPRAIN, INITIAL ENCOUNTER: Primary | ICD-10-CM

## 2024-02-29 PROCEDURE — 97530 THERAPEUTIC ACTIVITIES: CPT | Performed by: PHYSICAL THERAPIST

## 2024-02-29 PROCEDURE — 97161 PT EVAL LOW COMPLEX 20 MIN: CPT | Performed by: PHYSICAL THERAPIST

## 2024-02-29 PROCEDURE — 97110 THERAPEUTIC EXERCISES: CPT | Performed by: PHYSICAL THERAPIST

## 2024-02-29 PROCEDURE — 97035 APP MDLTY 1+ULTRASOUND EA 15: CPT | Performed by: PHYSICAL THERAPIST

## 2024-02-29 NOTE — PROGRESS NOTES
"  Physical Therapy Initial Evaluation and Plan of Care    [facility]  [address] [place] [address]  [phone] (phone)  [phone] (fax)    Patient: Murray Regalado   : 1995  Diagnosis/ICD-10 Code:  Lumbar sprain, initial encounter [S33.5XXA]  Referring practitioner: Jacqui Ag APRN   Date of Initial Visit: 2024  Today's Date: 2024  Patient seen for 1 sessions           Subjective Evaluation    History of Present Illness  Date of onset: 2024  Mechanism of injury: Patient complains of low back pain that occurred when he strained his back trying to catch a falling monitor at work on 24. Patient felt a \"pop\" followed by immediate LBP. Pain radiates into his upper back and into the lower mid back. He also has tingling in B LE. No PMH of back pain or surgery.    Patient denies numbness tingling or changes in bowel/bladder function. He was diagnosed with a lumbar sprain and prescribed flexeril and norco.    MRI was scheduled for Tues. X-Ray showed L5/S1 shift    PMH: Sciatica    PLOF: job is physically demanding. Two toddlers at home (2/4 years old).      Patient Occupation: Paramedic and  Quality of life: good    Pain  Current pain rating: 3  At best pain ratin  At worst pain ratin  Location: low back  Quality: sharp and dull ache  Relieving factors: medications, relaxation, support, rest, change in position and heat  Aggravating factors: lifting and squatting (transitions, bending forward)  Progression: improved    Social Support  Lives in: one-story house  Lives with: young children and spouse    Diagnostic Tests  X-ray: abnormal  Abnormal MRI: waiting on results.    Patient Goals  Patient goals for therapy: increased strength, decreased pain, return to work, return to sport/leisure activities and increased motion  Patient goal: currently on light duty, wanting to return to full duty       Objective          Neurological " Testing     Sensation     Lumbar   Left   Intact: light touch    Right   Intact: light touch    Active Range of Motion     Lumbar   Flexion: 70 degrees   Extension: 20 degrees with pain  Left lateral flexion: 25 degrees with pain  Right lateral flexion: 25 degrees with pain  Left Hip   Flexion: WFL  External rotation (90/90): WFL    Right Hip   Flexion: WFL  External rotation (90/90): WFL    Strength/Myotome Testing     Left Hip   Planes of Motion   Flexion: 4+  Extension: 4 (mild pain)  Abduction: 4 (mild pain)  External rotation: 4+  Internal rotation: 4+ (mild pain with MMT)    Right Hip   Planes of Motion   Flexion: 4+  Extension: 4+  Abduction: 4  External rotation: 4+  Internal rotation: 4+    Left Knee   Flexion: 4 (pain with MMT)  Extension: 5    Right Knee   Flexion: 4+  Extension: 5    Left Ankle/Foot   Dorsiflexion: 5    Right Ankle/Foot   Dorsiflexion: 5    Tests     Lumbar     Left   Positive crossed SLR.   Negative passive SLR.     Right   Positive passive SLR.   Negative crossed SLR.     Left Pelvic Girdle/Sacrum   Positive: thigh thrust.     Right Pelvic Girdle/Sacrum   Positive: thigh thrust.     Left Hip   Negative Troy and scour.     Right Hip   Negative ROSALIA and scour.     Additional Tests Details  No LLD noted    Ambulation     Comments   No gait deficits        See Exercise, Manual, and Modality Logs for complete treatment.       Functional Outcome Score: TRENT=34% disability        Assessment & Plan       Assessment  Impairments: abnormal gait, abnormal or restricted ROM, activity intolerance, impaired physical strength, lacks appropriate home exercise program and pain with function   Functional limitations: lifting, sleeping, walking, uncomfortable because of pain, sitting and standing   Assessment details: Murray Regalado is a 28 y.o. year-old male referred to physical therapy for a lumbar sprain that occurred while reaching for a monitor at work on 2/21/24. He presents with a evolving  clinical presentation.  He has comorbidities of sciatic and personal factors of having a physically demanding job and caring for two toddlers at home which may affect his progress in the plan of care.  Signs and symptoms are consistent with physical therapy diagnosis of lumbar sprain. Pt was educated on course of treatment, possible reasons for pain, activity modifications, and use of ice/heat PRN. Pt was given a copy of HEP. Patient is appropriate for skilled physical therapy in order to reduce pain and increase ease with daily mobility.   Prognosis: good    Goals  Plan Goals: Short Term Goals for completion in 30 days:   -Patient will report good compliance with initial HEP  -Patient will demonstrate good core stabilization strength with PPTs to help reduce strain on low back  -Patient will increase lumbar extension to 30 degrees and lumbar flexion to 80 degrees (without increased pain) to allow patient to more easily complete daily ADLs.    LTGs for completion within 90 days:  -Patient will reduce perceived disability on Oswestry from 34% disability to <20% disability to improve quality of life  -Patient will improve 50% reduction in frequency and severity of radiating leg pain to indicate centralization of symptoms  -Patient will increase L LE strength to 4+/5 or greater (without pain) to increase LE stability during gait    Plan  Therapy options: will be seen for skilled therapy services  Planned modality interventions: TENS, iontophoresis, ultrasound, dry needling, cryotherapy, electrical stimulation/Russian stimulation, thermotherapy (hydrocollator packs) and traction  Planned therapy interventions: postural training, stretching, therapeutic activities, gait training, home exercise program, balance/weight-bearing training, flexibility, strengthening, functional ROM exercises, neuromuscular re-education, abdominal trunk stabilization and transfer training  Other planned therapy interventions: Aquatic  therapy  Frequency: 2x week (36 visits)  Plan details: Therapy for core stabilization, LE strength/stability, gait training, balance, and posture      Timed:  Manual Therapy:         mins  42332;  Therapeutic Exercise:    10     mins  43127;     Neuromuscular Logan:        mins  65339;    Therapeutic Activity:     12     mins  79068;     Gait Training:           mins  51021;     Ultrasound:     8     mins  64402;    Iontophoresis         mins 54332    Untimed:  Electrical Stimulation:         mins  65624 ( );  Traction:       mins  79927;   Dry Needling   (1-2 muscles)   _     mins 35671 (Self-pay)  Dry Needling (3-4 muscles)  _     mins 81294 (Self-pay)  Dry Needling Trial    _     mins DRYNDLTRIAL  (No Charge)  Low Eval     15     Mins  36139  Mod Eval          Mins  45112  High Eval                            Mins  29471  Re- Eval                           Mins  62244    Timed Treatment:   30   mins   Total Treatment:     45   mins    PT SIGNATURE: Katherine Gore PT     License Number: [place] [id number]    Electronically signed by Katherine Gore PT, 02/29/24, 10:03 AM EST    DATE TREATMENT INITIATED: 2/29/2024    Initial Certification  Certification Period: 5/29/2024  I certify that the therapy services are furnished while this patient is under my care.  The services outlined above are required by this patient, and will be reviewed every 90 days.     PHYSICIAN:  Jacqui Ag APRN   NPI:                                          DATE:     Please sign and return via fax to [phone] Thank you, [facility].

## 2024-03-04 ENCOUNTER — TREATMENT (OUTPATIENT)
Dept: PHYSICAL THERAPY | Facility: CLINIC | Age: 29
End: 2024-03-04
Payer: COMMERCIAL

## 2024-03-04 DIAGNOSIS — M54.50 ACUTE BILATERAL LOW BACK PAIN WITHOUT SCIATICA: ICD-10-CM

## 2024-03-04 DIAGNOSIS — S33.5XXA LUMBAR SPRAIN, INITIAL ENCOUNTER: Primary | ICD-10-CM

## 2024-03-04 PROCEDURE — 97035 APP MDLTY 1+ULTRASOUND EA 15: CPT | Performed by: PHYSICAL THERAPIST

## 2024-03-04 PROCEDURE — 97140 MANUAL THERAPY 1/> REGIONS: CPT | Performed by: PHYSICAL THERAPIST

## 2024-03-04 PROCEDURE — 97014 ELECTRIC STIMULATION THERAPY: CPT | Performed by: PHYSICAL THERAPIST

## 2024-03-04 PROCEDURE — 97110 THERAPEUTIC EXERCISES: CPT | Performed by: PHYSICAL THERAPIST

## 2024-03-04 NOTE — PROGRESS NOTES
Physical Therapy Daily Treatment Note      Patient: Murray Regalado   : 1995  Referring practitioner: Jacqui Ag,*  Date of Initial Visit: Type: THERAPY  Noted: 2024  Today's Date: 3/4/2024  Patient seen for 2 sessions         Murray Regalado reports: MRI scheduled for Thursday. Felt better the day of and after evaluation for 1 day.        Objective     Posterior rotation to L innominate.     See Exercise, Manual, and Modality Logs for complete treatment.       Assessment/Plan  Added Itband stretch and progressed core stability exercises. Continued US to L low back for pain control. Pt presents with L posterior innominate rotation; corrected with MET and added some gentle manual lumbar traction for decompression. Ice at end of session for pain control.       Progress per Plan of Care and Progress strengthening /stabilization /functional activity           Timed:  Manual Therapy:    8     mins  20053;  Therapeutic Exercise:    20     mins  88095;     Neuromuscular Logan:    -    mins  07180;    Therapeutic Activity:     -     mins  02253;     Gait Training:      -     mins  92159;     Ultrasound:     8     mins  47536;      Untimed:  Electrical Stimulation:    15     mins  54725 ( );  Mechanical Traction:    -     mins  50652;   Dry needling:      -     mins  16221/    Timed Treatment:   36   mins   Total Treatment:     55   mins    Eloisa Jasso PT  Physical Therapist    License #: [id number]

## 2024-03-07 ENCOUNTER — HOSPITAL ENCOUNTER (OUTPATIENT)
Dept: MRI IMAGING | Facility: HOSPITAL | Age: 29
Discharge: HOME | End: 2024-03-07
Admitting: NURSE PRACTITIONER
Payer: COMMERCIAL

## 2024-03-07 DIAGNOSIS — R52 PAIN: ICD-10-CM

## 2024-03-07 PROCEDURE — 72148 MRI LUMBAR SPINE W/O DYE: CPT

## 2024-03-08 ENCOUNTER — TREATMENT (OUTPATIENT)
Dept: PHYSICAL THERAPY | Facility: CLINIC | Age: 29
End: 2024-03-08
Payer: COMMERCIAL

## 2024-03-08 DIAGNOSIS — M54.50 ACUTE BILATERAL LOW BACK PAIN WITHOUT SCIATICA: ICD-10-CM

## 2024-03-08 DIAGNOSIS — S33.5XXA LUMBAR SPRAIN, INITIAL ENCOUNTER: Primary | ICD-10-CM

## 2024-03-08 PROCEDURE — 97035 APP MDLTY 1+ULTRASOUND EA 15: CPT | Performed by: PHYSICAL THERAPIST

## 2024-03-08 PROCEDURE — 97530 THERAPEUTIC ACTIVITIES: CPT | Performed by: PHYSICAL THERAPIST

## 2024-03-08 PROCEDURE — 97110 THERAPEUTIC EXERCISES: CPT | Performed by: PHYSICAL THERAPIST

## 2024-03-08 NOTE — PROGRESS NOTES
Physical Therapy Daily Treatment Note    [facility]  [address] [place] [address]  [phone] (phone)  [phone] (fax)    Patient: Murray Regalado   : 1995  Diagnosis/ICD-10 Code:  Lumbar sprain, initial encounter [S33.5XXA]  Referring practitioner: Jacqui Ag,*  Date of Initial Visit: Type: THERAPY  Noted: 2024  Today's Date: 3/8/2024  Patient seen for 3 sessions           Subjective   Back is doing better. Was sore the past two days.     MRI per [facility] chart review: no evidence of a focal herniation. Mild facet degenerative disease is present from L3 to S1. A mild central broad-based disc  osteophyte complex is present at L5-S1 with no evidence of herniation    Objective     See Exercise, Manual, and Modality Logs for complete treatment.     Assessment/Plan  No need for MET correct, patient's alignment appears WNL today. US seems to be helping to lessen the severity of his back pain. He is still on light duty at work but he returns to MD Tuesday who will determine when he can return to his normal work tasks. May consider progression of core strengthening next visit.          Timed:    Manual Therapy:         mins  22392;  Therapeutic Exercise:    12     mins  69082;     Neuromuscular Logan:        mins  90870;    Therapeutic Activity:     10     mins  37126;     Gait Training:           mins  13480;     Ultrasound:     8     mins  70569;    Electrical Stimulation:         mins  07636 ( );  Iontophoresis         mins 59564;  Aquatic Therapy         mins 78608;    Untimed:  Electrical Stimulation:         mins  46214 ( );  Traction:         mins  36538;   Dry Needling   (1-2 muscles)       mins  (Self-pay)  Dry Needling (3-4 muscles)        mins  (Self-pay)  Dry Needling Trial          mins DRYNDLTRIAL  (No Charge)    Timed Treatment:   30   mins   Total Treatment:     45   mins    Katherine Gore PT  Physical Therapist    [place]  License:[id number]

## 2024-08-06 ENCOUNTER — HOSPITAL ENCOUNTER (OUTPATIENT)
Dept: GENERAL RADIOLOGY | Facility: HOSPITAL | Age: 29
Discharge: HOME | End: 2024-08-06
Admitting: NURSE PRACTITIONER
Payer: COMMERCIAL

## 2024-08-06 DIAGNOSIS — R05.9 COUGH, UNSPECIFIED TYPE: ICD-10-CM

## 2024-08-06 PROCEDURE — 71046 X-RAY EXAM CHEST 2 VIEWS: CPT

## 2024-09-03 DIAGNOSIS — E66.01 CLASS 2 SEVERE OBESITY DUE TO EXCESS CALORIES WITH SERIOUS COMORBIDITY AND BODY MASS INDEX (BMI) OF 38.0 TO 38.9 IN ADULT: ICD-10-CM

## 2024-09-03 DIAGNOSIS — I10 ESSENTIAL HYPERTENSION: ICD-10-CM

## 2024-09-04 RX ORDER — PHENTERMINE HYDROCHLORIDE 37.5 MG/1
37.5 TABLET ORAL
Qty: 30 TABLET | Refills: 5 | Status: SHIPPED | OUTPATIENT
Start: 2024-09-04

## 2024-09-04 RX ORDER — LISINOPRIL 20 MG/1
20 TABLET ORAL DAILY
Qty: 90 TABLET | Refills: 3 | Status: SHIPPED | OUTPATIENT
Start: 2024-09-04

## 2024-11-27 ENCOUNTER — OFFICE VISIT (OUTPATIENT)
Dept: FAMILY MEDICINE CLINIC | Facility: CLINIC | Age: 29
End: 2024-11-27
Payer: COMMERCIAL

## 2024-11-27 VITALS
SYSTOLIC BLOOD PRESSURE: 118 MMHG | WEIGHT: 297 LBS | HEIGHT: 72 IN | HEART RATE: 96 BPM | DIASTOLIC BLOOD PRESSURE: 78 MMHG | BODY MASS INDEX: 40.23 KG/M2 | TEMPERATURE: 98 F | OXYGEN SATURATION: 98 %

## 2024-11-27 DIAGNOSIS — E66.01 CLASS 2 SEVERE OBESITY DUE TO EXCESS CALORIES WITH SERIOUS COMORBIDITY AND BODY MASS INDEX (BMI) OF 38.0 TO 38.9 IN ADULT: Primary | ICD-10-CM

## 2024-11-27 DIAGNOSIS — I10 ESSENTIAL HYPERTENSION: ICD-10-CM

## 2024-11-27 DIAGNOSIS — E66.812 CLASS 2 SEVERE OBESITY DUE TO EXCESS CALORIES WITH SERIOUS COMORBIDITY AND BODY MASS INDEX (BMI) OF 38.0 TO 38.9 IN ADULT: Primary | ICD-10-CM

## 2024-11-27 NOTE — PROGRESS NOTES
"  Chief Complaint   Patient presents with    Annual Exam    Weight Loss       Subjective   Murray Regalado is a 29 y.o. male and is here for a yearly physical exam. The patient reports problems - would like to look into having bariatric surgery done .    Would also like to have a coronary artery calcium score done    Do you take any herbs or supplements that were not prescribed by a doctor? no. If so, these will be added to active medication list.    The following portions of the patient's history were reviewed and updated as appropriate: allergies, current medications, past family history, past medical history, past social history, past surgical history, and problem list.    Social and Family and Surgical History reviewed and updated today.    Health and Surgical History, Preventive Measures and Vaccination flow sheets reviewed and updated today.    Patient's current medical chart in Epic; including previous office notes, Mychart messages, recent phone calls, imaging, labs, specialist's evaluation either in notes or in Media tab reviewed today.    Other outside pertinent medical information also reviewed thru Care Everywhere function is also reviewed today.    Review of Systems  Review of Systems  Pertinent items are noted in HPI.    Vitals:    11/27/24 1433   BP: 118/78   Pulse: 96   Temp: 98 °F (36.7 °C)   SpO2: 98%   Weight: 135 kg (297 lb)   Height: 182.9 cm (72\")     Body mass index is 40.28 kg/m².             General Appearance:  Alert, cooperative, no distress, appears stated age   Head:  Normocephalic, without obvious abnormality, atraumatic   Eyes:  PERRL, conjunctiva/corneas clear, EOM's intact.   Ears:  Normal TM's and external ear canals, both ears   Nose: Nares normal, septum midline, mucosa normal, no drainage or sinus tenderness   Throat: Lips, mucosa, and tongue normal; teeth and gums viewed   Neck: Supple, symmetrical,  no adenopathy;   thyroid: No enlargement/tenderness/nodules;   no " carotidbruit   Back:  Symmetric, no curvature, ROM normal, no CVA tenderness   Lungs:  Clear to auscultation bilaterally, respirations unlabored   Chest wall:  No tenderness or deformity   Heart:  Regular rate and rhythm, S1 and S2 normal, no murmur.   Abdomen:  Soft, non-tender, bowel sounds active all four quadrants,   no masses, no organomegaly   Rectal:        Extremities: Extremities normal, atraumatic, no cyanosis or edema   Pulses: 2+ and symmetric all extremities   Skin: Skin color, texture, turgor normal, no rashes. No suspicious lesions   Lymph nodes: Cervical, supraclavicular, and axillary nodes normal   Neurologic: CNII-XII intact. Normal strength, sensation.          Results for orders placed or performed during the hospital encounter of 08/06/24   Covid-19 + Flu A&B AG, Veritor ([id number])    Collection Time: 08/06/24 10:28 AM    Specimen: Swab   Result Value Ref Range    SARS Antigen Not Detected Not Detected, Presumptive Negative    Influenza A Antigen ANTHONY Not Detected Not Detected    Influenza B Antigen ANTHONY Not Detected Not Detected    Internal Control Passed Passed    Lot Number [id number]     Expiration Date 02/05/2025    POC Rapid Strep A    Collection Time: 08/06/24 10:38 AM    Specimen: Swab   Result Value Ref Range    Rapid Strep A Screen Negative     Internal Control Passed     Lot Number [id number]     Expiration Date 11/30/26      Assessment & Plan   Healthy male exam.  Diagnoses and all orders for this visit:    1. Class 2 severe obesity due to excess calories with serious comorbidity and body mass index (BMI) of 38.0 to 38.9 in adult (Primary)  -     Ambulatory Referral to Bariatric Surgery    2. Essential hypertension  -     CT Cardiac Calcium Score Without Dye; Future    Other orders  -     Tdap Vaccine => 8yo IM (BOOSTRIX/ADACEL)      1. .  2. Patient Counseling:  --Nutrition: Stressed importance of moderation in: sodium, caffeine, , saturated fat and cholesterol.  Discussed: caloric  balance, sufficient intake of fresh fruits, vegetables, fiber, calcium, iron.  --Exercise: Stressed the importance of regular exercise.   --Substance Abuse: Discussed cessation and or reduction of tobacco, alcohol, or other drug use; driving or other dangerous activities under the influence.    --Dental health: Discussed importance of regular tooth brushing, flossing, and dental visits.  --Suggested having eyes and vision checked if needed or past due.  --Immunizations reviewed and given if needed.  --  3. Discussed the patient's BMI with him.  The BMI is above average; BMI management plan is completed  4. Follow up in one year    There are no Patient Instructions on file for this visit.    Medications Discontinued During This Encounter   Medication Reason    Acetaminophen Extra Strength 500 MG tablet *Therapy completed    promethazine-dextromethorphan (PROMETHAZINE-DM) 6.25-15 MG/5ML syrup *Therapy completed    azithromycin (ZITHROMAX) 250 MG tablet *Therapy completed        Dr. Son Hwang MD  [place], [place]  [facility]

## 2024-12-17 ENCOUNTER — HOSPITAL ENCOUNTER (OUTPATIENT)
Dept: CT IMAGING | Facility: HOSPITAL | Age: 29
Discharge: HOME | End: 2024-12-17
Admitting: FAMILY MEDICINE

## 2024-12-17 DIAGNOSIS — I10 ESSENTIAL HYPERTENSION: ICD-10-CM

## 2024-12-17 PROCEDURE — 75571 CT HRT W/O DYE W/CA TEST: CPT

## 2024-12-23 ENCOUNTER — TRANSCRIBE ORDERS (OUTPATIENT)
Dept: CARDIOLOGY | Facility: CLINIC | Age: 29
End: 2024-12-23
Payer: COMMERCIAL

## 2024-12-23 DIAGNOSIS — Z00.00 PHYSICAL EXAM: Primary | ICD-10-CM

## 2025-01-08 ENCOUNTER — TELEPHONE (OUTPATIENT)
Dept: CARDIOLOGY | Facility: CLINIC | Age: 30
End: 2025-01-08
Payer: COMMERCIAL

## 2025-01-09 ENCOUNTER — HOSPITAL ENCOUNTER (OUTPATIENT)
Dept: CARDIOLOGY | Facility: HOSPITAL | Age: 30
Discharge: HOME | End: 2025-01-09

## 2025-01-09 DIAGNOSIS — Z00.00 PHYSICAL EXAM: ICD-10-CM

## 2025-01-09 LAB
BH CV STRESS BP STAGE 1: NORMAL
BH CV STRESS BP STAGE 2: NORMAL
BH CV STRESS BP STAGE 3: NORMAL
BH CV STRESS DURATION MIN STAGE 1: 3
BH CV STRESS DURATION MIN STAGE 2: 3
BH CV STRESS DURATION MIN STAGE 3: 3
BH CV STRESS DURATION MIN STAGE 4: 3
BH CV STRESS DURATION SEC STAGE 1: 0
BH CV STRESS DURATION SEC STAGE 2: 0
BH CV STRESS DURATION SEC STAGE 3: 0
BH CV STRESS DURATION SEC STAGE 4: 0
BH CV STRESS GRADE STAGE 1: 10
BH CV STRESS GRADE STAGE 2: 12
BH CV STRESS GRADE STAGE 3: 14
BH CV STRESS GRADE STAGE 4: 16
BH CV STRESS HR STAGE 1: 119
BH CV STRESS HR STAGE 2: 145
BH CV STRESS HR STAGE 3: 156
BH CV STRESS METS STAGE 1: 5
BH CV STRESS METS STAGE 2: 7.5
BH CV STRESS METS STAGE 3: 10
BH CV STRESS METS STAGE 4: 13.5
BH CV STRESS PROTOCOL 1: NORMAL
BH CV STRESS RECOVERY BP: NORMAL MMHG
BH CV STRESS RECOVERY HR: 102 BPM
BH CV STRESS SPEED STAGE 1: 1.7
BH CV STRESS SPEED STAGE 2: 2.5
BH CV STRESS SPEED STAGE 3: 3.4
BH CV STRESS SPEED STAGE 4: 4.2
BH CV STRESS STAGE 1: 1
BH CV STRESS STAGE 2: 2
BH CV STRESS STAGE 3: 3
BH CV STRESS STAGE 4: 4
MAXIMAL PREDICTED HEART RATE: 189 BPM
PERCENT MAX PREDICTED HR: 86.77 %
STRESS BASELINE BP: NORMAL MMHG
STRESS BASELINE HR: 93 BPM
STRESS PERCENT HR: 102 %
STRESS POST PEAK BP: NORMAL MMHG
STRESS POST PEAK HR: 164 BPM
STRESS TARGET HR: 161 BPM

## 2025-01-09 PROCEDURE — 93017 CV STRESS TEST TRACING ONLY: CPT

## 2025-03-22 DIAGNOSIS — E66.812 CLASS 2 SEVERE OBESITY DUE TO EXCESS CALORIES WITH SERIOUS COMORBIDITY AND BODY MASS INDEX (BMI) OF 38.0 TO 38.9 IN ADULT: ICD-10-CM

## 2025-03-22 DIAGNOSIS — E66.01 CLASS 2 SEVERE OBESITY DUE TO EXCESS CALORIES WITH SERIOUS COMORBIDITY AND BODY MASS INDEX (BMI) OF 38.0 TO 38.9 IN ADULT: ICD-10-CM

## 2025-03-22 RX ORDER — PHENTERMINE HYDROCHLORIDE 37.5 MG/1
37.5 TABLET ORAL
Qty: 30 TABLET | Refills: 5 | Status: SHIPPED | OUTPATIENT
Start: 2025-03-22

## 2025-06-30 DIAGNOSIS — Z13.29 SCREENING FOR THYROID DISORDER: ICD-10-CM

## 2025-06-30 DIAGNOSIS — E66.01 CLASS 2 SEVERE OBESITY DUE TO EXCESS CALORIES WITH SERIOUS COMORBIDITY AND BODY MASS INDEX (BMI) OF 38.0 TO 38.9 IN ADULT: ICD-10-CM

## 2025-06-30 DIAGNOSIS — Z13.220 ENCOUNTER FOR LIPID SCREENING FOR CARDIOVASCULAR DISEASE: ICD-10-CM

## 2025-06-30 DIAGNOSIS — R53.83 FATIGUE, UNSPECIFIED TYPE: ICD-10-CM

## 2025-06-30 DIAGNOSIS — Z13.6 ENCOUNTER FOR LIPID SCREENING FOR CARDIOVASCULAR DISEASE: ICD-10-CM

## 2025-06-30 DIAGNOSIS — I10 ESSENTIAL HYPERTENSION: Primary | ICD-10-CM

## 2025-06-30 DIAGNOSIS — E66.812 CLASS 2 SEVERE OBESITY DUE TO EXCESS CALORIES WITH SERIOUS COMORBIDITY AND BODY MASS INDEX (BMI) OF 38.0 TO 38.9 IN ADULT: ICD-10-CM

## 2025-07-02 ENCOUNTER — TREATMENT (OUTPATIENT)
Dept: PHYSICAL THERAPY | Facility: CLINIC | Age: 30
End: 2025-07-02
Payer: OTHER MISCELLANEOUS

## 2025-07-02 DIAGNOSIS — M25.561 ACUTE PAIN OF RIGHT KNEE: Primary | ICD-10-CM

## 2025-07-02 DIAGNOSIS — Z56.89 DIFFICULTY PERFORMING WORK ACTIVITIES: ICD-10-CM

## 2025-07-02 DIAGNOSIS — M23.303 OTHER MENISCUS DERANGEMENTS, UNSPECIFIED MEDIAL MENISCUS, RIGHT KNEE: ICD-10-CM

## 2025-07-02 PROCEDURE — 97014 ELECTRIC STIMULATION THERAPY: CPT | Performed by: PHYSICAL THERAPIST

## 2025-07-02 PROCEDURE — 97110 THERAPEUTIC EXERCISES: CPT | Performed by: PHYSICAL THERAPIST

## 2025-07-02 PROCEDURE — 97161 PT EVAL LOW COMPLEX 20 MIN: CPT | Performed by: PHYSICAL THERAPIST

## 2025-07-02 PROCEDURE — 97530 THERAPEUTIC ACTIVITIES: CPT | Performed by: PHYSICAL THERAPIST

## 2025-07-02 NOTE — PROGRESS NOTES
"   Physical Therapy Initial Evaluation and Plan of Care    Patient: Murray Regalado   : 1995  Diagnosis/ICD-10 Code:  Acute pain of right knee [M25.561]  Referring practitioner: Carrillo Irizarry MD  Date of Initial Visit: 2025  Today's Date: 2025  Patient seen for 1 session         Visit Diagnoses:    ICD-10-CM ICD-9-CM   1. Acute pain of right knee  M25.561 719.46   2. Other meniscus derangements, unspecified medial meniscus, right knee  M23.303 717.3   3. Difficulty performing work activities  Z56.89 V62.29         Subjective Questionnaire: LEFS: 40/80      Subjective Evaluation    History of Present Illness  Date of onset: 2025  Mechanism of injury: Pt is a 31 y/o WM who reports to the clinic with right knee pain after injuring it on 2025 when he was pushing a pt out of a house.  He  turned to the left and his knee bent underneath him and he fell onto it.  It popped.  Pt states it now sounds like \"Rice Krispies\" when he straightens and bends his knee.  Pt states it hurts on the medal and lateral knee joint.  No prior surgery or issues with his right knee.  Pt had X-ray that evening negative for Fx.  Pt went to work comp MD Monday-referral PT-started on mobic and a muscle relaxer-it helps with sleeping.  It catches with bending and with turning it feels like it is going to give out.  Pt F/U with MD on .        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, swimming Quality of life: good    Pain  Current pain ratin  At worst pain ratin  Location: right medial and lateral knee  Quality: dull ache and throbbing  Relieving factors: medications, rest, support and change in position (sitting with pillow underneath the knee, Mobic and a muscle relaxer)  Aggravating factors: standing, ambulation, stairs, squatting, sleeping and prolonged positioning (wakes up at night due to being in one position too long)    Hand dominance: " right    Diagnostic Tests  X-ray: normal    Treatments  No previous or current treatments  Patient Goals  Patient goals for therapy: decreased pain, increased motion, increased strength, return to work and return to sport/leisure activities       Patient Active Problem List    Diagnosis Date Noted    Reactive airway disease 04/24/2019    Status post arthroscopy of shoulder 11/22/2017    Routine adult health maintenance 05/19/2017    Class 2 severe obesity due to excess calories with serious comorbidity and body mass index (BMI) of 38.0 to 38.9 in adult 05/19/2017    Essential hypertension     Impingement syndrome of right shoulder 03/04/2015      Past Medical History:   Diagnosis Date    Class 2 severe obesity due to excess calories with serious comorbidity and body mass index (BMI) of 38.0 to 38.9 in adult     GERD (gastroesophageal reflux disease) 2018    H/O Bell's palsy     2015, RESOLVED WITH STEROIDS    Hypertension     Impingement syndrome of right shoulder     Lactose intolerance     Reactive airway disease 04/24/2019    Right shoulder pain     SCHEDULED FOR SURGERY    Shoulder injury     right    Tear of right glenoid labrum 10/24/2017      Past Surgical History:   Procedure Laterality Date    SHOULDER ARTHROSCOPY Right 11/15/2017    Procedure: SHOULDER ARTHROSCOPY, labral repair, and all associated procedures;  Surgeon: Negro Martinez MD;  Location: [facility];  Service:     TONSILLECTOMY      TOOTH EXTRACTION  05/2017        Objective          Tenderness     Right Knee   Tenderness in the medial joint line and medial patella.     Additional Tenderness Details  Pt with minimal tenderness right medial knee joint line     Active Range of Motion   Left Knee   Flexion: 134 degrees     Right Knee   Flexion: 135 degrees with pain    Additional Active Range of Motion Details  Right knee 7 degrees HE , left knee 4 degrees     Patellar Mobility   Left Knee Patellar tendons within functional limits include the  medial, lateral, superior and inferior.     Right Knee Patellar tendons within functional limits include the medial, lateral, superior and inferior.     Strength/Myotome Testing     Left Hip   Planes of Motion   Flexion: 5  Abduction: 5  Adduction: 5    Right Hip   Planes of Motion   Flexion: 5  Extension: 3  Abduction: 5  Adduction: 5    Left Knee   Flexion: 5  Extension: 5  Quadriceps contraction: good    Right Knee   Flexion: 3+  Extension: 4  Quadriceps contraction: good    Additional Strength Details  Supine right hip flex 4/5, left 5/5       Tests     Left Hip   90/90 SLR: Negative.   SLR: Negative.     Right Hip   90/90 SLR: Positive.   SLR: Positive.     Left Knee   Negative anterior drawer, lateral Raman, medial Raman, posterior drawer, valgus stress test at 0 degrees, valgus stress test at 30 degrees, varus stress test at 0 degrees and varus stress test at 30 degrees.     Right Knee   Positive lateral Raman and medial Raman.   Negative anterior drawer, posterior drawer, valgus stress test at 0 degrees, valgus stress test at 30 degrees, varus stress test at 0 degrees and varus stress test at 30 degrees.     Additional Tests Details  Minimal tightness right hamstring   No popping or clicking with Raman, but painful with testing     Ambulation     Observational Gait   Gait: antalgic   Walking speed, stride length and right swing time within functional limits. Decreased right stance time and right step length.   Right foot contact pattern: heel to toe    Additional Observational Gait Details  Pt is ambulating WBAT right LE with a minimal antalgic gait wearing a knee sleeve.            Assessment & Plan       Assessment  Impairments: abnormal gait, activity intolerance, impaired physical strength, lacks appropriate home exercise program, pain with function and weight-bearing intolerance   Functional limitations: sleeping, walking, pushing, uncomfortable because of pain, sitting, standing,  stooping and unable to perform repetitive tasks   Assessment details: Pt is a 31 y/o WM who reports to the clinic with right knee pain, decreased flexibility, decreased strength, tenderness, pain with Raman test and decreased functional mobility with ambulation, standing, and steps. Pt has personal factors of being a Paramedic, which may affect progress in plan of care. Signs and symptoms are consistent with physical therapy diagnosis of right knee pain with possible meniscus tear. Patient is appropriate for skilled PT to address impairments and reduce pain in order to improve ease with daily mobility and ADLs.  Pt is educated on anatomy of the knee, possible causes of pain, course of treatment, use of ice and pt was given a HEP.        Prognosis: good    Goals  Plan Goals: STGs: 2-4 weeks  1. Decrease right knee pain to a < or = 4/10 with standing, sitting, and ambulation.    2. Decrease right medial knee tenderness to minimal to none for improved ability to sleep through the night.      3.  Increase right hamstring flexibility to WNL with 90-90 test to increase ability to dress his right LE.    4.  Pt ambulates into clinic without an antalgic gait right LE for improved balance and stability during gait.           LTGs: 5-8 weeks  1.  Pt Independent with HEP for self management of symptoms.  2.  Decrease right knee pain t < or = 1/10 with standing, ambulation, sitting, and steps.    3.  Increase right supine hip flexion and ext to 5/5 for improved ability to stand, ambulate and climb steps.   4.  Increase right knee flexion strength to 5/5 for improved ability to ascend/descend steps and perform his work duties.          Plan  Therapy options: will be seen for skilled therapy services  Planned modality interventions: cryotherapy, electrical stimulation/Russian stimulation, thermotherapy (hydrocollator packs), ultrasound, iontophoresis and dry needling  Planned therapy interventions: joint mobilization, home  exercise program, gait training, flexibility, strengthening, stretching, functional ROM exercises, manual therapy, neuromuscular re-education, soft tissue mobilization and therapeutic activities  Frequency: 3x week  Duration in weeks: 12  Treatment plan discussed with: patient        Timed:         Manual Therapy:         mins  99506;     Therapeutic Exercise:   22      mins  00500;     Neuromuscular Logan:        mins  33799;    Therapeutic Activity:    8      mins  42578;     Gait Training:           mins  15119;     Ultrasound:          mins  85518;    Ionto                                   mins   02510  Self Care                            mins   87742  Canalith Repos         mins 65104      Un-Timed:  Electrical Stimulation:    15     mins  79247 ( );  Dry Needling          mins self-pay  Traction          mins 64991  Low Eval    20      Mins  08914  Mod Eval          Mins  79281  High Eval                            Mins  62148        Timed Treatment:  30    mins   Total Treatment:     65   mins          PT: Adele Carpio PT     License Number: [id number]  Electronically signed by Adele Carpio PT, 07/02/25, 3:42 PM EDT    Certification Period: 7/2/2025 thru 9/29/2025  I certify that the therapy services are furnished while this patient is under my care.  The services outlined above are required by this patient, and will be reviewed every 90 days.         Physician Signature:__________________________________________________    PHYSICIAN: Carrillo Irizarry MD  NPI: [id number]                                      DATE:      Please sign and return via fax to [email or web address] . Thank you, [facility].

## 2025-07-03 ENCOUNTER — TREATMENT (OUTPATIENT)
Dept: PHYSICAL THERAPY | Facility: CLINIC | Age: 30
End: 2025-07-03
Payer: OTHER MISCELLANEOUS

## 2025-07-03 DIAGNOSIS — M25.561 ACUTE PAIN OF RIGHT KNEE: Primary | ICD-10-CM

## 2025-07-03 DIAGNOSIS — Z56.89 DIFFICULTY PERFORMING WORK ACTIVITIES: ICD-10-CM

## 2025-07-03 DIAGNOSIS — M23.303 OTHER MENISCUS DERANGEMENTS, UNSPECIFIED MEDIAL MENISCUS, RIGHT KNEE: ICD-10-CM

## 2025-07-03 PROCEDURE — 97530 THERAPEUTIC ACTIVITIES: CPT | Performed by: PHYSICAL THERAPIST

## 2025-07-03 PROCEDURE — 97110 THERAPEUTIC EXERCISES: CPT | Performed by: PHYSICAL THERAPIST

## 2025-07-03 PROCEDURE — 97014 ELECTRIC STIMULATION THERAPY: CPT | Performed by: PHYSICAL THERAPIST

## 2025-07-03 NOTE — PROGRESS NOTES
Physical Therapy Daily Treatment Note    Patient: Murray Regalado   : 1995  Referring practitioner: Carrillo Irizarry MD  Date of Initial Visit: Type: THERAPY  Noted: 2025  Today's Date: 7/3/2025  Patient seen for 2 sessions       Visit Diagnoses:    ICD-10-CM ICD-9-CM   1. Acute pain of right knee  M25.561 719.46   2. Other meniscus derangements, unspecified medial meniscus, right knee  M23.303 717.3   3. Difficulty performing work activities  Z56.89 V62.29       Murrya Regalado reports: his knee is not bad, but it still hurts a lot at night.      Subjective       Objective   See Exercise, Manual, and Modality Logs for complete treatment.       Assessment & Plan       Assessment  Assessment details: Pt is tolerating treatment fairly well.  Pt still has moderate right knee pain, but is able to perform all gentle ROM and strengthening exercises.  Added TruBike for ROM, heelslides, SAQ and TKE for improved quad strength.  Pt completed all new exercises with minimal discomfort.  Ended treatment with IFC and cold pack to decrease pain and tenderness.  Will continue to see pt 3x/week for stretching, strengthening and modalities PRN for pain.            Progress per Plan of Care      Timed:         Manual Therapy:         mins  59513;     Therapeutic Exercise:   22      mins  07101;     Neuromuscular Logan:        mins  34266;    Therapeutic Activity:     12     mins  98486;     Gait Training:           mins  26138;     Ultrasound:          mins  96353;    Ionto                                   mins   49449  Self Care                            mins   73007      Un-Timed:  Electrical Stimulation:    15     mins  27890 ( );  Dry Needling          mins self-pay  Traction          mins 39956  Canalith Repos         mins 50459      Timed Treatment:  34    mins   Total Treatment:     54   mins    Adele Carpio, PT  [place] License: [id number]

## 2025-07-07 ENCOUNTER — TREATMENT (OUTPATIENT)
Dept: PHYSICAL THERAPY | Facility: CLINIC | Age: 30
End: 2025-07-07
Payer: OTHER MISCELLANEOUS

## 2025-07-07 DIAGNOSIS — Z56.89 DIFFICULTY PERFORMING WORK ACTIVITIES: ICD-10-CM

## 2025-07-07 DIAGNOSIS — M23.303 OTHER MENISCUS DERANGEMENTS, UNSPECIFIED MEDIAL MENISCUS, RIGHT KNEE: ICD-10-CM

## 2025-07-07 DIAGNOSIS — M25.561 ACUTE PAIN OF RIGHT KNEE: Primary | ICD-10-CM

## 2025-07-07 PROCEDURE — 97530 THERAPEUTIC ACTIVITIES: CPT | Performed by: PHYSICAL THERAPIST

## 2025-07-07 PROCEDURE — 97110 THERAPEUTIC EXERCISES: CPT | Performed by: PHYSICAL THERAPIST

## 2025-07-07 PROCEDURE — 97014 ELECTRIC STIMULATION THERAPY: CPT | Performed by: PHYSICAL THERAPIST

## 2025-07-07 NOTE — PROGRESS NOTES
Physical Therapy Daily Treatment Note  [facility]  [address], [place] [id number] [phone] (phone)  [phone] (fax)    Patient: Murray Regalado   : 1995  Diagnosis/ICD-10 Code:  Acute pain of right knee [M25.561]  Referring practitioner: Carrillo Irizarry MD  Date of Initial Visit: Type: THERAPY  Noted: 2025  Today's Date: 2025  Patient seen for 3 sessions       Murray Regalado reports: saw MD this morning. Said 1 more week of PT and then get an MRI. Still thinking it could be a meniscal tear. R knee is not getting any worse, but also not getting any better.     Subjective     Objective   See Exercise, Manual, and Modality Logs for complete treatment.       Assessment/Plan  Performed well with continued R knee mobility and strengthening interventions, added s/l hip abduction and bridge for increased hip strengthening for increased knee stability. Mild pain reported in medial knee with exercise.  Continues to benefit from verbal/tactile cues to ensure proper form and technique for exercise performance. Continued IFC and ice for pain control.     Progress per Plan of Care           Manual Therapy:         mins  45013;  Therapeutic Exercise:    23     mins  84475;     Neuromuscular Logan:        mins  29218;    Therapeutic Activity:     8     mins  90538;     Gait Training:           mins  29469;     Ultrasound:          mins  52306;    Electrical Stimulation:    15     mins  57309;  Traction          mins 08839    Timed Treatment:   31   mins   Total Treatment:     46   mins    Vandana Duffy PTA  Physical Therapist Assistant [id number]

## 2025-07-08 ENCOUNTER — OFFICE VISIT (OUTPATIENT)
Dept: FAMILY MEDICINE CLINIC | Facility: CLINIC | Age: 30
End: 2025-07-08
Payer: COMMERCIAL

## 2025-07-08 VITALS
BODY MASS INDEX: 41.85 KG/M2 | WEIGHT: 309 LBS | HEART RATE: 90 BPM | SYSTOLIC BLOOD PRESSURE: 130 MMHG | HEIGHT: 72 IN | DIASTOLIC BLOOD PRESSURE: 82 MMHG | TEMPERATURE: 98.4 F | OXYGEN SATURATION: 97 %

## 2025-07-08 DIAGNOSIS — Z00.00 ROUTINE ADULT HEALTH MAINTENANCE: Primary | ICD-10-CM

## 2025-07-08 PROBLEM — Z98.890 STATUS POST ARTHROSCOPY OF SHOULDER: Status: RESOLVED | Noted: 2017-11-22 | Resolved: 2025-07-08

## 2025-07-08 PROCEDURE — 99395 PREV VISIT EST AGE 18-39: CPT | Performed by: FAMILY MEDICINE

## 2025-07-08 NOTE — PROGRESS NOTES
"  Chief Complaint   Patient presents with    Annual Exam       Subjective   Murray Regalado is a 30 y.o. male and is here for a yearly physical exam. The patient reports no problems.    Do you take any herbs or supplements that were not prescribed by a doctor? yes. If so, these will be added to active medication list.    The following portions of the patient's history were reviewed and updated as appropriate: allergies, current medications, past family history, past medical history, past social history, past surgical history, and problem list.    Social and Family and Surgical History reviewed and updated today.    Health and Surgical History, Preventive Measures and Vaccination flow sheets reviewed and updated today.    Patient's current medical chart in Epic; including previous office notes, Mychart messages, recent phone calls, imaging, labs, specialist's evaluation either in notes or in Media tab reviewed today.    Other outside pertinent medical information also reviewed thru Care Everywhere function is also reviewed today.    Review of Systems  Review of Systems   Constitutional:  Negative for chills, diaphoresis, fatigue and fever.   HENT:  Negative for congestion and sore throat.    Respiratory:  Negative for cough.    Cardiovascular:  Negative for chest pain.   Gastrointestinal:  Negative for abdominal pain, nausea and vomiting.   Genitourinary:  Negative for dysuria.   Musculoskeletal:  Negative for myalgias and neck pain.   Skin:  Negative for rash.   Neurological:  Negative for weakness, numbness and headaches.     Pertinent items are noted in HPI.    Vitals:    07/08/25 0923   BP: 130/82   BP Location: Left arm   Patient Position: Sitting   Cuff Size: Adult   Pulse: 90   Temp: 98.4 °F (36.9 °C)   SpO2: 97%   Weight: (!) 140 kg (309 lb)   Height: 182.9 cm (72\")     Body mass index is 41.91 kg/m².          General Appearance:  Alert, cooperative, no distress, appears stated age   Head:  Normocephalic, " without obvious abnormality, atraumatic   Eyes:  PERRL, conjunctiva/corneas clear, EOM's intact.   Ears:  Normal TM's and external ear canals, both ears   Nose: Nares normal, septum midline, mucosa normal, no drainage or sinus tenderness   Throat: Lips, mucosa, and tongue normal; teeth and gums viewed   Neck: Supple, symmetrical,  no adenopathy;   thyroid: No enlargement/tenderness/nodules;   no carotid bruit   Back:  Symmetric, no curvature, ROM normal, no CVA tenderness   Lungs:  Clear to auscultation bilaterally, respirations unlabored   Chest wall:  No tenderness or deformity   Heart:  Regular rate and rhythm, S1 and S2 normal, no murmur.   Abdomen:  Soft, non-tender, bowel sounds active all four quadrants,   no masses, no organomegaly   Rectal:        Extremities: Extremities normal, atraumatic, no cyanosis or edema   Pulses: 2+ and symmetric all extremities   Skin: Skin color, texture, turgor normal, no rashes. No suspicious lesions   Lymph nodes: Cervical, supraclavicular, and axillary nodes normal   Neurologic: CNII-XII intact. Normal strength, sensation.          Results for orders placed or performed in visit on 07/01/25   Testosterone, Free, Total    Collection Time: 07/02/25 11:34 AM    Specimen: Blood   Result Value Ref Range    Testosterone, Total 483 264 - 916 ng/dL    Testosterone, Free 11.3 8.7 - 25.1 pg/mL   Lipid Panel    Collection Time: 07/02/25 11:34 AM    Specimen: Blood   Result Value Ref Range    Total Cholesterol 207 (H) 100 - 199 mg/dL    Triglycerides 304 (H) 0 - 149 mg/dL    HDL Cholesterol 34 (L) >39 mg/dL    VLDL Cholesterol Simon 53 (H) 5 - 40 mg/dL    LDL Chol Calc (NIH) 120 (H) 0 - 99 mg/dL   Comprehensive Metabolic Panel    Collection Time: 07/02/25 11:34 AM    Specimen: Blood   Result Value Ref Range    Glucose 88 70 - 99 mg/dL    BUN 16 6 - 20 mg/dL    Creatinine 0.93 0.76 - 1.27 mg/dL    EGFR Result 112 >59 mL/min/1.73    BUN/Creatinine Ratio 17 9 - 20    Sodium 138 134 - 144  mmol/L    Potassium 4.2 3.5 - 5.2 mmol/L    Chloride 100 96 - 106 mmol/L    Total CO2 20 20 - 29 mmol/L    Calcium 9.2 8.7 - 10.2 mg/dL    Total Protein 7.1 6.0 - 8.5 g/dL    Albumin 4.5 4.1 - 5.1 g/dL    Globulin 2.6 1.5 - 4.5 g/dL    Total Bilirubin 0.5 0.0 - 1.2 mg/dL    Alkaline Phosphatase 100 44 - 121 IU/L    AST (SGOT) 27 0 - 40 IU/L    ALT (SGPT) 43 0 - 44 IU/L   CBC (No Diff)    Collection Time: 07/02/25 11:34 AM    Specimen: Blood   Result Value Ref Range    WBC 8.8 3.4 - 10.8 x10E3/uL    RBC 4.71 4.14 - 5.80 x10E6/uL    Hemoglobin 14.6 13.0 - 17.7 g/dL    Hematocrit 43.9 37.5 - 51.0 %    MCV 93 79 - 97 fL    MCH 31.0 26.6 - 33.0 pg    MCHC 33.3 31.5 - 35.7 g/dL    RDW 13.1 11.6 - 15.4 %    Platelets 218 150 - 450 x10E3/uL   TSH Rfx On Abnormal To Free T4    Collection Time: 07/02/25 11:34 AM    Specimen: Blood   Result Value Ref Range    TSH 2.590 0.450 - 4.500 uIU/mL     Assessment & Plan   Healthy male exam.  Diagnoses and all orders for this visit:    1. Routine adult health maintenance (Primary)      1. Currently with [employer] for workers comp right knee injury    2. Patient Counseling:  --Nutrition: Stressed importance of moderation in: sodium, caffeine, , saturated fat and cholesterol.  Discussed: caloric balance, sufficient intake of fresh fruits, vegetables, fiber, calcium, iron.  --Exercise: Stressed the importance of regular exercise.   --Substance Abuse: Discussed cessation and or reduction of tobacco, alcohol, or other drug use; driving or other dangerous activities under the influence.    --Dental health: Discussed importance of regular tooth brushing, flossing, and dental visits.  --Suggested having eyes and vision checked if needed or past due.  --Immunizations reviewed and given if needed.    3. Discussed the patient's BMI with him.  The BMI is above average; BMI management plan is completed  4. Follow up in one year    There are no Patient Instructions on file for this  visit.    Medications Discontinued During This Encounter   Medication Reason    ibuprofen (IBU) 600 MG tablet *Therapy completed        Dr. Son Hwang MD  [place], [place]  [facility]

## 2025-07-09 ENCOUNTER — TREATMENT (OUTPATIENT)
Dept: PHYSICAL THERAPY | Facility: CLINIC | Age: 30
End: 2025-07-09
Payer: OTHER MISCELLANEOUS

## 2025-07-09 DIAGNOSIS — Z56.89 DIFFICULTY PERFORMING WORK ACTIVITIES: ICD-10-CM

## 2025-07-09 DIAGNOSIS — M25.561 ACUTE PAIN OF RIGHT KNEE: Primary | ICD-10-CM

## 2025-07-09 DIAGNOSIS — M23.303 OTHER MENISCUS DERANGEMENTS, UNSPECIFIED MEDIAL MENISCUS, RIGHT KNEE: ICD-10-CM

## 2025-07-09 NOTE — PROGRESS NOTES
Physical Therapy Daily Treatment Note  [facility]  [address], [place] [id number] [phone] (phone)  [phone] (fax)    Patient: Murray Regalado   : 1995  Diagnosis/ICD-10 Code:  Acute pain of right knee [M25.561]  Referring practitioner: No ref. provider found  Date of Initial Visit: Type: THERAPY  Noted: 2025  Today's Date: 2025  Patient seen for 4 sessions       Murray Regalado reports: pain in R knee is relatively unchanged. Almost always stays at a 4/10 nagging pain. More sore this morning due to having to do stairs at work as elevator is out of service. Also forgot to take muscle relaxer before bed so more spasms noted during the night.     Subjective     Objective   See Exercise, Manual, and Modality Logs for complete treatment.       Assessment/Plan  Subjectively, pt reports no increase of pain or discomfort with interventions performed today. Performed well with continued R knee mobility and strengthening interventions. Continued to defer painful exercise but still able to increase repetitions of most exercises. Continues to benefit from verbal/tactile cues to ensure proper form and technique for exercise performance.     Progress per Plan of Care           Manual Therapy:         mins  57466;  Therapeutic Exercise:    10     mins  72854;     Neuromuscular Logan:    8    mins  89986;    Therapeutic Activity:     12     mins  77504;     Gait Training:           mins  87967;     Ultrasound:          mins  91008;    Electrical Stimulation:    15     mins  81994;  Traction          mins 93500    Timed Treatment:   30   mins   Total Treatment:     45   mins    Vandana Duffy PTA  Physical Therapist Assistant [phone]

## 2025-07-11 ENCOUNTER — TREATMENT (OUTPATIENT)
Dept: PHYSICAL THERAPY | Facility: CLINIC | Age: 30
End: 2025-07-11
Payer: OTHER MISCELLANEOUS

## 2025-07-11 DIAGNOSIS — M23.303 OTHER MENISCUS DERANGEMENTS, UNSPECIFIED MEDIAL MENISCUS, RIGHT KNEE: ICD-10-CM

## 2025-07-11 DIAGNOSIS — Z56.89 DIFFICULTY PERFORMING WORK ACTIVITIES: ICD-10-CM

## 2025-07-11 DIAGNOSIS — M25.561 ACUTE PAIN OF RIGHT KNEE: Primary | ICD-10-CM

## 2025-07-11 NOTE — PROGRESS NOTES
Physical Therapy Daily Treatment Note    Patient: Murray Regalado   : 1995  Referring practitioner: Carrillo Irizarry MD  Date of Initial Visit: Type: THERAPY  Noted: 2025  Today's Date: 2025  Patient seen for 5 sessions       Visit Diagnoses:    ICD-10-CM ICD-9-CM   1. Acute pain of right knee  M25.561 719.46   2. Other meniscus derangements, unspecified medial meniscus, right knee  M23.303 717.3   3. Difficulty performing work activities  Z56.89 V62.29       Murray Regalado reports: his knee has been more painful today.  Pt states he woke up that way.  Pt describes his pain as sharp that shoots across the patella.      Subjective       Objective   See Exercise, Manual, and Modality Logs for complete treatment.       Assessment & Plan       Assessment  Assessment details: Pt is tolerating treatment fairly well, but still has moderate right knee pain with daily activity.  Pt with palpable PF crepitus during TKE, so added ball squeeze in limited ROM during LAQ.  Question if pt could be having some PF pain due to decreased patellar tracking.  Pt able to complete all other exercises with some discomfort.  Ended treatment with IFC and cold pack to decrease pain and tenderness.  Will continue to see pt 3x/week for stretching, strengthening and modalities PRN for pain.          Progress per Plan of Care      Timed:         Manual Therapy:         mins  97327;     Therapeutic Exercise:   20      mins  53580;     Neuromuscular Logan:        mins  05576;    Therapeutic Activity:    13      mins  08730;     Gait Training:           mins  16614;     Ultrasound:          mins  83505;    Ionto                                   mins   49669  Self Care                            mins   85513      Un-Timed:  Electrical Stimulation:   15      mins  10378 ( );  Dry Needling          mins self-pay  Traction          mins 23796  Canalith Repos         mins 94531      Timed Treatment:  33    mins   Total  Treatment:     53   mins    Adele Carpio, PT  [place] License: [id number]

## 2025-07-21 ENCOUNTER — TREATMENT (OUTPATIENT)
Dept: PHYSICAL THERAPY | Facility: CLINIC | Age: 30
End: 2025-07-21
Payer: OTHER MISCELLANEOUS

## 2025-07-21 DIAGNOSIS — Z56.89 DIFFICULTY PERFORMING WORK ACTIVITIES: ICD-10-CM

## 2025-07-21 DIAGNOSIS — M23.303 OTHER MENISCUS DERANGEMENTS, UNSPECIFIED MEDIAL MENISCUS, RIGHT KNEE: ICD-10-CM

## 2025-07-21 DIAGNOSIS — M25.561 ACUTE PAIN OF RIGHT KNEE: Primary | ICD-10-CM

## 2025-07-21 PROCEDURE — 97110 THERAPEUTIC EXERCISES: CPT | Performed by: PHYSICAL THERAPIST

## 2025-07-21 PROCEDURE — 97014 ELECTRIC STIMULATION THERAPY: CPT | Performed by: PHYSICAL THERAPIST

## 2025-07-21 PROCEDURE — 97530 THERAPEUTIC ACTIVITIES: CPT | Performed by: PHYSICAL THERAPIST

## 2025-07-21 NOTE — PROGRESS NOTES
Physical Therapy Daily Treatment Note    Patient: Murray Regalado   : 1995  Referring practitioner: Carrillo Irizarry MD  Date of Initial Visit: Type: THERAPY  Noted: 2025  Today's Date: 2025  Patient seen for 6 sessions       Visit Diagnoses:    ICD-10-CM ICD-9-CM   1. Acute pain of right knee  M25.561 719.46   2. Difficulty performing work activities  Z56.89 V62.29   3. Other meniscus derangements, unspecified medial meniscus, right knee  M23.303 717.3       Murray Regalado reports: his knee is doing okay, but it did not like walking in the sand.  It hurts the most with climbing stairs.       Subjective       Objective   See Exercise, Manual, and Modality Logs for complete treatment.       Assessment & Plan       Assessment  Assessment details: Pt is tolerating treatment fairly well, but still has pain with CKC strengthening exercises especially with steps due to having an audible pop and PF crepitus during steps.  Pt completed all [facility] strengthening and stretching exercises without difficulty.  Added sitting hamstring curl vs TB and sidelying hip add to improve quad control to decrease PF pain during TKE.  Ended treatment with IFC and cold pack to decrease pain and swelling.  Pt sees MD and will continue as MD advises.            Progress per Plan of Care      Timed:         Manual Therapy:         mins  73629;     Therapeutic Exercise:   24      mins  09544;     Neuromuscular Logan:        mins  91086;    Therapeutic Activity:    15      mins  29709;     Gait Training:           mins  41225;     Ultrasound:          mins  51942;    Ionto                                   mins   54196  Self Care                            mins   06443      Un-Timed:  Electrical Stimulation:   15      mins  62633 ( );  Dry Needling          mins self-pay  Traction          mins 62988  Canalith Repos         mins 82840      Timed Treatment:  39    mins   Total Treatment:     59   mins    Adele Holman  Shirin, PT  [place] License: [id number]

## 2025-08-09 DIAGNOSIS — I10 ESSENTIAL HYPERTENSION: ICD-10-CM

## 2025-08-09 RX ORDER — LISINOPRIL 20 MG/1
20 TABLET ORAL DAILY
Qty: 90 TABLET | Refills: 3 | Status: SHIPPED | OUTPATIENT
Start: 2025-08-09

## 2025-08-25 ENCOUNTER — OFFICE VISIT (OUTPATIENT)
Dept: ORTHOPEDIC SURGERY | Facility: CLINIC | Age: 30
End: 2025-08-25
Payer: OTHER MISCELLANEOUS

## 2025-08-25 VITALS
BODY MASS INDEX: 41.8 KG/M2 | HEART RATE: 75 BPM | SYSTOLIC BLOOD PRESSURE: 125 MMHG | WEIGHT: 308.6 LBS | HEIGHT: 72 IN | DIASTOLIC BLOOD PRESSURE: 82 MMHG

## 2025-08-25 DIAGNOSIS — M23.051: ICD-10-CM

## 2025-08-25 DIAGNOSIS — M22.41 PATELLA, CHONDROMALACIA, RIGHT: Primary | ICD-10-CM

## 2025-08-25 PROCEDURE — 99203 OFFICE O/P NEW LOW 30 MIN: CPT | Performed by: ORTHOPAEDIC SURGERY

## 2025-08-26 ENCOUNTER — PATIENT ROUNDING (BHMG ONLY) (OUTPATIENT)
Dept: ORTHOPEDIC SURGERY | Facility: CLINIC | Age: 30
End: 2025-08-26
Payer: COMMERCIAL

## (undated) DEVICE — 4.5 MM CONCAVE, FULL RADIUS,                                    CURVE, CURVED BLADES, POWER/EP-1,                                    YELLOW, CURVE LENGHTS 17 MM,                                    PACKAGED 6 PER BOX, STERILE

## (undated) DEVICE — ACCU-PASS SUTURE SHUTTLE 45                                    DEGREE, LEFT, STERILE: Brand: ACCU-PASS

## (undated) DEVICE — SPNG GZ WOVN 4X4IN 12PLY 10/BX STRL

## (undated) DEVICE — GLV SURG NEOLON 2G PF LF 7.5 STRL

## (undated) DEVICE — SYR LUERLOK 20CC

## (undated) DEVICE — SUT ETHLN 3/0 PS2 18 IN 1669H

## (undated) DEVICE — DRP Z/FRICTION 10X16IN

## (undated) DEVICE — ST TB GOFLO STRL

## (undated) DEVICE — GLV SURG SENSICARE W/ALOE PF LF 7.5 STRL

## (undated) DEVICE — SYR LUERLOK 50ML

## (undated) DEVICE — 3M™ IOBAN™ 2 ANTIMICROBIAL INCISE DRAPE 6640EZ: Brand: IOBAN™ 2

## (undated) DEVICE — DECANT BG O JET

## (undated) DEVICE — 4.5 FULL RADIUS BONECUTTER BLADES,                                    YELLOW, 8000 MAXIMUM RPM, PACKAGED 6                                    PER BOX, STERILE

## (undated) DEVICE — CANNULA THREADED FLEX 5.5 X 72MM: Brand: CLEAR-TRAC

## (undated) DEVICE — 1016 S-DRAPE IRRIG POUCH 10/BOX: Brand: STERI-DRAPE™

## (undated) DEVICE — CANNULA THREADED FLEX 8.0 X 72MM: Brand: CLEAR-TRAC

## (undated) DEVICE — SUPER TURBOVAC 90 IFS: Brand: COBLATION

## (undated) DEVICE — SHEET, ORTHO, SPLIT, STERILE: Brand: MEDLINE

## (undated) DEVICE — TOWEL,OR,DSP,ST,BLUE,STD,4/PK,20PK/CS: Brand: MEDLINE

## (undated) DEVICE — 1010 S-DRAPE TOWEL DRAPE 10/BX: Brand: STERI-DRAPE™

## (undated) DEVICE — CONN TBG Y 5 IN 1 LF STRL

## (undated) DEVICE — DECANTER: Brand: UNBRANDED

## (undated) DEVICE — PETROLATUM DRESSING. FINE MESH GAUZE IMPREGNATED WITH 3% BISMUTH TRIBROMOPHENATE  IN A PETROLATUM BLEND.: Brand: XEROFORM PETROLATUM

## (undated) DEVICE — 3M™ STERI-DRAPE™ U-DRAPE 1015: Brand: STERI-DRAPE™

## (undated) DEVICE — APPL CHLORAPREP W/TINT 26ML ORNG

## (undated) DEVICE — DRSNG SURESITE WNDW 4X4.5

## (undated) DEVICE — ACCU-PASS SUTURE SHUTTLE 45                                    DEGREE, RIGHT, STERILE: Brand: ACCU-PASS

## (undated) DEVICE — SOL ISO/ALC RUB 70PCT 4OZ

## (undated) DEVICE — GLV SURG SENSICARE ORTHO PF LF 7 STRL

## (undated) DEVICE — 3M™ COBAN™ STERILE SELF-ADHERENT WRAP, 1584S, 4 IN X 5 YD (10 CM X 4,5 M), 18 ROLLS/CASE: Brand: 3M™ COBAN™

## (undated) DEVICE — PK SHLDR ARTHSCP 90

## (undated) DEVICE — CLEAR-TRAC DISPOSABLE STOPCOCK: Brand: CLEAR-TRAC

## (undated) DEVICE — NDL HYPO SFTY GLD 22G 1 1/2IN